# Patient Record
Sex: MALE | Race: WHITE | NOT HISPANIC OR LATINO
[De-identification: names, ages, dates, MRNs, and addresses within clinical notes are randomized per-mention and may not be internally consistent; named-entity substitution may affect disease eponyms.]

---

## 2017-01-13 ENCOUNTER — CHART COPY (OUTPATIENT)
Age: 72
End: 2017-01-13

## 2017-04-17 ENCOUNTER — APPOINTMENT (OUTPATIENT)
Dept: NEUROLOGY | Facility: CLINIC | Age: 72
End: 2017-04-17

## 2017-05-22 ENCOUNTER — APPOINTMENT (OUTPATIENT)
Dept: CT IMAGING | Facility: CLINIC | Age: 72
End: 2017-05-22

## 2017-05-24 ENCOUNTER — APPOINTMENT (OUTPATIENT)
Dept: RADIOLOGY | Facility: CLINIC | Age: 72
End: 2017-05-24

## 2017-05-24 ENCOUNTER — APPOINTMENT (OUTPATIENT)
Dept: CT IMAGING | Facility: CLINIC | Age: 72
End: 2017-05-24

## 2017-05-24 ENCOUNTER — OUTPATIENT (OUTPATIENT)
Dept: OUTPATIENT SERVICES | Facility: HOSPITAL | Age: 72
LOS: 1 days | End: 2017-05-24
Payer: MEDICARE

## 2017-05-24 DIAGNOSIS — Z00.8 ENCOUNTER FOR OTHER GENERAL EXAMINATION: ICD-10-CM

## 2017-05-24 PROCEDURE — 77002 NEEDLE LOCALIZATION BY XRAY: CPT

## 2017-05-24 PROCEDURE — 73201 CT UPPER EXTREMITY W/DYE: CPT

## 2017-05-24 PROCEDURE — 23350 INJECTION FOR SHOULDER X-RAY: CPT

## 2017-06-13 ENCOUNTER — OUTPATIENT (OUTPATIENT)
Dept: OUTPATIENT SERVICES | Facility: HOSPITAL | Age: 72
LOS: 1 days | End: 2017-06-13
Payer: MEDICARE

## 2017-06-13 VITALS
OXYGEN SATURATION: 100 % | TEMPERATURE: 98 F | WEIGHT: 184.97 LBS | SYSTOLIC BLOOD PRESSURE: 120 MMHG | DIASTOLIC BLOOD PRESSURE: 64 MMHG | HEART RATE: 70 BPM | RESPIRATION RATE: 16 BRPM | HEIGHT: 73 IN

## 2017-06-13 DIAGNOSIS — Z98.1 ARTHRODESIS STATUS: Chronic | ICD-10-CM

## 2017-06-13 DIAGNOSIS — Z95.1 PRESENCE OF AORTOCORONARY BYPASS GRAFT: Chronic | ICD-10-CM

## 2017-06-13 DIAGNOSIS — Z98.890 OTHER SPECIFIED POSTPROCEDURAL STATES: Chronic | ICD-10-CM

## 2017-06-13 DIAGNOSIS — Z01.818 ENCOUNTER FOR OTHER PREPROCEDURAL EXAMINATION: ICD-10-CM

## 2017-06-13 DIAGNOSIS — M75.121 COMPLETE ROTATOR CUFF TEAR OR RUPTURE OF RIGHT SHOULDER, NOT SPECIFIED AS TRAUMATIC: ICD-10-CM

## 2017-06-13 DIAGNOSIS — Z95.0 PRESENCE OF CARDIAC PACEMAKER: Chronic | ICD-10-CM

## 2017-06-13 DIAGNOSIS — M19.011 PRIMARY OSTEOARTHRITIS, RIGHT SHOULDER: ICD-10-CM

## 2017-06-13 DIAGNOSIS — M25.511 PAIN IN RIGHT SHOULDER: ICD-10-CM

## 2017-06-13 LAB
ANION GAP SERPL CALC-SCNC: 5 MMOL/L — SIGNIFICANT CHANGE UP (ref 5–17)
BUN SERPL-MCNC: 32 MG/DL — HIGH (ref 7–23)
CALCIUM SERPL-MCNC: 9.5 MG/DL — SIGNIFICANT CHANGE UP (ref 8.4–10.5)
CHLORIDE SERPL-SCNC: 104 MMOL/L — SIGNIFICANT CHANGE UP (ref 96–108)
CO2 SERPL-SCNC: 30 MMOL/L — SIGNIFICANT CHANGE UP (ref 22–31)
CREAT SERPL-MCNC: 1.18 MG/DL — SIGNIFICANT CHANGE UP (ref 0.5–1.3)
GLUCOSE SERPL-MCNC: 111 MG/DL — HIGH (ref 70–99)
HCT VFR BLD CALC: 47 % — SIGNIFICANT CHANGE UP (ref 39–50)
HGB BLD-MCNC: 15.4 G/DL — SIGNIFICANT CHANGE UP (ref 13–17)
MCHC RBC-ENTMCNC: 32.8 GM/DL — SIGNIFICANT CHANGE UP (ref 32–36)
MCHC RBC-ENTMCNC: 33.1 PG — SIGNIFICANT CHANGE UP (ref 27–34)
MCV RBC AUTO: 100.9 FL — HIGH (ref 80–100)
PLATELET # BLD AUTO: 155 K/UL — SIGNIFICANT CHANGE UP (ref 150–400)
POTASSIUM SERPL-MCNC: 4.2 MMOL/L — SIGNIFICANT CHANGE UP (ref 3.5–5.3)
POTASSIUM SERPL-SCNC: 4.2 MMOL/L — SIGNIFICANT CHANGE UP (ref 3.5–5.3)
RBC # BLD: 4.66 M/UL — SIGNIFICANT CHANGE UP (ref 4.2–5.8)
RBC # FLD: 12.6 % — SIGNIFICANT CHANGE UP (ref 10.3–14.5)
SODIUM SERPL-SCNC: 139 MMOL/L — SIGNIFICANT CHANGE UP (ref 135–145)
WBC # BLD: 5.6 K/UL — SIGNIFICANT CHANGE UP (ref 3.8–10.5)
WBC # FLD AUTO: 5.6 K/UL — SIGNIFICANT CHANGE UP (ref 3.8–10.5)

## 2017-06-13 PROCEDURE — 85027 COMPLETE CBC AUTOMATED: CPT

## 2017-06-13 PROCEDURE — 36415 COLL VENOUS BLD VENIPUNCTURE: CPT

## 2017-06-13 PROCEDURE — G0463: CPT

## 2017-06-13 PROCEDURE — 93005 ELECTROCARDIOGRAM TRACING: CPT

## 2017-06-13 PROCEDURE — 80048 BASIC METABOLIC PNL TOTAL CA: CPT

## 2017-06-13 PROCEDURE — 93010 ELECTROCARDIOGRAM REPORT: CPT | Mod: NC

## 2017-06-13 NOTE — H&P PST ADULT - NSANTHOSAYNRD_GEN_A_CORE
No. JAGRUTI screening performed.  STOP BANG Legend: 0-2 = LOW Risk; 3-4 = INTERMEDIATE Risk; 5-8 = HIGH Risk

## 2017-06-13 NOTE — H&P PST ADULT - HISTORY OF PRESENT ILLNESS
70 yo male presents with 5 week history right shoulder pain that began after a fall in his home. Pt was seen by surgeon, Ct was done and Rotator cuff tear was diagnosed.  Pain increases with any ROM. 72 yo male presents with 5 week history right shoulder pain that began after a fall in his home. Pt was seen by surgeon, Ct was done and Rotator cuff tear was diagnosed.  Pain increases with any ROM.  Mild relief with Mobic, which pt will stop pre op.

## 2017-06-13 NOTE — H&P PST ADULT - PSH
S/P shoulder surgery Cardiac pacemaker in situ  placed 2011  S/P CABG x 3  2011  S/P cervical spinal fusion  2000  S/P lumbar laminectomy    S/P shoulder surgery  right arthroscopy

## 2017-06-13 NOTE — H&P PST ADULT - MUSCULOSKELETAL
details… detailed exam decreased ROM due to pain/diminished strength/no calf tenderness/diminished ROM. right shoulder pain

## 2017-06-13 NOTE — H&P PST ADULT - ASSESSMENT
Pt presents to PST.  Pre op instructions reviewed and understood.  Medical, cardiac and interrogation notes/clearances to be sent.

## 2017-06-13 NOTE — H&P PST ADULT - PMH
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

## 2017-07-12 ENCOUNTER — OUTPATIENT (OUTPATIENT)
Dept: OUTPATIENT SERVICES | Facility: HOSPITAL | Age: 72
LOS: 1 days | End: 2017-07-12
Payer: MEDICARE

## 2017-07-12 VITALS
HEIGHT: 72 IN | WEIGHT: 182.98 LBS | RESPIRATION RATE: 14 BRPM | TEMPERATURE: 98 F | SYSTOLIC BLOOD PRESSURE: 130 MMHG | HEART RATE: 70 BPM | DIASTOLIC BLOOD PRESSURE: 80 MMHG

## 2017-07-12 DIAGNOSIS — Z95.0 PRESENCE OF CARDIAC PACEMAKER: Chronic | ICD-10-CM

## 2017-07-12 DIAGNOSIS — Z98.41 CATARACT EXTRACTION STATUS, RIGHT EYE: Chronic | ICD-10-CM

## 2017-07-12 DIAGNOSIS — Z98.1 ARTHRODESIS STATUS: Chronic | ICD-10-CM

## 2017-07-12 DIAGNOSIS — Z01.818 ENCOUNTER FOR OTHER PREPROCEDURAL EXAMINATION: ICD-10-CM

## 2017-07-12 DIAGNOSIS — Z95.1 PRESENCE OF AORTOCORONARY BYPASS GRAFT: Chronic | ICD-10-CM

## 2017-07-12 DIAGNOSIS — M75.121 COMPLETE ROTATOR CUFF TEAR OR RUPTURE OF RIGHT SHOULDER, NOT SPECIFIED AS TRAUMATIC: ICD-10-CM

## 2017-07-12 DIAGNOSIS — Z98.890 OTHER SPECIFIED POSTPROCEDURAL STATES: Chronic | ICD-10-CM

## 2017-07-12 DIAGNOSIS — M19.011 PRIMARY OSTEOARTHRITIS, RIGHT SHOULDER: ICD-10-CM

## 2017-07-12 LAB
ANION GAP SERPL CALC-SCNC: 6 MMOL/L — SIGNIFICANT CHANGE UP (ref 5–17)
BUN SERPL-MCNC: 32 MG/DL — HIGH (ref 7–23)
CALCIUM SERPL-MCNC: 9.7 MG/DL — SIGNIFICANT CHANGE UP (ref 8.4–10.5)
CHLORIDE SERPL-SCNC: 103 MMOL/L — SIGNIFICANT CHANGE UP (ref 96–108)
CO2 SERPL-SCNC: 31 MMOL/L — SIGNIFICANT CHANGE UP (ref 22–31)
CREAT SERPL-MCNC: 1.09 MG/DL — SIGNIFICANT CHANGE UP (ref 0.5–1.3)
GLUCOSE SERPL-MCNC: 77 MG/DL — SIGNIFICANT CHANGE UP (ref 70–99)
HCT VFR BLD CALC: 45.8 % — SIGNIFICANT CHANGE UP (ref 39–50)
HGB BLD-MCNC: 15.5 G/DL — SIGNIFICANT CHANGE UP (ref 13–17)
MCHC RBC-ENTMCNC: 34 GM/DL — SIGNIFICANT CHANGE UP (ref 32–36)
MCHC RBC-ENTMCNC: 34.6 PG — HIGH (ref 27–34)
MCV RBC AUTO: 102 FL — HIGH (ref 80–100)
PLATELET # BLD AUTO: 134 K/UL — LOW (ref 150–400)
POTASSIUM SERPL-MCNC: 4.6 MMOL/L — SIGNIFICANT CHANGE UP (ref 3.5–5.3)
POTASSIUM SERPL-SCNC: 4.6 MMOL/L — SIGNIFICANT CHANGE UP (ref 3.5–5.3)
RBC # BLD: 4.49 M/UL — SIGNIFICANT CHANGE UP (ref 4.2–5.8)
RBC # FLD: 12.8 % — SIGNIFICANT CHANGE UP (ref 10.3–14.5)
SODIUM SERPL-SCNC: 140 MMOL/L — SIGNIFICANT CHANGE UP (ref 135–145)
WBC # BLD: 4.5 K/UL — SIGNIFICANT CHANGE UP (ref 3.8–10.5)
WBC # FLD AUTO: 4.5 K/UL — SIGNIFICANT CHANGE UP (ref 3.8–10.5)

## 2017-07-12 PROCEDURE — 80048 BASIC METABOLIC PNL TOTAL CA: CPT

## 2017-07-12 PROCEDURE — 85027 COMPLETE CBC AUTOMATED: CPT

## 2017-07-12 NOTE — H&P PST ADULT - PROBLEM SELECTOR PLAN 1
EUA, ARTHROSCOPIC RIGHT SHOULDER POSSIBLE RCR , ARTHROSCOPIC RIGHT SHOULDER POSSIBLE RCR  Medical and cardiac clearance   PPM interrogation Report   Follow up with cardiologist on Aspirin medication instruction for upcoming surgery.

## 2017-07-12 NOTE — H&P PST ADULT - ASSESSMENT
Pt presents to PST.  Pre op instructions reviewed and understood.  Medical, cardiac and interrogation notes/clearances to be sent. 72 y/o male with right shoulder rotator cuff tear , scheduled for right shoulder arthroscopy

## 2017-07-12 NOTE — H&P PST ADULT - FAMILY HISTORY
Mother  Still living? No  Family history of diabetes mellitus, Age at diagnosis: Age Unknown  Family history of liver cancer, Age at diagnosis: Age Unknown

## 2017-07-12 NOTE — H&P PST ADULT - MUSCULOSKELETAL COMMENTS
right sided paresis due to cervical myelopathy right sided paresis due to cervical myelopathy, right shoulder tender on palpation

## 2017-07-12 NOTE — H&P PST ADULT - PMH
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

## 2017-07-12 NOTE — H&P PST ADULT - RS GEN PE MLT RESP DETAILS PC
good air movement/breath sounds equal/airway patent airway patent/good air movement/breath sounds equal/no rhonchi/no rales

## 2017-07-12 NOTE — H&P PST ADULT - PAIN LOCATION, PROFILE
foot/shoulder/upper/lower/leg/ankle/arm/knee leg/upper/back pain, neck/arm/foot/knee/ankle/lower/shoulder

## 2017-07-12 NOTE — H&P PST ADULT - MUSCULOSKELETAL
details… detailed exam diminished strength/diminished ROM. right shoulder pain/decreased ROM due to pain/no calf tenderness

## 2017-07-12 NOTE — H&P PST ADULT - HISTORY OF PRESENT ILLNESS
72 yo male presents with 5 week history right shoulder pain that began after a fall in his home. Pt was seen by surgeon, Ct was done and Rotator cuff tear was diagnosed.  Pain increases with any ROM.  Mild relief with Mobic, which pt will stop pre op. This is a 72 y/o male who presents with complaint of  right shoulder pain and limited ROM s/p fall at home 2 months ago. Ct was done and Rotator cuff tear was diagnosed.  Pain increases with any ROM and  activities   Mild relief with Mobic. surgery was cancelled before due to pending cardiac cath . Now rescheduled for right shoulder  arthroscopy This is a 72 y/o male pmhx of Afib, ASHD,CAD, CABGx3, HTN, HLD, PPM,  who presents with complaint of  right shoulder pain and limited ROM s/p fall at home 2 months ago. Ct was done and Rotator cuff tear was diagnosed.  Pain increases with any ROM and  activities   Mild relief with Mobic. surgery was cancelled before due to pending cardiac cath . Now rescheduled for right shoulder  arthroscopy

## 2017-07-12 NOTE — H&P PST ADULT - PSH
Cardiac pacemaker in situ  placed 2011  S/P CABG x 3  2011  S/P cataract surgery, right  2015  S/P cervical spinal fusion  2000  S/P lumbar laminectomy    S/P shoulder surgery  right arthroscopy

## 2017-07-12 NOTE — H&P PST ADULT - CARDIOVASCULAR COMMENTS
paced CAD, s/p CABGx3 pacemaker in situ CAD, s/p CABGx3,  H/o AFIB ; not on AC sec to  fall risk ; scheduled for Watchman procedure CAD, s/p CABGx3,  H/o AFIB ; not on AC sec to  fall risk (Cervical myelopathy); scheduled for Watchman procedure

## 2017-07-17 NOTE — ASU DISCHARGE PLAN (ADULT/PEDIATRIC). - DRESSING FT
clean with peroxide, shower and apply bandaids clean with peroxide, shower and apply band-aids over sutures

## 2017-07-17 NOTE — ASU DISCHARGE PLAN (ADULT/PEDIATRIC). - MEDICATION SUMMARY - MEDICATIONS TO STOP TAKING
I will STOP taking the medications listed below when I get home from the hospital:    Percocet 5/325 oral tablet  -- 1 tab(s) by mouth prn

## 2017-07-17 NOTE — ASU DISCHARGE PLAN (ADULT/PEDIATRIC). - NOTIFY
Persistent Nausea and Vomiting/Pain not relieved by Medications/Bleeding that does not stop/Excessive Diarrhea/Inability to Tolerate Liquids or Foods/Increased Irritability or Sluggishness/Swelling that continues/Numbness, color, or temperature change to extremity/Fever greater than 101

## 2017-07-17 NOTE — ASU DISCHARGE PLAN (ADULT/PEDIATRIC). - SPECIAL INSTRUCTIONS
apply ice to operative site 20 minutes on and 20 minutes off for next 48 hours  Pain meds as per MD  Take an over the counter stool softener like Colace to avoid constipation while taking a narcotic for pain Apply ice to operative site 20 minutes on and 20 minutes off for next 48 hours. Sling at all times, except for hygiene and to extend and bend your elbow.  Pain meds as per MD  Take an over the counter stool softener like Colace to avoid constipation while taking a narcotic for pain.    FOLLOW enclosed shoulder arthroscopy brochure.

## 2017-07-17 NOTE — ASU DISCHARGE PLAN (ADULT/PEDIATRIC). - FOLLOWUP APPOINTMENT CLINIC/PHYSICIAN
Call Dr. Rowell' office [phone] to make an appointment to be seen within 7-10 days Call Dr. Gibbs'd office [phone] to make an appointment to be seen within 7-10 days Call Dr. Gibbs's office [phone] to make an appointment to be seen within 7-10 days

## 2017-07-17 NOTE — ASU DISCHARGE PLAN (ADULT/PEDIATRIC). - ACTIVITY LEVEL
no heavy lifting/elevate extremity/no tub baths/no sports/gym/sling no heavy lifting/sling for support and comfort/elevate extremity/no tub baths/no sports/gym

## 2017-07-17 NOTE — ASU DISCHARGE PLAN (ADULT/PEDIATRIC). - BATHING
cover dresssing with waterproof sleeve/shower only shower only/cover dressing with waterproof sleeve

## 2017-07-17 NOTE — ASU DISCHARGE PLAN (ADULT/PEDIATRIC). - MEDICATION SUMMARY - MEDICATIONS TO TAKE
I will START or STAY ON the medications listed below when I get home from the hospital:    meloxicam 15 mg oral tablet  -- 1 tab(s) by mouth prn  -- will stop 1 week pre op  -- Indication: For mild pain    aspirin 81 mg oral tablet  -- 1 tab(s) by mouth once a day  -- Indication: For heart health    Percocet 5/325 oral tablet  -- 1 tab(s) by mouth every 4 hours, As Needed -for moderate pain MDD:6  -- Indication: For moderate to severe pain    Rapaflo 8 mg oral capsule  -- 1 cap(s) by mouth once a day  -- Indication: For enlarged prostate    gabapentin 300 mg oral capsule  -- 1 cap(s) by mouth 3 times a day  -- Indication: For nerve pain    Lipitor 10 mg oral tablet  -- 1 tab(s) by mouth once a day  -- Indication: For high cholesterol    Xanax 0.5 mg oral tablet  -- 1 tab(s) by mouth prn  -- Indication: For anxiety    metoprolol tartrate 25 mg oral tablet  -- 1 tab(s) by mouth once a day  -- Indication: For high blood pressure    hyoscyamine 0.15 mg oral tablet  --  by mouth , As Needed  -- Indication: For bowels    Pepcid 20 mg oral tablet  -- 1 tab(s) by mouth 2 times a day  -- Indication: For reflux    Fish Oil oral capsule  --  by mouth   -- will stop 1 week pre op  -- Indication: For supplement    Dexilant 60 mg oral delayed release capsule  -- 1 cap(s) by mouth prn  -- Indication: For reflux

## 2017-07-31 NOTE — ASU PATIENT PROFILE, ADULT - PMH
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

## 2017-08-01 ENCOUNTER — TRANSCRIPTION ENCOUNTER (OUTPATIENT)
Age: 72
End: 2017-08-01

## 2017-08-01 ENCOUNTER — RESULT REVIEW (OUTPATIENT)
Age: 72
End: 2017-08-01

## 2017-08-01 ENCOUNTER — OUTPATIENT (OUTPATIENT)
Dept: OUTPATIENT SERVICES | Facility: HOSPITAL | Age: 72
LOS: 1 days | End: 2017-08-01
Payer: MEDICARE

## 2017-08-01 VITALS
RESPIRATION RATE: 18 BRPM | OXYGEN SATURATION: 99 % | HEART RATE: 70 BPM | DIASTOLIC BLOOD PRESSURE: 72 MMHG | SYSTOLIC BLOOD PRESSURE: 130 MMHG

## 2017-08-01 VITALS
DIASTOLIC BLOOD PRESSURE: 74 MMHG | WEIGHT: 178.13 LBS | SYSTOLIC BLOOD PRESSURE: 139 MMHG | RESPIRATION RATE: 10 BRPM | TEMPERATURE: 98 F | HEIGHT: 72 IN | HEART RATE: 70 BPM | OXYGEN SATURATION: 98 %

## 2017-08-01 DIAGNOSIS — Z95.0 PRESENCE OF CARDIAC PACEMAKER: Chronic | ICD-10-CM

## 2017-08-01 DIAGNOSIS — Z98.890 OTHER SPECIFIED POSTPROCEDURAL STATES: Chronic | ICD-10-CM

## 2017-08-01 DIAGNOSIS — Z98.41 CATARACT EXTRACTION STATUS, RIGHT EYE: Chronic | ICD-10-CM

## 2017-08-01 DIAGNOSIS — M75.121 COMPLETE ROTATOR CUFF TEAR OR RUPTURE OF RIGHT SHOULDER, NOT SPECIFIED AS TRAUMATIC: ICD-10-CM

## 2017-08-01 DIAGNOSIS — M19.011 PRIMARY OSTEOARTHRITIS, RIGHT SHOULDER: ICD-10-CM

## 2017-08-01 DIAGNOSIS — Z98.1 ARTHRODESIS STATUS: Chronic | ICD-10-CM

## 2017-08-01 DIAGNOSIS — Z95.1 PRESENCE OF AORTOCORONARY BYPASS GRAFT: Chronic | ICD-10-CM

## 2017-08-01 PROCEDURE — 29827 SHO ARTHRS SRG RT8TR CUF RPR: CPT | Mod: RT

## 2017-08-01 PROCEDURE — 88304 TISSUE EXAM BY PATHOLOGIST: CPT | Mod: 26

## 2017-08-01 PROCEDURE — 29826 SHO ARTHRS SRG DECOMPRESSION: CPT | Mod: RT

## 2017-08-01 PROCEDURE — C1713: CPT

## 2017-08-01 PROCEDURE — 88304 TISSUE EXAM BY PATHOLOGIST: CPT

## 2017-08-01 RX ORDER — SODIUM CHLORIDE 9 MG/ML
1000 INJECTION, SOLUTION INTRAVENOUS
Qty: 0 | Refills: 0 | Status: DISCONTINUED | OUTPATIENT
Start: 2017-08-01 | End: 2017-08-01

## 2017-08-01 RX ORDER — OXYCODONE AND ACETAMINOPHEN 5; 325 MG/1; MG/1
1 TABLET ORAL EVERY 4 HOURS
Qty: 0 | Refills: 0 | Status: DISCONTINUED | OUTPATIENT
Start: 2017-08-01 | End: 2017-08-01

## 2017-08-01 RX ORDER — HYDROMORPHONE HYDROCHLORIDE 2 MG/ML
0.5 INJECTION INTRAMUSCULAR; INTRAVENOUS; SUBCUTANEOUS
Qty: 0 | Refills: 0 | Status: DISCONTINUED | OUTPATIENT
Start: 2017-08-01 | End: 2017-08-01

## 2017-08-01 RX ORDER — ONDANSETRON 8 MG/1
4 TABLET, FILM COATED ORAL ONCE
Qty: 0 | Refills: 0 | Status: DISCONTINUED | OUTPATIENT
Start: 2017-08-01 | End: 2017-08-01

## 2017-08-01 RX ORDER — CEFAZOLIN SODIUM 1 G
2000 VIAL (EA) INJECTION ONCE
Qty: 0 | Refills: 0 | Status: COMPLETED | OUTPATIENT
Start: 2017-08-01 | End: 2017-08-01

## 2017-08-01 RX ADMIN — SODIUM CHLORIDE 75 MILLILITER(S): 9 INJECTION, SOLUTION INTRAVENOUS at 12:20

## 2017-08-01 NOTE — BRIEF OPERATIVE NOTE - PROCEDURE
Repair of rotator cuff  08/01/2017  with debridement gleno-humeral joint and decompression  Active  Spring Morales  Arthroscopy of shoulder  08/01/2017  RIGHT  Active  Spring Morales

## 2017-10-02 ENCOUNTER — RX RENEWAL (OUTPATIENT)
Age: 72
End: 2017-10-02

## 2017-12-04 ENCOUNTER — APPOINTMENT (OUTPATIENT)
Dept: ORTHOPEDIC SURGERY | Facility: CLINIC | Age: 72
End: 2017-12-04
Payer: MEDICARE

## 2017-12-04 VITALS
HEIGHT: 73 IN | BODY MASS INDEX: 25.31 KG/M2 | HEART RATE: 89 BPM | SYSTOLIC BLOOD PRESSURE: 131 MMHG | WEIGHT: 191 LBS | DIASTOLIC BLOOD PRESSURE: 80 MMHG

## 2017-12-04 PROCEDURE — 99214 OFFICE O/P EST MOD 30 MIN: CPT

## 2018-01-03 ENCOUNTER — CHART COPY (OUTPATIENT)
Age: 73
End: 2018-01-03

## 2018-04-16 ENCOUNTER — FORM ENCOUNTER (OUTPATIENT)
Age: 73
End: 2018-04-16

## 2018-04-17 ENCOUNTER — APPOINTMENT (OUTPATIENT)
Dept: CT IMAGING | Facility: CLINIC | Age: 73
End: 2018-04-17
Payer: MEDICARE

## 2018-04-17 ENCOUNTER — OUTPATIENT (OUTPATIENT)
Dept: OUTPATIENT SERVICES | Facility: HOSPITAL | Age: 73
LOS: 1 days | End: 2018-04-17
Payer: MEDICARE

## 2018-04-17 ENCOUNTER — APPOINTMENT (OUTPATIENT)
Dept: NEUROLOGY | Facility: CLINIC | Age: 73
End: 2018-04-17
Payer: MEDICARE

## 2018-04-17 VITALS
BODY MASS INDEX: 24.52 KG/M2 | WEIGHT: 185 LBS | HEART RATE: 70 BPM | SYSTOLIC BLOOD PRESSURE: 116 MMHG | HEIGHT: 73 IN | DIASTOLIC BLOOD PRESSURE: 63 MMHG

## 2018-04-17 DIAGNOSIS — Z98.1 ARTHRODESIS STATUS: Chronic | ICD-10-CM

## 2018-04-17 DIAGNOSIS — Z00.8 ENCOUNTER FOR OTHER GENERAL EXAMINATION: ICD-10-CM

## 2018-04-17 DIAGNOSIS — Z98.890 OTHER SPECIFIED POSTPROCEDURAL STATES: Chronic | ICD-10-CM

## 2018-04-17 DIAGNOSIS — Z95.1 PRESENCE OF AORTOCORONARY BYPASS GRAFT: Chronic | ICD-10-CM

## 2018-04-17 DIAGNOSIS — Z95.0 PRESENCE OF CARDIAC PACEMAKER: Chronic | ICD-10-CM

## 2018-04-17 DIAGNOSIS — Z98.41 CATARACT EXTRACTION STATUS, RIGHT EYE: Chronic | ICD-10-CM

## 2018-04-17 PROCEDURE — 99214 OFFICE O/P EST MOD 30 MIN: CPT

## 2018-04-17 PROCEDURE — 76376 3D RENDER W/INTRP POSTPROCES: CPT

## 2018-04-17 PROCEDURE — 76376 3D RENDER W/INTRP POSTPROCES: CPT | Mod: 26

## 2018-04-17 PROCEDURE — 72131 CT LUMBAR SPINE W/O DYE: CPT

## 2018-04-17 PROCEDURE — 72131 CT LUMBAR SPINE W/O DYE: CPT | Mod: 26

## 2018-05-14 ENCOUNTER — APPOINTMENT (OUTPATIENT)
Dept: ORTHOPEDIC SURGERY | Facility: CLINIC | Age: 73
End: 2018-05-14
Payer: MEDICARE

## 2018-05-14 VITALS
DIASTOLIC BLOOD PRESSURE: 62 MMHG | WEIGHT: 185 LBS | BODY MASS INDEX: 24.52 KG/M2 | HEIGHT: 73 IN | HEART RATE: 70 BPM | SYSTOLIC BLOOD PRESSURE: 99 MMHG

## 2018-05-14 VITALS — WEIGHT: 185 LBS | HEIGHT: 73 IN | BODY MASS INDEX: 24.52 KG/M2

## 2018-05-14 PROCEDURE — 99214 OFFICE O/P EST MOD 30 MIN: CPT

## 2018-06-01 ENCOUNTER — RECORD ABSTRACTING (OUTPATIENT)
Age: 73
End: 2018-06-01

## 2018-06-01 DIAGNOSIS — S80.00XA CONTUSION OF UNSPECIFIED KNEE, INITIAL ENCOUNTER: ICD-10-CM

## 2018-06-01 DIAGNOSIS — M75.42 IMPINGEMENT SYNDROME OF LEFT SHOULDER: ICD-10-CM

## 2018-06-01 DIAGNOSIS — Z09 ENCOUNTER FOR FOLLOW-UP EXAMINATION AFTER COMPLETED TREATMENT FOR CONDITIONS OTHER THAN MALIGNANT NEOPLASM: ICD-10-CM

## 2018-06-01 DIAGNOSIS — E78.00 PURE HYPERCHOLESTEROLEMIA, UNSPECIFIED: ICD-10-CM

## 2018-06-01 DIAGNOSIS — M70.20 OLECRANON BURSITIS, UNSPECIFIED ELBOW: ICD-10-CM

## 2018-06-01 DIAGNOSIS — K21.9 GASTRO-ESOPHAGEAL REFLUX DISEASE W/OUT ESOPHAGITIS: ICD-10-CM

## 2018-06-01 DIAGNOSIS — M70.40 PREPATELLAR BURSITIS, UNSPECIFIED KNEE: ICD-10-CM

## 2018-06-01 DIAGNOSIS — Z82.61 FAMILY HISTORY OF ARTHRITIS: ICD-10-CM

## 2018-06-06 ENCOUNTER — APPOINTMENT (OUTPATIENT)
Dept: SPINE | Facility: CLINIC | Age: 73
End: 2018-06-06
Payer: MEDICARE

## 2018-06-06 VITALS
WEIGHT: 185 LBS | HEIGHT: 72 IN | DIASTOLIC BLOOD PRESSURE: 63 MMHG | BODY MASS INDEX: 25.06 KG/M2 | HEART RATE: 73 BPM | SYSTOLIC BLOOD PRESSURE: 122 MMHG

## 2018-06-06 PROCEDURE — 99204 OFFICE O/P NEW MOD 45 MIN: CPT

## 2018-06-06 RX ORDER — GABAPENTIN 300 MG/1
300 CAPSULE ORAL
Refills: 0 | Status: COMPLETED | COMMUNITY
End: 2018-06-06

## 2018-06-06 RX ORDER — METHYLPREDNISOLONE 4 MG/1
4 TABLET ORAL
Qty: 1 | Refills: 0 | Status: COMPLETED | COMMUNITY
Start: 2017-01-13 | End: 2018-06-06

## 2018-06-06 RX ORDER — METHYLPREDNISOLONE 4 MG/1
4 TABLET ORAL
Qty: 1 | Refills: 0 | Status: COMPLETED | COMMUNITY
Start: 2018-01-03 | End: 2018-06-06

## 2018-06-14 ENCOUNTER — APPOINTMENT (OUTPATIENT)
Dept: NEUROLOGY | Facility: CLINIC | Age: 73
End: 2018-06-14
Payer: MEDICARE

## 2018-06-14 PROCEDURE — 95886 MUSC TEST DONE W/N TEST COMP: CPT

## 2018-06-14 PROCEDURE — 95909 NRV CNDJ TST 5-6 STUDIES: CPT

## 2018-06-20 ENCOUNTER — APPOINTMENT (OUTPATIENT)
Dept: RADIOLOGY | Facility: HOSPITAL | Age: 73
End: 2018-06-20
Payer: MEDICARE

## 2018-06-20 ENCOUNTER — OUTPATIENT (OUTPATIENT)
Dept: OUTPATIENT SERVICES | Facility: HOSPITAL | Age: 73
LOS: 1 days | End: 2018-06-20
Payer: MEDICARE

## 2018-06-20 DIAGNOSIS — Z98.41 CATARACT EXTRACTION STATUS, RIGHT EYE: Chronic | ICD-10-CM

## 2018-06-20 DIAGNOSIS — M51.36 OTHER INTERVERTEBRAL DISC DEGENERATION, LUMBAR REGION: ICD-10-CM

## 2018-06-20 DIAGNOSIS — Z98.1 ARTHRODESIS STATUS: Chronic | ICD-10-CM

## 2018-06-20 DIAGNOSIS — Z98.890 OTHER SPECIFIED POSTPROCEDURAL STATES: Chronic | ICD-10-CM

## 2018-06-20 DIAGNOSIS — M43.17 SPONDYLOLISTHESIS, LUMBOSACRAL REGION: ICD-10-CM

## 2018-06-20 DIAGNOSIS — Z00.00 ENCOUNTER FOR GENERAL ADULT MEDICAL EXAMINATION WITHOUT ABNORMAL FINDINGS: ICD-10-CM

## 2018-06-20 DIAGNOSIS — G95.9 DISEASE OF SPINAL CORD, UNSPECIFIED: ICD-10-CM

## 2018-06-20 DIAGNOSIS — Z95.0 PRESENCE OF CARDIAC PACEMAKER: Chronic | ICD-10-CM

## 2018-06-20 DIAGNOSIS — Z95.1 PRESENCE OF AORTOCORONARY BYPASS GRAFT: Chronic | ICD-10-CM

## 2018-06-20 PROCEDURE — 62305 MYELOGRAPHY LUMBAR INJECTION: CPT

## 2018-06-20 PROCEDURE — 72132 CT LUMBAR SPINE W/DYE: CPT | Mod: 26

## 2018-06-20 PROCEDURE — 72126 CT NECK SPINE W/DYE: CPT | Mod: 26

## 2018-06-20 PROCEDURE — 72126 CT NECK SPINE W/DYE: CPT

## 2018-06-20 PROCEDURE — 72129 CT CHEST SPINE W/DYE: CPT

## 2018-06-20 PROCEDURE — 72132 CT LUMBAR SPINE W/DYE: CPT

## 2018-06-20 PROCEDURE — 72129 CT CHEST SPINE W/DYE: CPT | Mod: 26

## 2018-07-02 ENCOUNTER — APPOINTMENT (OUTPATIENT)
Dept: ORTHOPEDIC SURGERY | Facility: CLINIC | Age: 73
End: 2018-07-02
Payer: MEDICARE

## 2018-07-02 VITALS
HEART RATE: 60 BPM | SYSTOLIC BLOOD PRESSURE: 134 MMHG | HEIGHT: 72 IN | DIASTOLIC BLOOD PRESSURE: 71 MMHG | BODY MASS INDEX: 25.06 KG/M2 | WEIGHT: 185 LBS

## 2018-07-02 PROCEDURE — 99214 OFFICE O/P EST MOD 30 MIN: CPT

## 2018-07-02 RX ORDER — AMOXICILLIN AND CLAVULANATE POTASSIUM 875; 125 MG/1; MG/1
875-125 TABLET, COATED ORAL
Qty: 20 | Refills: 0 | Status: DISCONTINUED | COMMUNITY
Start: 2018-06-04

## 2018-07-02 RX ORDER — ALPRAZOLAM 0.5 MG/1
0.5 TABLET ORAL
Qty: 90 | Refills: 0 | Status: DISCONTINUED | COMMUNITY
Start: 2018-04-16

## 2018-07-11 ENCOUNTER — APPOINTMENT (OUTPATIENT)
Dept: SPINE | Facility: CLINIC | Age: 73
End: 2018-07-11
Payer: MEDICARE

## 2018-07-11 VITALS
HEART RATE: 65 BPM | BODY MASS INDEX: 25.06 KG/M2 | SYSTOLIC BLOOD PRESSURE: 124 MMHG | DIASTOLIC BLOOD PRESSURE: 64 MMHG | HEIGHT: 72 IN | WEIGHT: 185 LBS

## 2018-07-11 PROCEDURE — 99214 OFFICE O/P EST MOD 30 MIN: CPT

## 2018-07-16 PROBLEM — G83.9 PARALYTIC SYNDROME, UNSPECIFIED: Chronic | Status: INACTIVE | Noted: 2017-06-13 | Resolved: 2017-07-12

## 2018-07-16 PROBLEM — R00.0 TACHYCARDIA, UNSPECIFIED: Chronic | Status: INACTIVE | Noted: 2017-06-13 | Resolved: 2017-07-12

## 2018-07-16 PROBLEM — G83.9 PARALYTIC SYNDROME, UNSPECIFIED: Chronic | Status: ACTIVE | Noted: 2017-07-12

## 2018-07-19 ENCOUNTER — APPOINTMENT (OUTPATIENT)
Dept: ORTHOPEDIC SURGERY | Facility: CLINIC | Age: 73
End: 2018-07-19
Payer: MEDICARE

## 2018-07-19 VITALS — HEIGHT: 72 IN | WEIGHT: 185 LBS | BODY MASS INDEX: 25.06 KG/M2

## 2018-07-19 DIAGNOSIS — M19.019 PRIMARY OSTEOARTHRITIS, UNSPECIFIED SHOULDER: ICD-10-CM

## 2018-07-19 DIAGNOSIS — M75.101 UNSPECIFIED ROTATOR CUFF TEAR OR RUPTURE OF RIGHT SHOULDER, NOT SPECIFIED AS TRAUMATIC: ICD-10-CM

## 2018-07-19 DIAGNOSIS — M19.029 PRIMARY OSTEOARTHRITIS, UNSPECIFIED ELBOW: ICD-10-CM

## 2018-07-19 PROCEDURE — 73080 X-RAY EXAM OF ELBOW: CPT | Mod: LT

## 2018-07-19 PROCEDURE — 73030 X-RAY EXAM OF SHOULDER: CPT | Mod: RT

## 2018-07-19 PROCEDURE — 99214 OFFICE O/P EST MOD 30 MIN: CPT

## 2018-07-26 PROBLEM — M19.90 UNSPECIFIED OSTEOARTHRITIS, UNSPECIFIED SITE: Chronic | Status: ACTIVE | Noted: 2017-06-13

## 2018-07-26 PROBLEM — M25.511 PAIN IN RIGHT SHOULDER: Chronic | Status: ACTIVE | Noted: 2017-06-13

## 2018-07-26 PROBLEM — N40.0 BENIGN PROSTATIC HYPERPLASIA WITHOUT LOWER URINARY TRACT SYMPTOMS: Chronic | Status: ACTIVE | Noted: 2017-07-12

## 2018-07-26 PROBLEM — I25.10 ATHEROSCLEROTIC HEART DISEASE OF NATIVE CORONARY ARTERY WITHOUT ANGINA PECTORIS: Chronic | Status: ACTIVE | Noted: 2017-06-13

## 2018-07-26 PROBLEM — M47.12 OTHER SPONDYLOSIS WITH MYELOPATHY, CERVICAL REGION: Chronic | Status: ACTIVE | Noted: 2017-06-13

## 2018-07-26 PROBLEM — K21.9 GASTRO-ESOPHAGEAL REFLUX DISEASE WITHOUT ESOPHAGITIS: Chronic | Status: ACTIVE | Noted: 2017-06-13

## 2018-07-26 PROBLEM — M48.06 SPINAL STENOSIS, LUMBAR REGION: Chronic | Status: ACTIVE | Noted: 2017-06-13

## 2018-07-26 PROBLEM — Z95.1 PRESENCE OF AORTOCORONARY BYPASS GRAFT: Chronic | Status: ACTIVE | Noted: 2017-06-13

## 2018-07-26 PROBLEM — I47.1 SUPRAVENTRICULAR TACHYCARDIA: Chronic | Status: ACTIVE | Noted: 2017-07-12

## 2018-07-26 PROBLEM — R26.89 OTHER ABNORMALITIES OF GAIT AND MOBILITY: Chronic | Status: ACTIVE | Noted: 2017-06-13

## 2018-07-26 PROBLEM — I48.91 UNSPECIFIED ATRIAL FIBRILLATION: Chronic | Status: ACTIVE | Noted: 2017-07-12

## 2018-07-26 PROBLEM — M41.9 SCOLIOSIS, UNSPECIFIED: Chronic | Status: ACTIVE | Noted: 2017-06-13

## 2018-07-26 PROBLEM — D36.9 BENIGN NEOPLASM, UNSPECIFIED SITE: Chronic | Status: ACTIVE | Noted: 2017-06-13

## 2018-08-07 ENCOUNTER — INPATIENT (INPATIENT)
Facility: HOSPITAL | Age: 73
LOS: 1 days | Discharge: HOME | DRG: 62 | End: 2018-08-09
Attending: PSYCHIATRY & NEUROLOGY | Admitting: PSYCHIATRY & NEUROLOGY
Payer: MEDICARE

## 2018-08-07 VITALS
HEIGHT: 72 IN | DIASTOLIC BLOOD PRESSURE: 62 MMHG | RESPIRATION RATE: 19 BRPM | OXYGEN SATURATION: 97 % | SYSTOLIC BLOOD PRESSURE: 131 MMHG | WEIGHT: 184.97 LBS | HEART RATE: 62 BPM

## 2018-08-07 DIAGNOSIS — Z98.41 CATARACT EXTRACTION STATUS, RIGHT EYE: Chronic | ICD-10-CM

## 2018-08-07 DIAGNOSIS — Z95.1 PRESENCE OF AORTOCORONARY BYPASS GRAFT: Chronic | ICD-10-CM

## 2018-08-07 DIAGNOSIS — Z95.0 PRESENCE OF CARDIAC PACEMAKER: Chronic | ICD-10-CM

## 2018-08-07 DIAGNOSIS — I63.9 CEREBRAL INFARCTION, UNSPECIFIED: ICD-10-CM

## 2018-08-07 DIAGNOSIS — Z98.890 OTHER SPECIFIED POSTPROCEDURAL STATES: Chronic | ICD-10-CM

## 2018-08-07 DIAGNOSIS — Z98.1 ARTHRODESIS STATUS: Chronic | ICD-10-CM

## 2018-08-07 LAB
ALBUMIN SERPL ELPH-MCNC: 4.7 G/DL — SIGNIFICANT CHANGE UP (ref 3.3–5)
ALP SERPL-CCNC: 106 U/L — SIGNIFICANT CHANGE UP (ref 40–120)
ALT FLD-CCNC: 29 U/L — SIGNIFICANT CHANGE UP (ref 10–45)
ANION GAP SERPL CALC-SCNC: 11 MMOL/L — SIGNIFICANT CHANGE UP (ref 5–17)
APTT BLD: 30.3 SEC — SIGNIFICANT CHANGE UP (ref 27.5–37.4)
AST SERPL-CCNC: 30 U/L — SIGNIFICANT CHANGE UP (ref 10–40)
BASOPHILS # BLD AUTO: 0 K/UL — SIGNIFICANT CHANGE UP (ref 0–0.2)
BASOPHILS NFR BLD AUTO: 0.5 % — SIGNIFICANT CHANGE UP (ref 0–2)
BILIRUB SERPL-MCNC: 1.1 MG/DL — SIGNIFICANT CHANGE UP (ref 0.2–1.2)
BUN SERPL-MCNC: 34 MG/DL — HIGH (ref 7–23)
CALCIUM SERPL-MCNC: 9.7 MG/DL — SIGNIFICANT CHANGE UP (ref 8.4–10.5)
CHLORIDE SERPL-SCNC: 97 MMOL/L — SIGNIFICANT CHANGE UP (ref 96–108)
CHOLEST SERPL-MCNC: 134 MG/DL — SIGNIFICANT CHANGE UP (ref 10–199)
CO2 SERPL-SCNC: 28 MMOL/L — SIGNIFICANT CHANGE UP (ref 22–31)
CREAT SERPL-MCNC: 1.43 MG/DL — HIGH (ref 0.5–1.3)
EOSINOPHIL # BLD AUTO: 0.1 K/UL — SIGNIFICANT CHANGE UP (ref 0–0.5)
EOSINOPHIL NFR BLD AUTO: 2.6 % — SIGNIFICANT CHANGE UP (ref 0–6)
GLUCOSE SERPL-MCNC: 112 MG/DL — HIGH (ref 70–99)
HCT VFR BLD CALC: 46.4 % — SIGNIFICANT CHANGE UP (ref 39–50)
HDLC SERPL-MCNC: 49 MG/DL — SIGNIFICANT CHANGE UP (ref 40–125)
HGB BLD-MCNC: 15.7 G/DL — SIGNIFICANT CHANGE UP (ref 13–17)
INR BLD: 1.08 RATIO — SIGNIFICANT CHANGE UP (ref 0.88–1.16)
LIPID PNL WITH DIRECT LDL SERPL: 58 MG/DL — SIGNIFICANT CHANGE UP
LYMPHOCYTES # BLD AUTO: 0.9 K/UL — LOW (ref 1–3.3)
LYMPHOCYTES # BLD AUTO: 18.5 % — SIGNIFICANT CHANGE UP (ref 13–44)
MCHC RBC-ENTMCNC: 33.9 GM/DL — SIGNIFICANT CHANGE UP (ref 32–36)
MCHC RBC-ENTMCNC: 34.5 PG — HIGH (ref 27–34)
MCV RBC AUTO: 102 FL — HIGH (ref 80–100)
MONOCYTES # BLD AUTO: 0.4 K/UL — SIGNIFICANT CHANGE UP (ref 0–0.9)
MONOCYTES NFR BLD AUTO: 8.6 % — SIGNIFICANT CHANGE UP (ref 2–14)
NEUTROPHILS # BLD AUTO: 3.5 K/UL — SIGNIFICANT CHANGE UP (ref 1.8–7.4)
NEUTROPHILS NFR BLD AUTO: 69.8 % — SIGNIFICANT CHANGE UP (ref 43–77)
PLATELET # BLD AUTO: 146 K/UL — LOW (ref 150–400)
POTASSIUM SERPL-MCNC: 3.9 MMOL/L — SIGNIFICANT CHANGE UP (ref 3.5–5.3)
POTASSIUM SERPL-SCNC: 3.9 MMOL/L — SIGNIFICANT CHANGE UP (ref 3.5–5.3)
PROT SERPL-MCNC: 7.6 G/DL — SIGNIFICANT CHANGE UP (ref 6–8.3)
PROTHROM AB SERPL-ACNC: 11.7 SEC — SIGNIFICANT CHANGE UP (ref 9.8–12.7)
RBC # BLD: 4.56 M/UL — SIGNIFICANT CHANGE UP (ref 4.2–5.8)
RBC # FLD: 11.9 % — SIGNIFICANT CHANGE UP (ref 10.3–14.5)
SODIUM SERPL-SCNC: 136 MMOL/L — SIGNIFICANT CHANGE UP (ref 135–145)
TOTAL CHOLESTEROL/HDL RATIO MEASUREMENT: 2.7 RATIO — LOW (ref 3.4–9.6)
TRIGL SERPL-MCNC: 133 MG/DL — SIGNIFICANT CHANGE UP (ref 10–149)
TROPONIN T, HIGH SENSITIVITY RESULT: 30 NG/L — SIGNIFICANT CHANGE UP (ref 0–51)
WBC # BLD: 5 K/UL — SIGNIFICANT CHANGE UP (ref 3.8–10.5)
WBC # FLD AUTO: 5 K/UL — SIGNIFICANT CHANGE UP (ref 3.8–10.5)

## 2018-08-07 PROCEDURE — 93010 ELECTROCARDIOGRAM REPORT: CPT

## 2018-08-07 PROCEDURE — 70498 CT ANGIOGRAPHY NECK: CPT | Mod: 26

## 2018-08-07 PROCEDURE — 99291 CRITICAL CARE FIRST HOUR: CPT | Mod: GC

## 2018-08-07 PROCEDURE — 99291 CRITICAL CARE FIRST HOUR: CPT

## 2018-08-07 PROCEDURE — 70496 CT ANGIOGRAPHY HEAD: CPT | Mod: 26

## 2018-08-07 RX ORDER — ALTEPLASE 100 MG
65.5 KIT INTRAVENOUS ONCE
Qty: 0 | Refills: 0 | Status: COMPLETED | OUTPATIENT
Start: 2018-08-07 | End: 2018-08-07

## 2018-08-07 RX ORDER — DEXLANSOPRAZOLE 30 MG/1
1 CAPSULE, DELAYED RELEASE ORAL
Qty: 0 | Refills: 0 | COMMUNITY

## 2018-08-07 RX ORDER — ALTEPLASE 100 MG
7.3 KIT INTRAVENOUS ONCE
Qty: 0 | Refills: 0 | Status: COMPLETED | OUTPATIENT
Start: 2018-08-07 | End: 2018-08-07

## 2018-08-07 RX ORDER — MELOXICAM 15 MG/1
1 TABLET ORAL
Qty: 0 | Refills: 0 | COMMUNITY

## 2018-08-07 RX ORDER — METOPROLOL TARTRATE 50 MG
1 TABLET ORAL
Qty: 0 | Refills: 0 | COMMUNITY

## 2018-08-07 RX ORDER — GABAPENTIN 400 MG/1
300 CAPSULE ORAL THREE TIMES A DAY
Qty: 0 | Refills: 0 | Status: DISCONTINUED | OUTPATIENT
Start: 2018-08-07 | End: 2018-08-09

## 2018-08-07 RX ORDER — HYOSCYAMINE SULFATE 0.13 MG
0 TABLET ORAL
Qty: 0 | Refills: 0 | COMMUNITY

## 2018-08-07 RX ADMIN — ALTEPLASE 438 MILLIGRAM(S): KIT at 12:09

## 2018-08-07 RX ADMIN — ALTEPLASE 65.5 MILLIGRAM(S): KIT at 12:09

## 2018-08-07 NOTE — H&P ADULT - FAMILY HISTORY
Family history of liver cancer, colon cancer     Mother  Still living? Unknown  Family history of diabetes mellitus, Age at diagnosis: Age Unknown

## 2018-08-07 NOTE — ED PROVIDER NOTE - OBJECTIVE STATEMENT
71y/o M with h /o HTN, HLD, Afib on Plavix, presenting with weakness of the left hand, which began approximately 45 minutes ago, feels like there is weakness with .  No pain, but reports "unusual" sensation in left hand/arm.  No headache.  No fall/trauma reported.  No fever/chills. 73y/o M with h /o HTN, HLD, Afib on Plavix, presenting with weakness of the left hand, which began approximately 45 minutes ago, feels like there is weakness with .  No pain, but reports "unusual" sensation in left hand/arm.  No headache.  No fall/trauma reported.  No fever/chills.  71yo pmh cad s/p cabg, afib on asa & plavix (not on coumadin due to fall risk), htn, hld

## 2018-08-07 NOTE — H&P ADULT - NSHPPHYSICALEXAM_GEN_ALL_CORE
Vital Signs Last 24 Hrs  T(C): --  T(F): --  HR: 62 (07 Aug 2018 11:24) (62 - 62)  BP: 131/62 (07 Aug 2018 11:24) (131/62 - 131/62)  BP(mean): --  RR: 19 (07 Aug 2018 11:24) (19 - 19)  SpO2: 97% (07 Aug 2018 11:24) (97% - 97%)    PHYSICAL EXAM:   General appearance: No acute distress                 Mental Status: AAOx3, fluent speech, follows simple commands, able to name  Cranial Nerves: EOMI, PERRL, VFF, V1-V3 intact, face symmetric,  tongue midline  Motor: L hand  strength 3/5. strength 5/5 throughout otherwise in B/L UE. LE B/L 4-/5  Sensation: grossly intact to LT throughout  Coordination: FTN intact b/l, no dysmetria  Gait: chronic gait instability      NIHSS 0   MRS   1

## 2018-08-07 NOTE — H&P ADULT - HISTORY OF PRESENT ILLNESS
NEUROLOGY CONSULT     73 YO RH White M with cervical myelopathy (sees Dr Beckford), Afib on ASA/Plavix (due to fall risk, due for Watchman in Sept), presents with sudden onset L hand weakness. Pt was with his wife at [address] for her appointment, while in bathroom, he noted sudden onset weakness of L hand to point that he could barely move it. He had difficulty using his hand to perform tasks such as zipping his pants and and opening the door. Last known normal is 1045am, his symptoms are currently improving but it is not to baseline. He continues to endorse weakness. He has had similar weakness in his extremities and has chronic LE weakness/ gait instability from chronic cervical myelopathy however this was different because it was sudden in onset.   Denies HA, visual complaints, diplopia, dysarthria, dysphagia. No facial weakness or sensory changes. Denies other focal weakness that is new for him.     No contraindications to tPA, within window. Risks/benefits discussed with pt and wife at bedside in detail who express understanding. Agree with tPA administration.     NIHSS 0  MRS 1

## 2018-08-07 NOTE — ED ADULT NURSE NOTE - OBJECTIVE STATEMENT
73 y/o male a+ox3, pmhx chronic cervical neuropathy, Afib, paresis to loegs bilaterally, basal cell adenoma, CAD, balance disorder, coming from PMD office for left hand weakness x 45minutes. Pt states he was at the PMD for his wife and he went to the bathroom and noticed weakness to left hand; pt came directly to [facility] ED for eval. Code Stroke called 1124. Pt alert and cooperative; able to follow commands. CVA assessment found weakness to left hand; pt able to lift left arm and hold without drift. Pt confirms fall risk history due to cervical neuropathy; pt able to lift legs bilaterally but with marked difficulty, pt states this is normal due to known condition. IV established, labs obtained. Pt brought directly to CT for imaging. Weight obtained via standing scale 80.0kg. Neuro at bedside confirms TPA order; medications given by Neuro Melissa WINSTON as per order. Pt in CM with spo2, guard rails up, bed low and locked. Family at bedside, will reassess.

## 2018-08-07 NOTE — H&P ADULT - NSHPLABSRESULTS_GEN_ALL_CORE
LABS:                          15.7   5.0   )-----------( 146      ( 07 Aug 2018 11:41 )             46.4     08-07    136  |  97  |  34<H>  ----------------------------<  112<H>  3.9   |  28  |  1.43<H>    Ca    9.7      07 Aug 2018 11:41    TPro  7.6  /  Alb  4.7  /  TBili  1.1  /  DBili  x   /  AST  30  /  ALT  29  /  AlkPhos  106  08-07      IMAGING:

## 2018-08-07 NOTE — ED PROVIDER NOTE - MEDICAL DECISION MAKING DETAILS
acute onset LUE weakness / left hand weakness; slightly decreased  strength in left hand vs right; no pain to suggest peripheral neuropathy, no other symptoms; concern for acute stroke given onset 45 min PTA and medical history of Afib. Stroke code called and neuro at bedside.

## 2018-08-07 NOTE — ED PROVIDER NOTE - CARE PLAN
Principal Discharge DX:	CVA (cerebral vascular accident)  Secondary Diagnosis:	Weakness of extremity

## 2018-08-07 NOTE — ED ADULT NURSE NOTE - NSIMPLEMENTINTERV_GEN_ALL_ED
Implemented All Fall with Harm Risk Interventions:  [place] to call system. Call bell, personal items and telephone within reach. Instruct patient to call for assistance. Room bathroom lighting operational. Non-slip footwear when patient is off stretcher. Physically safe environment: no spills, clutter or unnecessary equipment. Stretcher in lowest position, wheels locked, appropriate side rails in place. Provide visual cue, wrist band, yellow gown, etc. Monitor gait and stability. Monitor for mental status changes and reorient to person, place, and time. Review medications for side effects contributing to fall risk. Reinforce activity limits and safety measures with patient and family. Provide visual clues: red socks.

## 2018-08-07 NOTE — ED PROVIDER NOTE - ATTENDING CONTRIBUTION TO CARE
73y/o M with h/o HTN HLD Afib on ASA81/plavix, presenting with acute onset weakness of left hand, 45 minutes PTA.  Weakness on  strength by my exam, code stroke called; neuro evaluated and CT performed; plan now, after consultation with neuro service, to administer TPA for suspected ischemic stroke and to admit to neuro service for further evaluation / management.

## 2018-08-07 NOTE — H&P ADULT - PMH
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

## 2018-08-07 NOTE — H&P ADULT - ATTENDING COMMENTS
I have seen and examined this patient with the stroke neurology team.     History was reviewed with the patient and/or available family members.   ROS: All negative except documented in the HPI.   Neurological exam was performed and agree with exam as documented above.   Laboratory results and imaging studies were reviewed by me.   I agree with the neurology resident note as documented above.    72 years old man with multiple vascular risk factors including atrial fibrillation-on therapeutic anticoagulation at the time of stroke due to "fall risk". He reports to have noticed acute onset of left hand weakness with last known well time being 10:00 AM. He presented to [facility] for evaluation. Neurological exam shows left nasolabial flattening, subtle LUE pronator drift and left hand weakness (involving median, ulnar and radial nerves distribution). CT brain on admission did not show any evidence of acute infarct or hemorrhage. CTA head and neck did not show any evidence of significant intracranial or extracranial cerebral large vessel severe stenosis or occlusion.    Impression:  Cerebral embolism with cerebral infarction. I have seen and examined this patient with the stroke neurology team.     History was reviewed with the patient and/or available family members.   ROS: All negative except documented in the HPI.   Neurological exam was performed and agree with exam as documented above.   Laboratory results and imaging studies were reviewed by me.   I agree with the neurology resident note as documented above.    72 years old man with multiple vascular risk factors including atrial fibrillation-on therapeutic anticoagulation at the time of stroke due to "fall risk". He reports to have noticed acute onset of left hand weakness with last known well time being 10:00 AM. He presented to [facility] for evaluation. Neurological exam shows left nasolabial flattening, subtle LUE pronator drift and left hand weakness (involving median, ulnar and radial nerves distribution). CT brain on admission did not show any evidence of acute infarct or hemorrhage. CTA head and neck did not show any evidence of significant intracranial or extracranial cerebral large vessel severe stenosis or occlusion.    Impression:  Cerebral embolism with cerebral infarction. Right MCA distribution stroke probably involving hand knob area presenting as "cortical hand" - likely etiology being cardioembolism in the setting of atrial fibrillation and not being on therapeutic anticoagulation    Plan:   - Risks, benefits and adverse reactions associated with IV tPA including hemorrhagic stroke were discussed with patient and available family members in detail. After ruling out all the contraindications and obtaining verbal consent, patient would be treated with IV tPA  - Continue with  24 hours post IV tPA precautions including BP goal<185/110 mmHg before the tPA bolus administration and <180/105 mmHg after tPA bolus for first 24 hours followed by gradual normotension as per protocol  - Not a candidate for neurosurgical intervention due to low NIHSS and CTA head and neck results   - Repeat CT brain at 24 hours to eval for acute infarct  - Aspirin and pharmacological DVT prophylaxis to be considered at 24 hours after the IV tPA based on repeating brain imaging, SCDs for DVT prophylaxis in the interim  - Would optimally benefit from starting therapeutic anticoagulation ([id number] score>1) preferably with DOACs like apixaban for secondary stroke prevention in the setting of atrial fibrillation despite fall risk. Could consider left atrial appendage occluder device like watchman device placement for secondary stroke prevention to avoid lifelong therapeutic anticoagulation in the setting of fall risk  - Atorvastatin 80 mg at bedtime after establishing enteral access or passing swallow evaluation; further dose titration as per LDL results  - HbA1C and LDL, continue with aggressive vascular risk factors modifications   - TTE with bubble study to evaluate for any valvular heart disease or obvious structural cardiac source of embolism  - PT/OT/Speech and swallow evaluation     Above mentioned plan was discussed with patient and available family member in detail. All the questions were answered and concerns were addressed. More than 84 minutes were spent in evaluation and management of this critically ill and unstable patient with an acute stroke.

## 2018-08-07 NOTE — H&P ADULT - NSHPREVIEWOFSYSTEMS_GEN_ALL_CORE
REVIEW OF SYSTEMS:  CONSTITUTIONAL:  No weight loss, fever, chills, weakness or fatigue.  HEENT:  Eyes:  No visual loss, blurred vision, double vision or yellow sclerae. Ears, Nose, Throat:  No hearing loss, sneezing, congestion, runny nose or sore throat.  SKIN:  No rash or itching.  CARDIOVASCULAR:  No chest pain, chest pressure or chest discomfort. No palpitations or edema.  RESPIRATORY:  No shortness of breath, cough or sputum.  GASTROINTESTINAL:  No anorexia, nausea, vomiting or diarrhea. No abdominal pain or blood.  GENITOURINARY:  denies incontinence/retention   NEUROLOGICAL:  see hpi  MUSCULOSKELETAL:  No muscle, back pain, joint pain or stiffness.  HEMATOLOGIC:  No anemia, bleeding or bruising.  LYMPHATICS:  No enlarged nodes. No history of splenectomy.  PSYCHIATRIC:  No history of depression or anxiety.  ENDOCRINOLOGIC:  No reports of sweating, cold or heat intolerance. No polyuria or polydipsia.

## 2018-08-08 ENCOUNTER — TRANSCRIPTION ENCOUNTER (OUTPATIENT)
Age: 73
End: 2018-08-08

## 2018-08-08 LAB
ANION GAP SERPL CALC-SCNC: 11 MMOL/L — SIGNIFICANT CHANGE UP (ref 5–17)
BUN SERPL-MCNC: 28 MG/DL — HIGH (ref 7–23)
CALCIUM SERPL-MCNC: 9.6 MG/DL — SIGNIFICANT CHANGE UP (ref 8.4–10.5)
CHLORIDE SERPL-SCNC: 98 MMOL/L — SIGNIFICANT CHANGE UP (ref 96–108)
CO2 SERPL-SCNC: 31 MMOL/L — SIGNIFICANT CHANGE UP (ref 22–31)
CREAT SERPL-MCNC: 1.34 MG/DL — HIGH (ref 0.5–1.3)
GLUCOSE SERPL-MCNC: 87 MG/DL — SIGNIFICANT CHANGE UP (ref 70–99)
HBA1C BLD-MCNC: 5.8 % — HIGH (ref 4–5.6)
HCT VFR BLD CALC: 45 % — SIGNIFICANT CHANGE UP (ref 39–50)
HGB BLD-MCNC: 15.3 G/DL — SIGNIFICANT CHANGE UP (ref 13–17)
MCHC RBC-ENTMCNC: 33.9 GM/DL — SIGNIFICANT CHANGE UP (ref 32–36)
MCHC RBC-ENTMCNC: 34.4 PG — HIGH (ref 27–34)
MCV RBC AUTO: 102 FL — HIGH (ref 80–100)
PLATELET # BLD AUTO: 134 K/UL — LOW (ref 150–400)
POTASSIUM SERPL-MCNC: 3.6 MMOL/L — SIGNIFICANT CHANGE UP (ref 3.5–5.3)
POTASSIUM SERPL-SCNC: 3.6 MMOL/L — SIGNIFICANT CHANGE UP (ref 3.5–5.3)
RBC # BLD: 4.44 M/UL — SIGNIFICANT CHANGE UP (ref 4.2–5.8)
RBC # FLD: 12.5 % — SIGNIFICANT CHANGE UP (ref 10.3–14.5)
SODIUM SERPL-SCNC: 140 MMOL/L — SIGNIFICANT CHANGE UP (ref 135–145)
WBC # BLD: 4.6 K/UL — SIGNIFICANT CHANGE UP (ref 3.8–10.5)
WBC # FLD AUTO: 4.6 K/UL — SIGNIFICANT CHANGE UP (ref 3.8–10.5)

## 2018-08-08 PROCEDURE — 70450 CT HEAD/BRAIN W/O DYE: CPT | Mod: 26

## 2018-08-08 PROCEDURE — 99233 SBSQ HOSP IP/OBS HIGH 50: CPT

## 2018-08-08 RX ORDER — ASPIRIN/CALCIUM CARB/MAGNESIUM 324 MG
81 TABLET ORAL DAILY
Qty: 0 | Refills: 0 | Status: DISCONTINUED | OUTPATIENT
Start: 2018-08-08 | End: 2018-08-09

## 2018-08-08 RX ORDER — ENOXAPARIN SODIUM 100 MG/ML
40 INJECTION SUBCUTANEOUS DAILY
Qty: 0 | Refills: 0 | Status: DISCONTINUED | OUTPATIENT
Start: 2018-08-08 | End: 2018-08-09

## 2018-08-08 RX ADMIN — GABAPENTIN 300 MILLIGRAM(S): 400 CAPSULE ORAL at 23:34

## 2018-08-08 RX ADMIN — Medication 81 MILLIGRAM(S): at 18:27

## 2018-08-08 RX ADMIN — GABAPENTIN 300 MILLIGRAM(S): 400 CAPSULE ORAL at 14:23

## 2018-08-08 RX ADMIN — ENOXAPARIN SODIUM 40 MILLIGRAM(S): 100 INJECTION SUBCUTANEOUS at 18:28

## 2018-08-08 RX ADMIN — GABAPENTIN 300 MILLIGRAM(S): 400 CAPSULE ORAL at 00:46

## 2018-08-08 NOTE — PROGRESS NOTE ADULT - SUBJECTIVE AND OBJECTIVE BOX
THE PATIENT WAS SEEN AND EXAMINED BY ME WITH THE HOUSESTAFF AND STROKE TEAM DURING MORNING ROUNDS.   HPI:  72 years old man with multiple vascular risk factors including atrial fibrillation-on therapeutic anticoagulation at the time of stroke due to "fall risk". He reported to have noticed acute onset of left hand weakness with last known well time being 10:00 AM on date of admission. He presented to [facility] for evaluation. CT brain on admission did not show any evidence of acute infarct or hemorrhage. CTA head and neck did not show any evidence of significant intracranial or extracranial cerebral large vessel severe stenosis or occlusion. On admission NIHSS 0  MRS 1. He received IV tPA on 8/7/18 at 12:09pm.     SUBJECTIVE: No events overnight.  No new neurologic complaints.     ROS: All negative except what is documented above.     gabapentin 300 milliGRAM(s) Oral three times a day    PHYSICAL EXAM:   Vital Signs Last 24 Hrs  T(C): 36.5 (08 Aug 2018 04:00), Max: 36.6 (08 Aug 2018 00:00)  T(F): 97.7 (08 Aug 2018 04:00), Max: 97.9 (08 Aug 2018 00:00)  HR: 60 (08 Aug 2018 07:00) (58 - 64)  BP: 114/68 (08 Aug 2018 07:00) (108/60 - 148/74)  BP(mean): 82 (08 Aug 2018 07:00) (74 - 108)  RR: 13 (08 Aug 2018 07:00) (11 - 23)  SpO2: 96% (08 Aug 2018 07:00) (95% - 99%)    General: No acute distress  HEENT: EOM intact, visual fields full  Abdomen: Soft, nontender, nondistended   Extremities: No edema    NEUROLOGICAL EXAM:  Mental status: Awake, alert, oriented x3, no aphasia, no neglect, normal memory   Cranial Nerves: No facial asymmetry, no nystagmus, no dysarthria,  tongue midline  Motor exam: Normal tone, LUE pronator drift, 5/5 RUE, 5/5 RLE, left hand weakness (involving median, ulnar and radial nerves distribution). 5/5 LLE, normal fine finger movements.  Sensation: Intact to light touch   Coordination/ Gait: No dysmetria, JUAN MANUEL intact and symmetric bilaterally    LABS:                        15.3   4.6   )-----------( 134      ( 08 Aug 2018 05:58 )             45.0    08-08    140  |  98  |  28<H>  ----------------------------<  87  3.6   |  31  |  1.34<H>    Ca    9.6      08 Aug 2018 05:52    TPro  7.6  /  Alb  4.7  /  TBili  1.1  /  DBili  x   /  AST  30  /  ALT  29  /  AlkPhos  106  08-07  PT/INR - ( 07 Aug 2018 11:41 )   PT: 11.7 sec;   INR: 1.08 ratio      PTT - ( 07 Aug 2018 11:41 )  PTT:30.3 sec    IMAGING: Reviewed by me.     Brain CT (08.07.18): No acute hemorrhage or major distribution infarct.    Neck CTA (08.07.18): No evidence of hemodynamically significant stenosis.    Brain CTA (08.07.18): No large vessel occlusion or high-grade stenosis. THE PATIENT WAS SEEN AND EXAMINED BY ME WITH THE HOUSESTAFF AND STROKE TEAM DURING MORNING ROUNDS.     HPI:  72 years old man with multiple vascular risk factors including atrial fibrillation-on therapeutic anticoagulation at the time of stroke due to "fall risk". He reported to have noticed acute onset of left hand weakness with last known well time being 10:00 AM on date of admission. He presented to [facility] for evaluation. CT brain on admission did not show any evidence of acute infarct or hemorrhage. CTA head and neck did not show any evidence of significant intracranial or extracranial cerebral large vessel severe stenosis or occlusion. On admission NIHSS 0  MRS 1. He received IV tPA on 8/7/18 at 12:09pm.     SUBJECTIVE: No events overnight.  No new neurologic complaints.     ROS: All negative except what is documented above.     gabapentin 300 milliGRAM(s) Oral three times a day    PHYSICAL EXAM:   Vital Signs Last 24 Hrs  T(C): 36.5 (08 Aug 2018 04:00), Max: 36.6 (08 Aug 2018 00:00)  T(F): 97.7 (08 Aug 2018 04:00), Max: 97.9 (08 Aug 2018 00:00)  HR: 60 (08 Aug 2018 07:00) (58 - 64)  BP: 114/68 (08 Aug 2018 07:00) (108/60 - 148/74)  BP(mean): 82 (08 Aug 2018 07:00) (74 - 108)  RR: 13 (08 Aug 2018 07:00) (11 - 23)  SpO2: 96% (08 Aug 2018 07:00) (95% - 99%)    General: No acute distress  HEENT: EOM intact, visual fields full  Abdomen: Soft, nontender, nondistended   Extremities: No edema    NEUROLOGICAL EXAM:  Mental status: Awake, alert, oriented x3, no aphasia, no neglect, normal memory   Cranial Nerves: No facial asymmetry, no nystagmus, no dysarthria,  tongue midline  Motor exam: Normal tone, LUE pronator drift, 5/5 RUE, 5/5 RLE, left hand weakness (involving median, ulnar and radial nerves distribution). 5/5 LLE, normal fine finger movements.  Sensation: Intact to light touch   Coordination/ Gait: No dysmetria, JUAN MANUEL intact and symmetric bilaterally    LABS:                        15.3   4.6   )-----------( 134      ( 08 Aug 2018 05:58 )             45.0    08-08    140  |  98  |  28<H>  ----------------------------<  87  3.6   |  31  |  1.34<H>    Ca    9.6      08 Aug 2018 05:52    TPro  7.6  /  Alb  4.7  /  TBili  1.1  /  DBili  x   /  AST  30  /  ALT  29  /  AlkPhos  106  08-07  PT/INR - ( 07 Aug 2018 11:41 )   PT: 11.7 sec;   INR: 1.08 ratio      PTT - ( 07 Aug 2018 11:41 )  PTT:30.3 sec    IMAGING: Reviewed by me.     Brain CT (08.07.18): No acute hemorrhage or major distribution infarct.    Neck CTA (08.07.18): No evidence of hemodynamically significant stenosis.    Brain CTA (08.07.18): No large vessel occlusion or high-grade stenosis.

## 2018-08-08 NOTE — OCCUPATIONAL THERAPY INITIAL EVALUATION ADULT - LIVES WITH, PROFILE
Pt lives in a pvt home with 4 steps to enter with a handrail. 5 steps to bed and bath. +Stall shower with suction GBs. Owns shower stool, rolling walker, and straight cane/spouse

## 2018-08-08 NOTE — OCCUPATIONAL THERAPY INITIAL EVALUATION ADULT - DIAGNOSIS, OT EVAL
Patient with decreased ADL status and impairments with functional mobility due to Patient with decreased ADL status and impairments with functional mobility due to decreased balance, ROM, strength, coordination

## 2018-08-08 NOTE — DISCHARGE NOTE ADULT - CARE PROVIDER_API CALL
Hever Warren), Neurology; Vascular Neurology  [address] [place] [address]  Phone: [phone]  Fax: [phone]

## 2018-08-08 NOTE — DISCHARGE NOTE ADULT - PLAN OF CARE
Stroke management Continue taking medications as reconciled  Follow up with neurology outpatient for continuation of workup  Follow up with your outpatient cardiologist for possible watchman, management of A.fib

## 2018-08-08 NOTE — PHYSICAL THERAPY INITIAL EVALUATION ADULT - ADDITIONAL COMMENTS
pt lives in private home with spouse, 4 steps to enter +HR. Spit level home, additional 5 steps to upstairs/downstairs. Pt amb with straight cane in the home, and rollator/ rolling walker outside the home. Pt also has a scooter for longer distances. Pt was I with all functional mobility prior to admission.

## 2018-08-08 NOTE — PHYSICAL THERAPY INITIAL EVALUATION ADULT - PLANNED THERAPY INTERVENTIONS, PT EVAL
gait training/GOAL: Pt will negotiate 5 steps with 1 HR and step to pattern independently in 4 weeks./transfer training

## 2018-08-08 NOTE — SPEECH LANGUAGE PATHOLOGY EVALUATION - SLP DIAGNOSIS
Order for speech and language evaluation received and appreciated. Case d/w neuro service. Pt with no deficits and for d/c home with outpatient OT only. Defer evaluation at this time. Please reconsult as needed.

## 2018-08-08 NOTE — SWALLOW BEDSIDE ASSESSMENT ADULT - SLP PERTINENT HISTORY OF CURRENT PROBLEM
72 years old man with multiple vascular risk factors including atrial fibrillation-on therapeutic anticoagulation at the time of stroke due to "fall risk". He reports to have noticed acute onset of left hand weakness with last known well time being 10:00 AM. He presented to [facility] for evaluation. Neurological exam shows left nasolabial flattening, subtle LUE pronator drift and left hand weakness (involving median, ulnar and radial nerves distribution). CT brain on admission did not show any evidence of acute infarct or hemorrhage. CTA head and neck did not show any evidence of significant intracranial or extracranial cerebral large vessel severe stenosis or occlusion. Impression: Cerebral embolism with cerebral infarction. Right MCA distribution stroke probably involving hand knob area presenting as "cortical hand" - likely etiology being cardioembolism in the setting of atrial fibrillation and not being on therapeutic anticoagulation NIHSS: 0

## 2018-08-08 NOTE — PROGRESS NOTE ADULT - ASSESSMENT
72 years old man with multiple vascular risk factors including atrial fibrillation-on therapeutic anticoagulation at the time of stroke due to "fall risk". He reported to have noticed acute onset of left hand weakness with last known well time being 10:00 AM on date of admission. He presented to [facility] for evaluation. CT brain on admission did not show any evidence of acute infarct or hemorrhage. CTA head and neck did not show any evidence of significant intracranial or extracranial cerebral large vessel severe stenosis or occlusion. On admission NIHSS 0  MRS 1. He received IV tPA on 8/7/18 at 12:09pm.     Impression:  Cerebral embolism with cerebral infarction. Right MCA distribution stroke probably involving hand knob area presenting as "cortical hand" - likely etiology being cardioembolism in the setting of atrial fibrillation and not being on therapeutic anticoagulation    NEURO: Continue close monitoring for neurologic deterioration, Continue with  24 hours post IV tPA precautions including BP goal<185/110 mmHg before the tPA bolus administration and <180/105 mmHg after tPA bolus for first 24 hours followed by gradual normotension as per protocol, Admission LDL 58- continue on home statin if applicable, MRI Brain w/o, MRA Head w/o and Neck w/contrast??. Physical therapy/OT/Speech eval pending.     ANTITHROMBOTIC THERAPY: Aspirin to be considered at 24 hours after the IV tPA based on repeating brain imaging.    PULMONARY: protecting airway, saturating well     CARDIOVASCULAR: Admission EKG showed Afib, check TTE, cardiac monitoring shows afib- currently rate controlled                              SBP goal: <185/110 mmHg    GASTROINTESTINAL:  dysphagia screen       Diet:     RENAL: BUN/Cr stable, good urine output      Na Goal: Greater than 135     [place]:    HEMATOLOGY: H/H stable, Platelets normal      DVT ppx: Heparin s.c [] LMWH []     ID: afebrile, no leukocytosis     OTHER:     DISPOSITION: Rehab or home depending on PT eval once stable and workup is complete    CORE MEASURES:        Admission NIHSS:      TPA: [] YES [] NO      LDL/HDL:     Depression Screen:      Statin Therapy:     Dysphagia Screen: [] PASS [] FAIL     Smoking [] YES [] NO      Afib [] YES [] NO     Stroke Education [] YES [] NO 72 years old man with multiple vascular risk factors including atrial fibrillation-on therapeutic anticoagulation at the time of stroke due to "fall risk". He reported to have noticed acute onset of left hand weakness with last known well time being 10:00 AM on date of admission. He presented to [facility] for evaluation. CT brain on admission did not show any evidence of acute infarct or hemorrhage. CTA head and neck did not show any evidence of significant intracranial or extracranial cerebral large vessel severe stenosis or occlusion. On admission NIHSS 0  MRS 1. He received IV tPA on 8/7/18 at 12:09pm.     Impression:  Cerebral embolism with cerebral infarction. Right MCA distribution stroke probably involving hand knob area presenting as "cortical hand" - likely etiology being cardioembolism in the setting of atrial fibrillation and not being on therapeutic anticoagulation    NEURO: Continue close monitoring for neurologic deterioration, Continue with  24 hours post IV tPA precautions including BP goal<185/110 mmHg before the tPA bolus administration and <180/105 mmHg after tPA bolus for first 24 hours followed by gradual normotension as per protocol, Repeat CTH post tPA stable, Admission LDL 58- continue on home statin if applicable, MRI Brain w/o, MRA Head w/o and Neck w/contrast??. Physical therapy/OT/Speech eval pending.     ANTITHROMBOTIC THERAPY: Aspirin to be considered at 24 hours after the IV tPA based on repeating brain imaging.    PULMONARY: protecting airway, saturating well     CARDIOVASCULAR: Admission EKG showed Afib, check TTE, cardiac monitoring shows afib- currently rate controlled                              SBP goal: <185/110 mmHg    GASTROINTESTINAL:  dysphagia screen passed tolerating diet.     Diet: Pureed    RENAL: BUN/Cr ARAECLI continue to monitor, good urine output      Na Goal: Greater than 135     [place]: No    HEMATOLOGY: H/H without acute change, Platelets 134- monitor mild thrombocytopenia      DVT ppx: post tpa    ID: afebrile, no leukocytosis     OTHER:     DISPOSITION: Rehab or home depending on PT eval once stable and workup is complete    CORE MEASURES:        Admission NIHSS:      TPA: [X] YES [] NO      LDL/HDL: 58/49     Depression Screen: P     Statin Therapy: yes     Dysphagia Screen: [x] PASS [] FAIL     Smoking [] YES [x] NO      Afib [x] YES [] NO     Stroke Education [x] YES [] NO 72 years old man with multiple vascular risk factors including atrial fibrillation-on therapeutic anticoagulation at the time of stroke due to "fall risk". He reported to have noticed acute onset of left hand weakness with last known well time being 10:00 AM on date of admission. He presented to [facility] for evaluation. CT brain on admission did not show any evidence of acute infarct or hemorrhage. CTA head and neck did not show any evidence of significant intracranial or extracranial cerebral large vessel severe stenosis or occlusion. On admission NIHSS 0  MRS 1. He received IV tPA on 8/7/18 at 12:09pm.     Impression: Cerebral embolism with cerebral infarction. Right MCA distribution stroke probably involving hand knob area presenting as "cortical hand" - likely etiology being cardioembolism in the setting of atrial fibrillation and not being on therapeutic anticoagulation    NEURO: Neurologically without acute change, continue close monitoring for neurologic deterioration, Continue with  24 hours post IV tPA precautions including BP goal<185/110 mmHg before the tPA bolus administration and <180/105 mmHg after tPA bolus for first 24 hours followed by gradual normotension as per protocol, Repeat CTH post tPA without interval change, Admission LDL 58- continue on home statin if applicable, MRI Brain deferred as wound not chnage management. Physical therapy/OT/Speech eval pending.     ANTITHROMBOTIC THERAPY: Aspirin to be considered at 24 hours after the IV tPA based on repeating brain imaging.    PULMONARY: protecting airway, saturating well     CARDIOVASCULAR: Admission EKG showed Afib, check TTE, cardiac monitoring shows afib- currently rate controlled. Will call patients private cardiologist Dr. Tse                               SBP goal: <185/110 mmHg    GASTROINTESTINAL:  dysphagia screen passed tolerating diet.     Diet: Regular    RENAL: BUN/Cr ARACELI continue to monitor, good urine output      Na Goal: Greater than 135     [place]: No    HEMATOLOGY: H/H without acute change, Platelets 134- monitor mild thrombocytopenia      DVT ppx: post tpa    ID: afebrile, no leukocytosis     DISPOSITION: Rehab or home depending on PT eval once stable and workup is complete    CORE MEASURES:        Admission NIHSS: 0     TPA: [X] YES [] NO      LDL/HDL: 58/49     Depression Screen: 0     Statin Therapy: yes     Dysphagia Screen: [x] PASS [] FAIL     Smoking [] YES [x] NO      Afib [x] YES [] NO     Stroke Education [x] YES [] NO 72 years old man with multiple vascular risk factors including atrial fibrillation-on therapeutic anticoagulation at the time of stroke due to "fall risk". He reported to have noticed acute onset of left hand weakness with last known well time being 10:00 AM on date of admission. He presented to [facility] for evaluation. CT brain on admission did not show any evidence of acute infarct or hemorrhage. CTA head and neck did not show any evidence of significant intracranial or extracranial cerebral large vessel severe stenosis or occlusion. On admission NIHSS 0  MRS 1. He received IV tPA on 8/7/18 at 12:09pm.     Impression: Cerebral embolism with cerebral infarction. Right MCA distribution stroke probably involving hand knob area presenting as "cortical hand" - likely etiology being cardioembolism in the setting of atrial fibrillation and not being on therapeutic anticoagulation    NEURO: Neurologically without acute change, continue close monitoring for neurologic deterioration, Continue with  24 hours post IV tPA precautions including BP goal<185/110 mmHg before the tPA bolus administration and <180/105 mmHg after tPA bolus for first 24 hours followed by gradual normotension as per protocol, Repeat CTH post tPA without interval change, Admission LDL 58- continue on home statin if applicable, MRI Brain deferred as wound not chnage management. Physical therapy/OT/Speech eval pending.     ANTITHROMBOTIC THERAPY: Aspirin for secondary stroke prevention    PULMONARY: protecting airway, saturating well     CARDIOVASCULAR: Admission EKG showed Afib, check TTE, cardiac monitoring shows afib- currently rate controlled. Will call patients private cardiologist Dr. Tse                               SBP goal: <185/110 mmHg    GASTROINTESTINAL:  dysphagia screen passed tolerating diet.     Diet: Regular    RENAL: BUN/Cr ARACELI continue to monitor, good urine output      Na Goal: Greater than 135     [place]: No    HEMATOLOGY: H/H without acute change, Platelets 134- monitor mild thrombocytopenia      DVT ppx: Lovenox subq    ID: afebrile, no leukocytosis     DISPOSITION: Rehab or home depending on PT eval once stable and workup is complete    CORE MEASURES:        Admission NIHSS: 0     TPA: [X] YES [] NO      LDL/HDL: 58/49     Depression Screen: 0     Statin Therapy: yes     Dysphagia Screen: [x] PASS [] FAIL     Smoking [] YES [x] NO      Afib [x] YES [] NO     Stroke Education [x] YES [] NO 72 years old man with multiple vascular risk factors including atrial fibrillation-on therapeutic anticoagulation at the time of stroke due to "fall risk". He reported to have noticed acute onset of left hand weakness with last known well time being 10:00 AM on date of admission. He presented to [facility] for evaluation. CT brain on admission did not show any evidence of acute infarct or hemorrhage. CTA head and neck did not show any evidence of significant intracranial or extracranial cerebral large vessel severe stenosis or occlusion. On admission NIHSS 0  MRS 1. He received IV tPA on 8/7/18 at 12:09pm.     Impression: Cerebral embolism with cerebral infarction. Right MCA distribution stroke probably involving hand knob area presenting as "cortical hand" - likely etiology being cardioembolism in the setting of atrial fibrillation and not being on therapeutic anticoagulation    NEURO: Neurologically without acute change, continue close monitoring for neurologic deterioration, Continue with  24 hours post IV tPA precautions including BP goal<185/110 mmHg before the tPA bolus administration and <180/105 mmHg after tPA bolus for first 24 hours followed by gradual normotension as per protocol, Repeat CTH post tPA without interval change, Admission LDL 58- continue on home statin if applicable, MRI Brain deferred as wound not chnage management. Physical therapy/OT recommended home PT/OT.    ANTITHROMBOTIC THERAPY: Aspirin for secondary stroke prevention    PULMONARY: protecting airway, saturating well     CARDIOVASCULAR: Admission EKG showed Afib, check TTE, cardiac monitoring shows afib- currently rate controlled. Will call patients private cardiologist Dr. Tse                               SBP goal: <185/110 mmHg    GASTROINTESTINAL:  dysphagia screen passed tolerating diet.     Diet: Regular    RENAL: BUN/Cr ARACELI continue to monitor, good urine output      Na Goal: Greater than 135     [place]: No    HEMATOLOGY: H/H without acute change, Platelets 134- monitor mild thrombocytopenia      DVT ppx: Lovenox subq    ID: afebrile, no leukocytosis     DISPOSITION: Home with home PT/OT    CORE MEASURES:        Admission NIHSS: 0     TPA: [X] YES [] NO      LDL/HDL: 58/49     Depression Screen: 0     Statin Therapy: yes     Dysphagia Screen: [x] PASS [] FAIL     Smoking [] YES [x] NO      Afib [x] YES [] NO     Stroke Education [x] YES [] NO Assessment:  72 years old man with multiple vascular risk factors including atrial fibrillation-on therapeutic anticoagulation at the time of stroke due to "fall risk". He reported to have noticed acute onset of left hand weakness with last known well time being 10:00 AM on date of admission. He presented to [facility] for evaluation. CT brain on admission did not show any evidence of acute infarct or hemorrhage. CTA head and neck did not show any evidence of significant intracranial or extracranial cerebral large vessel severe stenosis or occlusion. Repeat CT brain did not show any evidence of acute infarct or hemorrhage.   On admission NIHSS 0  mRS 1. He received IV tPA on 8/7/18 at 12:09pm.     Impression:   Cerebral embolism with cerebral infarction. Non-CT imaged right MCA distribution stroke probably involving hand knob area presenting as "cortical hand" - likely etiology being cardioembolism in the setting of atrial fibrillation and not being on therapeutic anticoagulation     NEURO: Neurologically without acute change, continue close monitoring for neurologic deterioration, Continue with  24 hours post IV tPA precautions including BP goal<185/110 mmHg before the tPA bolus administration and <180/105 mmHg after tPA bolus for first 24 hours followed by gradual normotension as per protocol, Repeat CTH post tPA without interval change, Admission LDL 58- continue on home statin if applicable, MRI Brain deferred as wound not chnage management. Physical therapy/OT recommended home PT/OT.    ANTITHROMBOTIC THERAPY: Aspirin for secondary stroke prevention for now. Would optimally benefit from starting therapeutic anticoagulation ([id number] score>1) preferably with DOACs like apixaban for secondary stroke prevention in the setting of atrial fibrillation despite fall risk. Could consider left atrial appendage occluder device like watchman device placement for secondary stroke prevention to avoid lifelong therapeutic anticoagulation in the setting of fall risk      PULMONARY: protecting airway, saturating well     CARDIOVASCULAR: Admission EKG showed Afib, check TTE, cardiac monitoring shows afib- currently rate controlled. Will call patients private cardiologist Dr. Tse                               SBP goal: <185/110 mmHg    GASTROINTESTINAL:  dysphagia screen passed tolerating diet.     Diet: Regular    RENAL: BUN/Cr ARACELI continue to monitor, good urine output      Na Goal: Greater than 135     [place]: No    HEMATOLOGY: H/H without acute change, Platelets 134- monitor mild thrombocytopenia      DVT ppx: Lovenox subq    ID: afebrile, no leukocytosis     DISPOSITION: Home with home PT/OT    CORE MEASURES:        Admission NIHSS: 0     TPA: [X] YES [] NO      LDL/HDL: 58/49     Depression Screen: 0     Statin Therapy: yes     Dysphagia Screen: [x] PASS [] FAIL     Smoking [] YES [x] NO      Afib [x] YES [] NO     Stroke Education [x] YES [] NO Assessment:  72 years old man with multiple vascular risk factors including atrial fibrillation-on therapeutic anticoagulation at the time of stroke due to "fall risk". He reported to have noticed acute onset of left hand weakness with last known well time being 10:00 AM on date of admission. He presented to [facility] for evaluation. CT brain on admission did not show any evidence of acute infarct or hemorrhage. CTA head and neck did not show any evidence of significant intracranial or extracranial cerebral large vessel severe stenosis or occlusion. Repeat CT brain did not show any evidence of acute infarct or hemorrhage.   On admission NIHSS 0  mRS 1. He received IV tPA on 8/7/18 at 12:09pm.     Impression:   Cerebral embolism with cerebral infarction. Non-CT imaged right MCA distribution stroke probably involving hand knob area presenting as "cortical hand" - likely etiology being cardioembolism in the setting of atrial fibrillation and not being on therapeutic anticoagulation     NEURO: Neurologically without acute change, continue close monitoring for neurologic deterioration, Continue with  24 hours post IV tPA precautions including BP goal<185/110 mmHg before the tPA bolus administration and <180/105 mmHg after tPA bolus for first 24 hours followed by gradual normotension as per protocol, Repeat CTH post tPA without interval change, Admission LDL 58- continue on home statin if applicable, MRI Brain deferred as wound not chnage management. Physical therapy/OT recommended home PT/OT.    ANTITHROMBOTIC THERAPY: Aspirin for secondary stroke prevention for now. Would optimally benefit from starting therapeutic anticoagulation ([id number] score>1) preferably with DOACs like apixaban - if deemed to have non-valvular A Fib - for secondary stroke prevention in the setting of atrial fibrillation despite fall risk. Could consider left atrial appendage occluder device like watchman device placement for secondary stroke prevention to avoid lifelong therapeutic anticoagulation in the setting of fall risk. He wanted to confirm with his cardiologist before proceeding with apixaban; tried to contact his cardiologist but without any success.     PULMONARY: protecting airway, saturating well     CARDIOVASCULAR: Admission EKG showed Afib, check TTE, cardiac monitoring shows afib - currently rate controlled. Will call patients private cardiologist Dr. Tse                               SBP goal: <185/110 mmHg    GASTROINTESTINAL:  dysphagia screen passed tolerating diet.     Diet: Regular    RENAL: BUN/Cr ARACELI continue to monitor, good urine output      Na Goal: Greater than 135     [place]: No    HEMATOLOGY: H/H without acute change, Platelets 134 - monitor mild thrombocytopenia      DVT ppx: Lovenox subq    ID: afebrile, no leukocytosis     DISPOSITION: Home with home PT/OT    CORE MEASURES:        Admission NIHSS: 0     TPA: [X] YES [] NO      LDL/HDL: 58/49     Depression Screen: 0     Statin Therapy: yes     Dysphagia Screen: [x] PASS [] FAIL     Smoking [] YES [x] NO      Afib [x] YES [] NO     Stroke Education [x] YES [] NO

## 2018-08-08 NOTE — SWALLOW BEDSIDE ASSESSMENT ADULT - SWALLOW EVAL: DIAGNOSIS
Order for speech and language evaluation received and appreciated. Order d/c as pt upgraded to Regular texture diet. Please reconsult as needed.

## 2018-08-08 NOTE — DISCHARGE NOTE ADULT - HOSPITAL COURSE
72 years old man with multiple vascular risk factors including atrial fibrillation-on therapeutic anticoagulation at the time of stroke due to "fall risk". He reported to have noticed acute onset of left hand weakness with last known well time being 10:00 AM on date of admission. He presented to [facility] for evaluation. CT brain on admission did not show any evidence of acute infarct or hemorrhage. CTA head and neck did not show any evidence of significant intracranial or extracranial cerebral large vessel severe stenosis or occlusion. On admission NIHSS 0  MRS 1. He received IV tPA on 8/7/18 at 12:09pm.     Patient admitted to stroke service. 72 years old man with multiple vascular risk factors including atrial fibrillation-on therapeutic anticoagulation at the time of stroke due to "fall risk". He reported to have noticed acute onset of left hand weakness with last known well time being 10:00 AM on date of admission. He presented to [facility] for evaluation. CT brain on admission did not show any evidence of acute infarct or hemorrhage. CTA head and neck did not show any evidence of significant intracranial or extracranial cerebral large vessel severe stenosis or occlusion. On admission NIHSS 0  MRS 1. He received IV tPA on 8/7/18 at 12:09pm.     Patient admitted to stroke service. Repeat CT head done post TPA window, without interval change. LDL 58, patient continued on home statin. Occupational therapy recommending home with OT. Patient stable for discharge and follow up with outpatient cardiologist for management of Atrial fibrillation and neurologist for stroke management. 72 years old man with multiple vascular risk factors including atrial fibrillation-on therapeutic anticoagulation at the time of stroke due to "fall risk". He reported to have noticed acute onset of left hand weakness with last known well time being 10:00 AM on date of admission. He presented to [facility] for evaluation. CT brain on admission did not show any evidence of acute infarct or hemorrhage. CTA head and neck did not show any evidence of significant intracranial or extracranial cerebral large vessel severe stenosis or occlusion. On admission NIHSS 0  MRS 1. He received IV tPA on 8/7/18 at 12:09pm.     Patient admitted to stroke service. Repeat CT head done post TPA window, without interval change. LDL 58, patient continued on home statin. Occupational therapy recommending home with OT. As per outpatient cardiologist, patient okay to start Eliquis, will follow up outpatient for possible watchman procedure, A. fib management. Patient stable for discharge and follow up with outpatient cardiologist for management of Atrial fibrillation and neurologist for stroke management.

## 2018-08-08 NOTE — PHYSICAL THERAPY INITIAL EVALUATION ADULT - PERTINENT HX OF CURRENT PROBLEM, REHAB EVAL
71 yo M p/w L hand weakness, started 45mins ago, +weak . No pain, but "unusual" sensation in left hand/arm. He has had similar weakness in his extremities and has chronic LE weakness/ gait instability from chronic cervical myelopathy however this was different because it was sudden in onset. CT Brain/neck 8/7; Brain- No acute hemorrhage or major distribution infarct. Neck- No evidence of hemodynamically significant stenosis.  Brain CTA: No large vessel occlusion or high-grade stenosis.

## 2018-08-08 NOTE — DISCHARGE NOTE ADULT - MEDICATION SUMMARY - MEDICATIONS TO TAKE
I will START or STAY ON the medications listed below when I get home from the hospital:    Rapaflo 8 mg oral capsule  -- 1 cap(s) by mouth once a day  -- Indication: For BPH    apixaban 5 mg oral tablet  -- 1 tab(s) by mouth every 12 hours  -- Indication: For Eliquis    gabapentin 300 mg oral capsule  -- 1 cap(s) by mouth 3 times a day  -- Indication: For Neuropathic pain    atorvastatin 10 mg oral tablet  -- 1 tab(s) by mouth once a day (at bedtime)  -- Indication: For Stroke    ALPRAZolam 0.5 mg oral tablet  -- 1 tab(s) by mouth once a day, As needed, Anxiety  -- Indication: For Anxiety    Cortizone-10 topical cream  -- Apply on skin to affected area , As Needed  -- Indication: For Dry skin    furosemide 40 mg oral tablet  -- 1 tab(s) by mouth once a day  -- Indication: For CHF    famotidine 20 mg oral tablet  -- 1 tab(s) by mouth 2 times a day  -- Indication: For GERD    potassium chloride 10 mEq oral tablet, extended release  -- 1 tab(s) by mouth once a day  -- Indication: For Hypokalemia    Fish Oil oral capsule  --  by mouth   -- will stop 1 week pre op  -- Indication: For Nutritional supplement    Artificial Tears ophthalmic solution  -- 1 drop(s) in each affected eye 2 times a day  -- Indication: For Dry eyes    Centrum Silver oral tablet  -- 1 tab(s) by mouth once a day  -- Indication: For Nutritional supplement

## 2018-08-08 NOTE — OCCUPATIONAL THERAPY INITIAL EVALUATION ADULT - ADDITIONAL COMMENTS
CT Brain- Brain CT: No acute hemorrhage or major distribution infarct. Neck CTA: No evidence of hemodynamically significant stenosis. Brain CTA: No large vessel occlusion or high-grade stenosis.

## 2018-08-08 NOTE — OCCUPATIONAL THERAPY INITIAL EVALUATION ADULT - PERTINENT HX OF CURRENT PROBLEM, REHAB EVAL
73 YO RH White M with cervical myelopathy presents with sudden onset L hand weakness. He had difficulty using his hand to perform tasks such as zipping his pants and and opening the door. Symptoms improving but not baseline. He continues to endorse weakness. He has had similar weakness in his extremities and has chronic LE weakness/ gait instability from chronic cervical myelopathy however this was different because it was sudden in onset.

## 2018-08-08 NOTE — OCCUPATIONAL THERAPY INITIAL EVALUATION ADULT - PRECAUTIONS/LIMITATIONS, REHAB EVAL
Cerebral embolism with cerebral infarction. Right MCA distribution stroke probably involving hand knob area presenting as "cortical hand" - likely etiology being cardioembolism in the setting of atrial fibrillation and not being on therapeutic anticoagulation/fall precautions

## 2018-08-08 NOTE — DISCHARGE NOTE ADULT - MEDICATION SUMMARY - MEDICATIONS TO STOP TAKING
I will STOP taking the medications listed below when I get home from the hospital:    aspirin 81 mg oral tablet  -- 1 tab(s) by mouth once a day    metoprolol succinate 25 mg oral tablet, extended release  -- 1 tab(s) by mouth once a day    clopidogrel 75 mg oral tablet  -- 1 tab(s) by mouth once a day

## 2018-08-08 NOTE — DISCHARGE NOTE ADULT - PATIENT PORTAL LINK FT
You can access the [facility] Patient Portal, offered by [facility], by registering with the following website: [email or web address]

## 2018-08-08 NOTE — DISCHARGE NOTE ADULT - CARE PLAN
Principal Discharge DX:	Cerebrovascular accident (CVA), unspecified mechanism  Goal:	Stroke management  Assessment and plan of treatment:	Continue taking medications as reconciled  Follow up with neurology outpatient for continuation of workup  Follow up with your outpatient cardiologist for possible watchman, management of A.fib

## 2018-08-09 VITALS — TEMPERATURE: 98 F

## 2018-08-09 LAB
ANION GAP SERPL CALC-SCNC: 12 MMOL/L — SIGNIFICANT CHANGE UP (ref 5–17)
BUN SERPL-MCNC: 27 MG/DL — HIGH (ref 7–23)
CALCIUM SERPL-MCNC: 9.3 MG/DL — SIGNIFICANT CHANGE UP (ref 8.4–10.5)
CHLORIDE SERPL-SCNC: 97 MMOL/L — SIGNIFICANT CHANGE UP (ref 96–108)
CO2 SERPL-SCNC: 28 MMOL/L — SIGNIFICANT CHANGE UP (ref 22–31)
CREAT SERPL-MCNC: 1.12 MG/DL — SIGNIFICANT CHANGE UP (ref 0.5–1.3)
GLUCOSE SERPL-MCNC: 99 MG/DL — SIGNIFICANT CHANGE UP (ref 70–99)
HCT VFR BLD CALC: 46.2 % — SIGNIFICANT CHANGE UP (ref 39–50)
HGB BLD-MCNC: 15.8 G/DL — SIGNIFICANT CHANGE UP (ref 13–17)
MCHC RBC-ENTMCNC: 34.3 GM/DL — SIGNIFICANT CHANGE UP (ref 32–36)
MCHC RBC-ENTMCNC: 34.5 PG — HIGH (ref 27–34)
MCV RBC AUTO: 101 FL — HIGH (ref 80–100)
PLATELET # BLD AUTO: 136 K/UL — LOW (ref 150–400)
POTASSIUM SERPL-MCNC: 3.5 MMOL/L — SIGNIFICANT CHANGE UP (ref 3.5–5.3)
POTASSIUM SERPL-SCNC: 3.5 MMOL/L — SIGNIFICANT CHANGE UP (ref 3.5–5.3)
RBC # BLD: 4.59 M/UL — SIGNIFICANT CHANGE UP (ref 4.2–5.8)
RBC # FLD: 12.2 % — SIGNIFICANT CHANGE UP (ref 10.3–14.5)
SODIUM SERPL-SCNC: 137 MMOL/L — SIGNIFICANT CHANGE UP (ref 135–145)
WBC # BLD: 5 K/UL — SIGNIFICANT CHANGE UP (ref 3.8–10.5)
WBC # FLD AUTO: 5 K/UL — SIGNIFICANT CHANGE UP (ref 3.8–10.5)

## 2018-08-09 PROCEDURE — 96374 THER/PROPH/DIAG INJ IV PUSH: CPT | Mod: XU

## 2018-08-09 PROCEDURE — 99291 CRITICAL CARE FIRST HOUR: CPT | Mod: 25

## 2018-08-09 PROCEDURE — 82962 GLUCOSE BLOOD TEST: CPT

## 2018-08-09 PROCEDURE — 93306 TTE W/DOPPLER COMPLETE: CPT

## 2018-08-09 PROCEDURE — 93005 ELECTROCARDIOGRAM TRACING: CPT

## 2018-08-09 PROCEDURE — 80053 COMPREHEN METABOLIC PANEL: CPT

## 2018-08-09 PROCEDURE — 70498 CT ANGIOGRAPHY NECK: CPT

## 2018-08-09 PROCEDURE — 80048 BASIC METABOLIC PNL TOTAL CA: CPT

## 2018-08-09 PROCEDURE — 97166 OT EVAL MOD COMPLEX 45 MIN: CPT

## 2018-08-09 PROCEDURE — 93306 TTE W/DOPPLER COMPLETE: CPT | Mod: 26

## 2018-08-09 PROCEDURE — 80061 LIPID PANEL: CPT

## 2018-08-09 PROCEDURE — 85610 PROTHROMBIN TIME: CPT

## 2018-08-09 PROCEDURE — 83036 HEMOGLOBIN GLYCOSYLATED A1C: CPT

## 2018-08-09 PROCEDURE — 70450 CT HEAD/BRAIN W/O DYE: CPT

## 2018-08-09 PROCEDURE — 85027 COMPLETE CBC AUTOMATED: CPT

## 2018-08-09 PROCEDURE — 70496 CT ANGIOGRAPHY HEAD: CPT

## 2018-08-09 PROCEDURE — 85730 THROMBOPLASTIN TIME PARTIAL: CPT

## 2018-08-09 PROCEDURE — 99233 SBSQ HOSP IP/OBS HIGH 50: CPT

## 2018-08-09 PROCEDURE — 84484 ASSAY OF TROPONIN QUANT: CPT

## 2018-08-09 PROCEDURE — 97161 PT EVAL LOW COMPLEX 20 MIN: CPT

## 2018-08-09 RX ORDER — FAMOTIDINE 10 MG/ML
1 INJECTION INTRAVENOUS
Qty: 0 | Refills: 0 | DISCHARGE
Start: 2018-08-09

## 2018-08-09 RX ORDER — METOPROLOL TARTRATE 50 MG
1 TABLET ORAL
Qty: 0 | Refills: 0 | COMMUNITY

## 2018-08-09 RX ORDER — ALPRAZOLAM 0.25 MG
1 TABLET ORAL
Qty: 0 | Refills: 0 | DISCHARGE
Start: 2018-08-09

## 2018-08-09 RX ORDER — FUROSEMIDE 40 MG
1 TABLET ORAL
Qty: 0 | Refills: 0 | DISCHARGE
Start: 2018-08-09

## 2018-08-09 RX ORDER — CLOPIDOGREL BISULFATE 75 MG/1
75 TABLET, FILM COATED ORAL DAILY
Qty: 0 | Refills: 0 | Status: DISCONTINUED | OUTPATIENT
Start: 2018-08-09 | End: 2018-08-09

## 2018-08-09 RX ORDER — FAMOTIDINE 10 MG/ML
1 INJECTION INTRAVENOUS
Qty: 0 | Refills: 0 | COMMUNITY

## 2018-08-09 RX ORDER — APIXABAN 2.5 MG/1
5 TABLET, FILM COATED ORAL EVERY 12 HOURS
Qty: 0 | Refills: 0 | Status: DISCONTINUED | OUTPATIENT
Start: 2018-08-09 | End: 2018-08-09

## 2018-08-09 RX ORDER — FAMOTIDINE 10 MG/ML
20 INJECTION INTRAVENOUS
Qty: 0 | Refills: 0 | Status: DISCONTINUED | OUTPATIENT
Start: 2018-08-09 | End: 2018-08-09

## 2018-08-09 RX ORDER — CLOPIDOGREL BISULFATE 75 MG/1
1 TABLET, FILM COATED ORAL
Qty: 0 | Refills: 0 | COMMUNITY
Start: 2018-08-09

## 2018-08-09 RX ORDER — POTASSIUM CHLORIDE 20 MEQ
1 PACKET (EA) ORAL
Qty: 0 | Refills: 0 | DISCHARGE
Start: 2018-08-09

## 2018-08-09 RX ORDER — POTASSIUM CHLORIDE 20 MEQ
1 PACKET (EA) ORAL
Qty: 0 | Refills: 0 | COMMUNITY

## 2018-08-09 RX ORDER — ATORVASTATIN CALCIUM 80 MG/1
1 TABLET, FILM COATED ORAL
Qty: 0 | Refills: 0 | DISCHARGE
Start: 2018-08-09

## 2018-08-09 RX ORDER — ALPRAZOLAM 0.25 MG
0.5 TABLET ORAL DAILY
Qty: 0 | Refills: 0 | Status: DISCONTINUED | OUTPATIENT
Start: 2018-08-09 | End: 2018-08-09

## 2018-08-09 RX ORDER — ENOXAPARIN SODIUM 100 MG/ML
100 INJECTION SUBCUTANEOUS
Qty: 0 | Refills: 0 | COMMUNITY
Start: 2018-08-09

## 2018-08-09 RX ORDER — CLOPIDOGREL BISULFATE 75 MG/1
1 TABLET, FILM COATED ORAL
Qty: 0 | Refills: 0 | COMMUNITY

## 2018-08-09 RX ORDER — METOPROLOL TARTRATE 50 MG
25 TABLET ORAL DAILY
Qty: 0 | Refills: 0 | Status: DISCONTINUED | OUTPATIENT
Start: 2018-08-09 | End: 2018-08-09

## 2018-08-09 RX ORDER — GABAPENTIN 400 MG/1
1 CAPSULE ORAL
Qty: 0 | Refills: 0 | COMMUNITY

## 2018-08-09 RX ORDER — ATORVASTATIN CALCIUM 80 MG/1
10 TABLET, FILM COATED ORAL AT BEDTIME
Qty: 0 | Refills: 0 | Status: DISCONTINUED | OUTPATIENT
Start: 2018-08-09 | End: 2018-08-09

## 2018-08-09 RX ORDER — GABAPENTIN 400 MG/1
1 CAPSULE ORAL
Qty: 0 | Refills: 0 | DISCHARGE
Start: 2018-08-09

## 2018-08-09 RX ORDER — APIXABAN 2.5 MG/1
1 TABLET, FILM COATED ORAL
Qty: 60 | Refills: 0
Start: 2018-08-09

## 2018-08-09 RX ORDER — METOPROLOL TARTRATE 50 MG
1 TABLET ORAL
Qty: 0 | Refills: 0 | COMMUNITY
Start: 2018-08-09

## 2018-08-09 RX ORDER — ASPIRIN/CALCIUM CARB/MAGNESIUM 324 MG
1 TABLET ORAL
Qty: 0 | Refills: 0 | COMMUNITY
Start: 2018-08-09

## 2018-08-09 RX ORDER — ASPIRIN/CALCIUM CARB/MAGNESIUM 324 MG
1 TABLET ORAL
Qty: 0 | Refills: 0 | COMMUNITY

## 2018-08-09 RX ORDER — FUROSEMIDE 40 MG
2 TABLET ORAL
Qty: 0 | Refills: 0 | COMMUNITY

## 2018-08-09 RX ORDER — FUROSEMIDE 40 MG
40 TABLET ORAL DAILY
Qty: 0 | Refills: 0 | Status: DISCONTINUED | OUTPATIENT
Start: 2018-08-09 | End: 2018-08-09

## 2018-08-09 RX ORDER — ATORVASTATIN CALCIUM 80 MG/1
1 TABLET, FILM COATED ORAL
Qty: 0 | Refills: 0 | COMMUNITY

## 2018-08-09 RX ORDER — ALPRAZOLAM 0.25 MG
1 TABLET ORAL
Qty: 0 | Refills: 0 | COMMUNITY

## 2018-08-09 RX ORDER — POTASSIUM CHLORIDE 20 MEQ
10 PACKET (EA) ORAL DAILY
Qty: 0 | Refills: 0 | Status: DISCONTINUED | OUTPATIENT
Start: 2018-08-09 | End: 2018-08-09

## 2018-08-09 RX ADMIN — GABAPENTIN 300 MILLIGRAM(S): 400 CAPSULE ORAL at 08:09

## 2018-08-09 RX ADMIN — Medication 81 MILLIGRAM(S): at 12:04

## 2018-08-09 RX ADMIN — ENOXAPARIN SODIUM 40 MILLIGRAM(S): 100 INJECTION SUBCUTANEOUS at 12:04

## 2018-08-09 RX ADMIN — APIXABAN 5 MILLIGRAM(S): 2.5 TABLET, FILM COATED ORAL at 15:04

## 2018-08-09 NOTE — PROGRESS NOTE ADULT - NSHPATTENDINGPLANDISCUSS_GEN_ALL_CORE
Neurology resident, medical students and PA on stroke service
Neurology residents, PA, medical students and vascular neurology fellow on stroke service

## 2018-08-09 NOTE — PROGRESS NOTE ADULT - ASSESSMENT
Assessment:  72 years old man with multiple vascular risk factors including atrial fibrillation-on therapeutic anticoagulation at the time of stroke due to "fall risk". He reported to have noticed acute onset of left hand weakness with last known well time being 10:00 AM on date of admission. He presented to [facility] for evaluation. CT brain on admission did not show any evidence of acute infarct or hemorrhage. CTA head and neck did not show any evidence of significant intracranial or extracranial cerebral large vessel severe stenosis or occlusion. Repeat CT brain did not show any evidence of acute infarct or hemorrhage.   On admission NIHSS 0  mRS 1. He received IV tPA on 8/7/18 at 12:09pm.     Impression:   Cerebral embolism with cerebral infarction. Non-CT imaged right MCA distribution stroke probably involving hand knob area presenting as "cortical hand" - likely etiology being cardioembolism in the setting of atrial fibrillation and not being on therapeutic anticoagulation     NEURO: Neurologically without acute change, continue close monitoring for neurologic deterioration, Continue with  24 hours post IV tPA precautions including BP goal<185/110 mmHg before the tPA bolus administration and <180/105 mmHg after tPA bolus for first 24 hours followed by gradual normotension as per protocol, Repeat CTH post tPA without interval change, Admission LDL 58- continue on home statin if applicable, MRI Brain deferred as wound not chnage management. Physical therapy/OT recommended home PT/OT.    ANTITHROMBOTIC THERAPY: Aspirin for secondary stroke prevention for now. Would optimally benefit from starting therapeutic anticoagulation ([id number] score>1) preferably with DOACs like apixaban - if deemed to have non-valvular A Fib - for secondary stroke prevention in the setting of atrial fibrillation despite fall risk. Could consider left atrial appendage occluder device like watchman device placement for secondary stroke prevention to avoid lifelong therapeutic anticoagulation in the setting of fall risk. He wanted to confirm with his cardiologist before proceeding with apixaban; tried to contact his cardiologist but without any success.     PULMONARY: protecting airway, saturating well     CARDIOVASCULAR: Admission EKG showed Afib, check TTE, cardiac monitoring shows afib - currently rate controlled. Will call patients private cardiologist Dr. Tse                               SBP goal: <185/110 mmHg    GASTROINTESTINAL:  dysphagia screen passed tolerating diet.     Diet: Regular    RENAL: BUN/Cr ARACELI continue to monitor, good urine output      Na Goal: Greater than 135     Lawrence: No    HEMATOLOGY: H/H without acute change, Platelets 134 - monitor mild thrombocytopenia      DVT ppx: Lovenox subq    ID: afebrile, no leukocytosis     DISPOSITION: Home with home PT/OT    CORE MEASURES:        Admission NIHSS: 0     TPA: [X] YES [] NO      LDL/HDL: 58/49     Depression Screen: 0     Statin Therapy: yes     Dysphagia Screen: [x] PASS [] FAIL     Smoking [] YES [x] NO      Afib [x] YES [] NO     Stroke Education [x] YES [] NO Assessment:  72 years old man with multiple vascular risk factors including atrial fibrillation-on therapeutic anticoagulation at the time of stroke due to "fall risk". He reported to have noticed acute onset of left hand weakness with last known well time being 10:00 AM on date of admission. He presented to [facility] for evaluation. CT brain on admission did not show any evidence of acute infarct or hemorrhage. CTA head and neck did not show any evidence of significant intracranial or extracranial cerebral large vessel severe stenosis or occlusion. Repeat CT brain did not show any evidence of acute infarct or hemorrhage.   On admission NIHSS 0  mRS 1. He received IV tPA on 8/7/18 at 12:09pm.     Impression:   Cerebral embolism with cerebral infarction. Non-CT imaged right MCA distribution stroke probably involving hand knob area presenting as "cortical hand" - likely etiology being cardioembolism in the setting of atrial fibrillation and not being on therapeutic anticoagulation     NEURO: Neurologically without acute change, gradual normotension, Repeat CTH post tPA without interval change, Admission LDL 58- continue on home statin if applicable, MRI Brain deferred as wound not . Physical therapy/OT recommended home PT/OT.    ANTITHROMBOTIC THERAPY: Started on Apixaban ([id number] score>1) - for secondary stroke prevention in the setting of non-valvular atrial fibrillation despite fall risk. Could consider left atrial appendage occluder device like watchman device placement for secondary stroke prevention to avoid lifelong therapeutic anticoagulation in the setting of fall risk. Risks versus benefits and adverse reactions associated with therapeutic anticoagulation with apixaban including paradoxically increased risk of stroke or MI upon abrupt discontinuation of the medication were discussed with patient and wife at bedside in detail.     PULMONARY: protecting airway, saturating well     CARDIOVASCULAR: Admission EKG showed Afib, TTE EF 55% with moderate AR, no evidence of PFO or thrombus, cardiac monitoring shows afib - currently rate controlled. Will start patient on Apixiban for AC. Plan endorsed over the phone to patients private cardiologist Dr. Tse who is in agreement with plan. Patient has scheduled appointment in his office on 8/10.                                SBP goal: <185/110 mmHg    GASTROINTESTINAL:  dysphagia screen passed tolerating diet.     Diet: Regular    RENAL: BUN/Cr within normal limits, good urine output      Na Goal: Greater than 135     Lawrence: No    HEMATOLOGY: H/H without acute change, Platelets 136 - monitor mild thrombocytopenia ; no signs or symptoms of bleeding noted     DVT ppx: Lovenox subq    ID: afebrile, no leukocytosis     OTHER: Plan endorsed to patient and wife at bedside, all questions and concerns addressed .    DISPOSITION: Home with home PT/OT    CORE MEASURES:        Admission NIHSS: 0     TPA: [X] YES [] NO      LDL/HDL: 58/49     Depression Screen: 0     Statin Therapy: yes     Dysphagia Screen: [x] PASS [] FAIL     Smoking [] YES [x] NO      Afib [x] YES [] NO     Stroke Education [x] YES [] NO Assessment:  72 years old man with multiple vascular risk factors including atrial fibrillation-on therapeutic anticoagulation at the time of stroke due to "fall risk". He reported to have noticed acute onset of left hand weakness with last known well time being 10:00 AM on date of admission. He presented to [facility] for evaluation. CT brain on admission did not show any evidence of acute infarct or hemorrhage. CTA head and neck did not show any evidence of significant intracranial or extracranial cerebral large vessel severe stenosis or occlusion. Repeat CT brain did not show any evidence of acute infarct or hemorrhage. TTE did not show any obvious structural cardiac source of embolism nor show any evidence of valvular heart disease or a PFO.   On admission NIHSS 0  mRS 1. He received IV tPA on 8/7/18 at 12:09pm.     Impression:   Cerebral embolism with cerebral infarction. Non-CT imaged right MCA distribution stroke probably involving hand knob area presenting as "cortical hand" - likely etiology being cardioembolism in the setting of atrial fibrillation and not being on therapeutic anticoagulation     NEURO: Neurologically without acute change, gradual normotension, Repeat CTH post tPA without interval change, Admission LDL 58- continue on home statin if applicable, MRI Brain deferred as wound not . Physical therapy/OT recommended home PT/OT.    ANTITHROMBOTIC THERAPY: Started on therapeutic anticoagulation with apixaban ([id number] score>1) - for secondary stroke prevention in the setting of non-valvular atrial fibrillation. Could consider left atrial appendage occluder device like watchman device placement for secondary stroke prevention to avoid lifelong therapeutic anticoagulation in the setting of "fall risk". Risks versus benefits and adverse reactions associated with therapeutic anticoagulation with apixaban including paradoxically increased risk of stroke or MI upon abrupt discontinuation of the medication were discussed with patient and wife at bedside in detail.     PULMONARY: protecting airway, saturating well     CARDIOVASCULAR: Admission EKG showed Afib, TTE EF 55% with moderate AR, no evidence of PFO or thrombus, cardiac monitoring shows afib - currently rate controlled. Will start patient on Apixiban for AC. Plan endorsed over the phone to patients private cardiologist Dr. Tse who is in agreement with plan. Patient has scheduled appointment in his office on 8/10.                                SBP goal: <185/110 mmHg    GASTROINTESTINAL:  dysphagia screen passed tolerating diet.     Diet: Regular    RENAL: BUN/Cr within normal limits, good urine output      Na Goal: Greater than 135     Lawrence: No    HEMATOLOGY: H/H without acute change, Platelets 136 - monitor mild thrombocytopenia ; no signs or symptoms of bleeding noted     DVT ppx: Lovenox subq    ID: afebrile, no leukocytosis     OTHER: Plan endorsed to patient and wife at bedside, all questions and concerns addressed .    DISPOSITION: Home with home PT/OT    CORE MEASURES:        Admission NIHSS: 0     TPA: [X] YES [] NO      LDL/HDL: 58/49     Depression Screen: 0     Statin Therapy: yes     Dysphagia Screen: [x] PASS [] FAIL     Smoking [] YES [x] NO      Afib [x] YES [] NO     Stroke Education [x] YES [] NO

## 2018-08-09 NOTE — PROGRESS NOTE ADULT - SUBJECTIVE AND OBJECTIVE BOX
THE PATIENT WAS SEEN AND EXAMINED BY ME WITH THE HOUSESTAFF AND STROKE TEAM DURING MORNING ROUNDS.     HPI:  72 years old man with multiple vascular risk factors including atrial fibrillation-on therapeutic anticoagulation at the time of stroke due to "fall risk". He reported to have noticed acute onset of left hand weakness with last known well time being 10:00 AM on date of admission. He presented to [facility] for evaluation. CT brain on admission did not show any evidence of acute infarct or hemorrhage. CTA head and neck did not show any evidence of significant intracranial or extracranial cerebral large vessel severe stenosis or occlusion. On admission NIHSS 0  MRS 1. He received IV tPA on 8/7/18 at 12:09pm.     SUBJECTIVE: No events overnight.  No new neurologic complaints.     ROS: All negative except what is documented above.     aspirin enteric coated 81 milliGRAM(s) Oral daily  atorvastatin 10 milliGRAM(s) Oral at bedtime  enoxaparin Injectable 40 milliGRAM(s) SubCutaneous daily  gabapentin 300 milliGRAM(s) Oral three times a day    PHYSICAL EXAM:   Vital Signs Last 24 Hrs  T(C): 36.3 (09 Aug 2018 08:00), Max: 36.7 (08 Aug 2018 22:09)  T(F): 97.4 (09 Aug 2018 08:00), Max: 98.1 (09 Aug 2018 04:00)  HR: 60 (09 Aug 2018 08:00) (60 - 79)  BP: 129/64 (09 Aug 2018 08:00) (106/68 - 136/75)  BP(mean): 82 (09 Aug 2018 08:00) (79 - 93)  RR: 14 (09 Aug 2018 08:00) (8 - 23)  SpO2: 98% (09 Aug 2018 08:00) (80% - 100%)    General: No acute distress  HEENT: EOM intact, visual fields full  Abdomen: Soft, nontender, nondistended   Extremities: No edema    NEUROLOGICAL EXAM:  Mental status: Awake, alert, oriented x3, no aphasia, no neglect, normal memory   Cranial Nerves: No facial asymmetry, no nystagmus, no dysarthria,  tongue midline  Motor exam: Normal tone, LUE pronator drift, 5/5 RUE, 5/5 RLE, left hand weakness (involving median, ulnar and radial nerves distribution). 5/5 LLE, normal fine finger movements.  Sensation: Intact to light touch   Coordination/ Gait: No dysmetria, JUAN MANUEL intact and symmetric bilaterally    LABS:                        15.8   5.0   )-----------( 136      ( 09 Aug 2018 05:37 )             46.2    08-09    137  |  97  |  27<H>  ----------------------------<  99  3.5   |  28  |  1.12    Ca    9.3      09 Aug 2018 05:37    TPro  7.6  /  Alb  4.7  /  TBili  1.1  /  DBili  x   /  AST  30  /  ALT  29  /  AlkPhos  106  08-07  PT/INR - ( 07 Aug 2018 11:41 )   PT: 11.7 sec;   INR: 1.08 ratio       PTT - ( 07 Aug 2018 11:41 )  PTT:30.3 sec  Hemoglobin A1C, Whole Blood: 5.8 % (08-08 @ 08:12)    IMAGING: Reviewed by me.     CT Head No Cont (08.08.18)  No significant change when allowing for differences in   technique.    Brain CT (08.07.18): No acute hemorrhage or major distribution infarct.    Neck CTA (08.07.18): No evidence of hemodynamically significant stenosis.    Brain CTA (08.07.18): No large vessel occlusion or high-grade stenosis. THE PATIENT WAS SEEN AND EXAMINED BY ME WITH THE HOUSESTAFF AND STROKE TEAM DURING MORNING ROUNDS.     HPI:  72 years old man with multiple vascular risk factors including atrial fibrillation-on therapeutic anticoagulation at the time of stroke due to "fall risk". He reported to have noticed acute onset of left hand weakness with last known well time being 10:00 AM on date of admission. He presented to [facility] for evaluation. CT brain on admission did not show any evidence of acute infarct or hemorrhage. CTA head and neck did not show any evidence of significant intracranial or extracranial cerebral large vessel severe stenosis or occlusion. On admission NIHSS 0  MRS 1. He received IV tPA on 8/7/18 at 12:09pm.     SUBJECTIVE: No events overnight.  No new neurologic complaints.     ROS: All negative except what is documented above.     aspirin enteric coated 81 milliGRAM(s) Oral daily  atorvastatin 10 milliGRAM(s) Oral at bedtime  enoxaparin Injectable 40 milliGRAM(s) SubCutaneous daily  gabapentin 300 milliGRAM(s) Oral three times a day    PHYSICAL EXAM:   Vital Signs Last 24 Hrs  T(C): 36.3 (09 Aug 2018 08:00), Max: 36.7 (08 Aug 2018 22:09)  T(F): 97.4 (09 Aug 2018 08:00), Max: 98.1 (09 Aug 2018 04:00)  HR: 60 (09 Aug 2018 08:00) (60 - 79)  BP: 129/64 (09 Aug 2018 08:00) (106/68 - 136/75)  BP(mean): 82 (09 Aug 2018 08:00) (79 - 93)  RR: 14 (09 Aug 2018 08:00) (8 - 23)  SpO2: 98% (09 Aug 2018 08:00) (80% - 100%)    General: No acute distress  HEENT: EOM intact, visual fields full  Abdomen: Soft, nontender, nondistended   Extremities: No edema    NEUROLOGICAL EXAM:  Mental status: Awake, alert, oriented x3, no neglect, normal memory, follows simple and crossed commands  Cranial Nerves: No facial asymmetry, no nystagmus, no dysarthria,  tongue midline  Motor exam: Normal tone, LUE pronator drift, 5/5 RUE, 5/5 RLE, left hand weakness (involving median, ulnar and radial nerves distribution). 5/5 LLE, normal fine finger movements.  Sensation: Intact to light touch   Coordination/ Gait: No dysmetria, JUAN MANUEL intact and symmetric bilaterally    LABS:                        15.8   5.0   )-----------( 136      ( 09 Aug 2018 05:37 )             46.2    08-09    137  |  97  |  27<H>  ----------------------------<  99  3.5   |  28  |  1.12    Ca    9.3      09 Aug 2018 05:37    TPro  7.6  /  Alb  4.7  /  TBili  1.1  /  DBili  x   /  AST  30  /  ALT  29  /  AlkPhos  106  08-07  PT/INR - ( 07 Aug 2018 11:41 )   PT: 11.7 sec;   INR: 1.08 ratio       PTT - ( 07 Aug 2018 11:41 )  PTT:30.3 sec  Hemoglobin A1C, Whole Blood: 5.8 % (08-08 @ 08:12)    IMAGING: Reviewed by me.     CT Head No Cont (08.08.18)  No significant change when allowing for differences in   technique.    Brain CT (08.07.18): No acute hemorrhage or major distribution infarct.    Neck CTA (08.07.18): No evidence of hemodynamically significant stenosis.    Brain CTA (08.07.18): No large vessel occlusion or high-grade stenosis.

## 2018-08-13 RX ORDER — CLOPIDOGREL BISULFATE 75 MG/1
75 TABLET, FILM COATED ORAL
Qty: 30 | Refills: 0 | Status: DISCONTINUED | COMMUNITY
Start: 2018-01-22 | End: 2018-08-13

## 2018-08-13 RX ORDER — FAMOTIDINE 20 MG/1
20 TABLET, FILM COATED ORAL TWICE DAILY
Refills: 0 | Status: ACTIVE | COMMUNITY

## 2018-08-13 RX ORDER — DEXLANSOPRAZOLE 30 MG/1
30 CAPSULE, DELAYED RELEASE ORAL DAILY
Refills: 0 | Status: DISCONTINUED | COMMUNITY
End: 2018-08-13

## 2018-08-13 RX ORDER — MELOXICAM 15 MG/1
15 TABLET ORAL
Refills: 0 | Status: DISCONTINUED | COMMUNITY
End: 2018-08-13

## 2018-08-13 RX ORDER — METOPROLOL SUCCINATE 25 MG/1
25 TABLET, EXTENDED RELEASE ORAL
Refills: 0 | Status: DISCONTINUED | COMMUNITY
End: 2018-08-13

## 2018-08-13 RX ORDER — YOHIMBE BARK 500 MG
100 CAPSULE ORAL
Refills: 0 | Status: DISCONTINUED | COMMUNITY
End: 2018-08-13

## 2018-08-15 ENCOUNTER — APPOINTMENT (OUTPATIENT)
Dept: NEUROLOGY | Facility: CLINIC | Age: 73
End: 2018-08-15
Payer: MEDICARE

## 2018-08-15 VITALS
WEIGHT: 180 LBS | DIASTOLIC BLOOD PRESSURE: 68 MMHG | SYSTOLIC BLOOD PRESSURE: 120 MMHG | HEART RATE: 68 BPM | BODY MASS INDEX: 24.38 KG/M2 | HEIGHT: 72 IN

## 2018-08-15 DIAGNOSIS — Z86.79 PERSONAL HISTORY OF OTHER DISEASES OF THE CIRCULATORY SYSTEM: ICD-10-CM

## 2018-08-15 DIAGNOSIS — Z78.9 OTHER SPECIFIED HEALTH STATUS: ICD-10-CM

## 2018-08-15 PROCEDURE — 99215 OFFICE O/P EST HI 40 MIN: CPT

## 2018-08-15 RX ORDER — OXYCODONE HYDROCHLORIDE AND ACETAMINOPHEN 5; 325 MG/1; MG/1
5-325 TABLET ORAL
Refills: 0 | Status: ACTIVE | COMMUNITY

## 2018-08-22 ENCOUNTER — APPOINTMENT (OUTPATIENT)
Dept: NEUROLOGY | Facility: CLINIC | Age: 73
End: 2018-08-22
Payer: MEDICARE

## 2018-08-22 PROCEDURE — 93892 TCD EMBOLI DETECT W/O INJ: CPT

## 2018-08-22 PROCEDURE — 93880 EXTRACRANIAL BILAT STUDY: CPT

## 2018-08-22 PROCEDURE — 93886 INTRACRANIAL COMPLETE STUDY: CPT

## 2018-08-29 ENCOUNTER — APPOINTMENT (OUTPATIENT)
Dept: ORTHOPEDIC SURGERY | Facility: CLINIC | Age: 73
End: 2018-08-29
Payer: MEDICARE

## 2018-08-29 VITALS — WEIGHT: 180 LBS | HEIGHT: 72 IN | BODY MASS INDEX: 24.38 KG/M2

## 2018-08-29 PROCEDURE — 99214 OFFICE O/P EST MOD 30 MIN: CPT

## 2018-09-11 ENCOUNTER — APPOINTMENT (OUTPATIENT)
Dept: NEUROLOGY | Facility: CLINIC | Age: 73
End: 2018-09-11

## 2018-09-11 ENCOUNTER — RX RENEWAL (OUTPATIENT)
Age: 73
End: 2018-09-11

## 2018-11-12 ENCOUNTER — APPOINTMENT (OUTPATIENT)
Dept: NEUROLOGY | Facility: CLINIC | Age: 73
End: 2018-11-12
Payer: MEDICARE

## 2018-11-12 VITALS
HEIGHT: 72 IN | SYSTOLIC BLOOD PRESSURE: 137 MMHG | BODY MASS INDEX: 24.79 KG/M2 | HEART RATE: 61 BPM | WEIGHT: 183 LBS | DIASTOLIC BLOOD PRESSURE: 69 MMHG

## 2018-11-12 PROCEDURE — 99215 OFFICE O/P EST HI 40 MIN: CPT

## 2018-11-15 ENCOUNTER — APPOINTMENT (OUTPATIENT)
Dept: NEUROLOGY | Facility: CLINIC | Age: 73
End: 2018-11-15
Payer: MEDICARE

## 2018-11-15 VITALS
SYSTOLIC BLOOD PRESSURE: 112 MMHG | WEIGHT: 183 LBS | HEIGHT: 72 IN | HEART RATE: 58 BPM | BODY MASS INDEX: 24.79 KG/M2 | DIASTOLIC BLOOD PRESSURE: 59 MMHG

## 2018-11-15 PROCEDURE — 99214 OFFICE O/P EST MOD 30 MIN: CPT

## 2018-12-19 ENCOUNTER — CHART COPY (OUTPATIENT)
Age: 73
End: 2018-12-19

## 2019-01-20 NOTE — OCCUPATIONAL THERAPY INITIAL EVALUATION ADULT - FINE MOTOR COORDINATION, RIGHT HAND THUMB/FINGER OPPOSITION SKILLS, OT EVAL
[FreeTextEntry3] : I reviewed the note and edited it in the key areas.  I was present for key portions of this interaction with the NP and discussed the case with them.\par  normal performance

## 2019-06-14 ENCOUNTER — APPOINTMENT (OUTPATIENT)
Dept: NEUROLOGY | Facility: CLINIC | Age: 74
End: 2019-06-14

## 2019-06-21 ENCOUNTER — APPOINTMENT (OUTPATIENT)
Dept: NEUROLOGY | Facility: CLINIC | Age: 74
End: 2019-06-21
Payer: MEDICARE

## 2019-06-21 VITALS
WEIGHT: 185 LBS | SYSTOLIC BLOOD PRESSURE: 132 MMHG | DIASTOLIC BLOOD PRESSURE: 61 MMHG | HEIGHT: 72 IN | BODY MASS INDEX: 25.06 KG/M2 | HEART RATE: 62 BPM

## 2019-06-21 PROCEDURE — 99214 OFFICE O/P EST MOD 30 MIN: CPT

## 2019-06-21 PROCEDURE — 93888 INTRACRANIAL LIMITED STUDY: CPT

## 2019-06-21 PROCEDURE — 93880 EXTRACRANIAL BILAT STUDY: CPT

## 2019-08-26 ENCOUNTER — APPOINTMENT (OUTPATIENT)
Dept: ORTHOPEDIC SURGERY | Facility: CLINIC | Age: 74
End: 2019-08-26
Payer: MEDICARE

## 2019-08-26 VITALS
HEART RATE: 69 BPM | HEIGHT: 72 IN | SYSTOLIC BLOOD PRESSURE: 114 MMHG | DIASTOLIC BLOOD PRESSURE: 64 MMHG | WEIGHT: 182 LBS | BODY MASS INDEX: 24.65 KG/M2

## 2019-08-26 PROCEDURE — 72040 X-RAY EXAM NECK SPINE 2-3 VW: CPT

## 2019-08-26 PROCEDURE — 72100 X-RAY EXAM L-S SPINE 2/3 VWS: CPT

## 2019-08-26 PROCEDURE — 99214 OFFICE O/P EST MOD 30 MIN: CPT

## 2019-08-29 ENCOUNTER — APPOINTMENT (OUTPATIENT)
Dept: NEUROLOGY | Facility: CLINIC | Age: 74
End: 2019-08-29
Payer: MEDICARE

## 2019-08-29 VITALS
HEART RATE: 89 BPM | SYSTOLIC BLOOD PRESSURE: 116 MMHG | BODY MASS INDEX: 24.65 KG/M2 | WEIGHT: 182 LBS | HEIGHT: 72 IN | DIASTOLIC BLOOD PRESSURE: 62 MMHG

## 2019-08-29 PROCEDURE — 99215 OFFICE O/P EST HI 40 MIN: CPT

## 2019-08-30 NOTE — HISTORY OF PRESENT ILLNESS
[FreeTextEntry1] : Mr. Clemens is a 73-year-old  male last seen approximately one year ago returns for followup evaluation and believes that his gait has been slightly worse and experiences additional mild weakness in the right proximal lower extremity muscle groups the difficulty in climbing stairs and his balance has been slightly worse without any symptoms in the upper extremities and denied any bowel or bladder dysfunction or worsening of the symptoms elsewhere. He was recently evaluated by Dr. Hunt who advised physical therapy and followup evaluation in this office.\par \par He denied any tingling numbness bowel or bladder dysfunction abdominal or chest pain there is no myoclonic jerks or muscle twitching and denied any significant neck or back pain and in fact there are no radicular symptoms but there is discomfort in the right gluteal paralumbar area without evidence to the dermatomes of the right proximal leg.\par \par The patient has multiple comorbidities including history of pacemaker, status post CABG, aortic valve incompetence, atrial fibrillation and is currently on anticoagulants and cannot get MRI of the lumbar spine. I reviewed his medications and allergies are reviewed Dr. Hunt records.

## 2019-08-30 NOTE — REVIEW OF SYSTEMS
[Leg Weakness] : leg weakness [Poor Coordination] : poor coordination [Difficulty Walking] : difficulty walking [Ataxia] : ataxia [As noted in HPI] : as noted in HPI [As Noted in HPI] : as noted in HPI [Negative] : Heme/Lymph [Memory Lapses or Loss] : no memory loss [Decr. Concentrating Ability] : no decrease in concentrating ability [Difficulty with Language] : no ~M difficulty with language [Changed Thought Patterns] : no change in thought patterns [Facial Weakness] : no facial weakness [Hand Weakness] : no hand weakness [Arm Weakness] : no arm weakness [Numbness] : no numbness [Tingling] : no tingling [Abnormal Sensation] : no abnormal sensation [Dizziness] : no dizziness [Fainting] : no fainting [Vertigo] : no vertigo [Cluster Headache] : no cluster headache [Migraine Headache] : no migraine headache [Inability to Walk] : able to walk [Frequent Falls] : not falling

## 2019-08-30 NOTE — DATA REVIEWED
[de-identified] : I reviewed the consultation report by Dr. Wang Parker and noted his evaluation including the formulation and a prescription for physical therapy.\par \par My last evaluation approximately 10 months ago revealed that he had 3/5 strength in the proximal right lower extremity muscles and mild weakness on the left.

## 2019-08-30 NOTE — DISCUSSION/SUMMARY
[FreeTextEntry1] : Opinion\par \par The patient has history of myelopathy, lumbar stenosis and had surgical procedures and currently I do not find any worsening of his neurological evaluation as compared to November 2018 and in fact his current strength is much better than the previous evaluation. It is conceivable that he has proximal L2 radiculopathy on the right side or this is simply deconditioning and I think physical therapy is a faint good alternative to strengthen his muscles. Even if EMG is performed he is not a surgical candidate and in the past and he says he has been ruled out because of significant comorbidities. I had a long conversation with the patient and advised continued physical therapy and to return back in approximately 2 months following the completion of therapy and if he continues to experience worsening of his symptoms I may undertake electrophysiologic testing and CT scan of the lumbar spine and he understands his options. Education was provided and an encouraged to strengthen the muscles to physical therapy and continue with his physicians for multiple medical issues. Fall precautions were discussed.

## 2019-08-30 NOTE — PHYSICAL EXAM
[FreeTextEntry1] : Gen. examination is unremarkable. There is no carotid bruit, thyromegaly or lymphadenopathy. Chest is clear and heart sounds are normal. Abdomen is soft and there is no tenderness. Pedal pulsations are normal. Straight leg raising test is in fact negative but has tightness in the hamstring muscles on the right. There is no leg edema.\par \par Neurologically memory language and cognitive abilities are normal. Optic discs are normal visual fields are full eye movements are normal and there is no facial weakness. Bulbar functions are intact. Upper extremity motor strength is symmetric and normal with symmetric reflexes and there is no incoordination. Sensations are preserved.\par \par Extremity evaluation revealed 3/5 strength of the right iliopsoas and -5/5 strength of the right quadriceps as all of her muscles are 5/5 which is significant better strength than his last evaluation of November 2018. He has 2+ knee reflexes bilaterally and ankles are absent and there is bilateral Babinski sign from previous myelopathy. Tone is increased in the legs and coordination is slow but normal and his sensations are preserved to position and pin perception but vibration is diminished in the feet and there is no sensory level. He walks with a walker and a spastic ataxic gait.

## 2019-10-14 ENCOUNTER — APPOINTMENT (OUTPATIENT)
Dept: NEUROLOGY | Facility: CLINIC | Age: 74
End: 2019-10-14

## 2019-10-31 ENCOUNTER — APPOINTMENT (OUTPATIENT)
Dept: NEUROLOGY | Facility: CLINIC | Age: 74
End: 2019-10-31
Payer: MEDICARE

## 2019-10-31 VITALS
HEART RATE: 69 BPM | BODY MASS INDEX: 24.38 KG/M2 | SYSTOLIC BLOOD PRESSURE: 140 MMHG | DIASTOLIC BLOOD PRESSURE: 66 MMHG | WEIGHT: 180 LBS | HEIGHT: 72 IN

## 2019-10-31 PROCEDURE — 99215 OFFICE O/P EST HI 40 MIN: CPT

## 2019-10-31 NOTE — HISTORY OF PRESENT ILLNESS
[FreeTextEntry1] : Mr. Clemens is a 73 years old R handed man with vascular risk factors of age, ? HPLD, CAD s/p CABG and atrial fibrillation -not on therapeutic anticoagulation at the time of stroke due to "fall risk" is seen in the vascular neurology office for the evaluation and management of stroke, leading to hospital admission in 8/18.\par \par He has no new neurological symptoms, went into the clinical studies, chosen to be on the control arm couple years back, now qualifies for the watchman procedure with  [facility]. \par \par On 8/7/18, he noticed acute onset of left hand weakness as she was not able to zip the fly on his pants. He started noticing some improvement in the left hand weakness but continued to have loss of dexterity. He presented to [facility] for further evaluation and was treated with IV tPA with significant improvement in the neurological symptoms/deficits within 24 hours. He reports to be taking aspirin before his stroke. He denies any family history of stroke at young age. He denies any personal or family history of DVT/PEs. He reports to be diagnosed to have A Fib after CABG surgery and he was not therapeutic anticoagulation due to significant fall risks due to cervical myelopathy. He was in the process of Watchman device placement through clinical trial but was randomized to control group. \par \par ROS: All negative except documented in the history of present illness.\par \par Workup:\par CT brain (8/7/18): No evidence of acute infarct or hemorrhage\par CTA head and neck (8/7/18): No evidence of symptomatic intracranial or extracranial cerebral large vessel severe stenosis or occlusion\par CT brain (8/8/18): No evidence of acute infarct or hemorrhage\par LDL: 58 (8/18)\par HbA1c: 5.8 (8/18)\par Transverse echocardiogram: Normal mitral valve, mild mitral regurgitation, moderate aortic regurgitation, normal left atrium, mild segment the left ventricular systolic dysfunction, agitated saline injection and color flow Doppler demonstrates no evidence of a PFO\par \par Home medications:\par Reviewed with the patient and updated as appropriate. \par \par He reports almost complete resolution of left hand weakness. His current post-stroke mRS is reported to be 1.

## 2019-10-31 NOTE — PHYSICAL EXAM
[FreeTextEntry1] : General exam: Sitting in the chair and does not appear to be in distress\par Carotid bruits: None\par CVS: S1-S2 present, irregularly irregular\par RS: CTA B\par Skin: No lesion noted on visible skin, except a small bruise over the left lateral chest \par \par Neuro exam: \par MS: Alert, awake and is oriented to time, place and person with normal attention span, normal recent and remote memory\par Language: Fluent speech with intact comprehension, with intact naming and repetition, no right-left confusion, no finger agnosia and no apraxia. Normal fund of knowledge. No dysarthria. \par Cr.N.: Pupils bilaterally 3-4 mm in size, equal, round and reactive to light, fundus examination was performed but I was unable to locate the discs due to technical difficulties including poor fixation, intact visual fields to confrontation, extraocular movements intact (right eye exophoria) without any diplopia, no ptosis, no nystagmus, face appears to be symmetric with intact facial sensations, no hearing loss to rubbing fingers, tongue is in the midline, uvula elevates in the midline and without any drooping of the soft palate, normal shrugging of the shoulders bilaterally.\par \par Motor: \par Tone - Normal\par Bulk - Atrophy bilateral hip flexors and bilateral hand muscles\par Power - No pronator drift\par RUE - Proximally 5/5, distally 5-/5\par RLE - Proximally 5/5, distally 5-/5, slightly reduced fine finger movements\par LUE - Proximally 4-/5, distally 5/5\par LLE - Proximally 4-/5, distally 5-/5\par DTRs - +1 in UEs and absent in LEs and bilaterally symmetric\par Plantars: Bilaterally extensors, bilateral Loren present\par Sensory: Intact to light touch and pinprick, no sensory extinction. \par Cerebellar: No ataxia on finger-nose-finger and limited testing of knee-heel-shin due to significant weakness, as well as without any dysdiadochokinesia\par Gait: Was able to stand up without support and was able to walk for a few steps without his walker but with scissoring gait \par Romberg's sign - Absent.

## 2019-10-31 NOTE — DISCUSSION/SUMMARY
[Antithrombotic therapy with ___] : antithrombotic therapy with  [unfilled] [Intensive Blood Pressure Control] : intensive blood pressure control [Lipid Lowering Therapy] : lipid lowering therapy [Patient encouraged to discuss with Primary MD] : I encouraged the patient to discuss these important issues with ~his/her~ primary care doctor [Goals and Counseling] : I have reviewed the goals of stroke risk factor modification. I counseled the patient on measures to reduce stroke risk, including the importance of medication compliance, risk factor control, exercise, healthy diet and avoidance of smoking. I reviewed stroke warning signs and symptoms and appropriate actions to take if such occur. [FreeTextEntry1] : Assessment:\par 73 years R handed old man with vascular risk factors of age, HPLD, CAD s/p CABG and atrial fibrillation-not on therapeutic anticoagulation at the time of stroke due to "fall risk" is seen in the vascular neurology office for the evaluation and management of stroke, leading to hospital admission in 8/18. On 8/7/18, he had an acute onset of left hand weakness. He presented to [facility] and was treated with IV tPA. He was not therapeutic anticoagulation at the time of stroke due to significant fall risks due to cervical myelopathy. CT brain (8/7/18) and (8/8/18) did not show any evidence of acute infarct or hemorrhage. CTA head and neck (8/7/18) did not show any evidence of symptomatic intracranial or extracranial cerebral large vessel severe stenosis or occlusion. TTE did not show any obvious structural cardiac source of embolism nor showed any significant valvular heart disease or a PFO. \par \par Impression: \par Cerebral embolism with cerebral infarction. Non-CT imaged right MCA distribution stroke probably involving hand knob area presenting as "cortical hand" - likely etiology being cardio embolism in the setting of atrial fibrillation and not being on therapeutic anticoagulation at the time of stroke\par \par Plan:\par - Therapeutic anticoagulation ([id number] score>1) for secondary stroke prevention, indefinitely if not contraindicated due to any specific reasons in the future. Agree to continue with apixaban for therapeutic anticoagulation due to non-valvular heart disease. \par - Considering his significant fall risk secondary to cervical myelopathy and results of PROTECT-AF clinical trial, left atrial appendage occlude device like Watchman device could be considered for secondary stroke prevention to avoid lifelong anticoagulation it is significant bleeding risk associated with recurrent falls. Advised to consult with his cardiologist to discuss risks vs benefits of Watchman device placement procedure. Advantages/disadvantages of watchman device placement for secondary stroke prevention were discussed with him in detail - has appointment at [facility] for consult - likely to get it this summer.\par \par - Agree to continue with aspirin in addition to therapeutic anticoagulation due to his history of CAD/CABG\par - Atorvastatin 10 mg at bedtime as per home medication; considering likely non atheroembolic/cardioembolic etiology of the stroke further dose titration is deferred to PCP/cardiologist\par - He should follow up closely with his primary care physician for optimal vascular risk factors modifications including blood pressure goal less than 130/80 mmHg, HbA1c goal <7 and preferably 6-6.5 and optimal cholesterol management \par - He is advised to check blood pressure regularly at home, preferably at different times during the day and multiple days a week and was advised to keep a log of BPs to bring to primary care physician visit for further instructions regarding optimal BP management. Also advised to follow up closely with PCP regarding regular blood work including monitoring of HbA1c and cholesterol profile\par - Follow up with cardiologist for further management of A Fib and evaluation of Watchman device placement\par - Advised to follow up with his PCP regarding optimal management of thyroid nodule incidentally noted on CUS - strongly enforced\par \par

## 2019-10-31 NOTE — REVIEW OF SYSTEMS
[Anxiety] : anxiety [As Noted in HPI] : as noted in HPI [Negative] : Heme/Lymph [Suicidal] : not suicidal [Depression] : no depression

## 2019-11-18 ENCOUNTER — APPOINTMENT (OUTPATIENT)
Dept: NEUROLOGY | Facility: CLINIC | Age: 74
End: 2019-11-18
Payer: MEDICARE

## 2019-11-18 VITALS
DIASTOLIC BLOOD PRESSURE: 75 MMHG | HEIGHT: 72 IN | WEIGHT: 180 LBS | SYSTOLIC BLOOD PRESSURE: 141 MMHG | BODY MASS INDEX: 24.38 KG/M2 | HEART RATE: 80 BPM

## 2019-11-18 DIAGNOSIS — I63.411 CEREBRAL INFARCTION DUE TO EMBOLISM OF RIGHT MIDDLE CEREBRAL ARTERY: ICD-10-CM

## 2019-11-18 DIAGNOSIS — G62.9 POLYNEUROPATHY, UNSPECIFIED: ICD-10-CM

## 2019-11-18 PROCEDURE — 99214 OFFICE O/P EST MOD 30 MIN: CPT

## 2019-11-19 PROBLEM — I63.411 CEREBROVASCULAR ACCIDENT (CVA) DUE TO EMBOLISM OF RIGHT MIDDLE CEREBRAL ARTERY: Status: ACTIVE | Noted: 2018-08-15

## 2019-11-19 NOTE — DISCUSSION/SUMMARY
[FreeTextEntry1] : Opinion\par \par The patient's current symptoms indicate lumbar osteoarthropathy perhaps due to secondary degenerative process in the joints of the lumbar spine but there is no evidence of radiculopathy and was advised gentle stretching exercises and alternatives were discussed including epidural injections which have failed in the past and there is no surgical indication at this time.\par \par The patient has multiple medical and other prior neurologically she was and they are all stable and was advised to continue with his cardiologist, stroke specialist and good general health maintenance and to call me if there is any change in his symptoms. He will get him back for followup in approximately 6 months. Fall precautions were discussed.

## 2019-11-19 NOTE — PHYSICAL EXAM
[FreeTextEntry1] : General examination-vital signs are recorded and unremarkable. There is no carotid bruit, thyromegaly or lymphadenopathy. Chest is clear heart sounds are normal. Pedal pulsations are present and there is no leg edema. Straight leg raising test is negative with minimal discomfort in the thigh\par \par Neurological evaluation reveals normal mental functions other than minimal underlying anxiety. Cranial nerve examination is unremarkable. The only positive findings is minimal right leg proximal weakness of +4 to -5/5 and his ankle reflexes are absent and there is minimal spasticity in the legs as a result of prior myelopathy. Rest of his examination is completely normal with normal strength coordination sensations and reflexes. There is decreased vibration in the feet because of prior mild sensory neuropathy. Spine is without tenderness and there is no muscle spasm. He walks with a walker.

## 2019-11-19 NOTE — DATA REVIEWED
[de-identified] : I reviewed the consultation report by Dr. Hunt and there is no neurological pertinence.

## 2019-11-19 NOTE — HISTORY OF PRESENT ILLNESS
[FreeTextEntry1] : Mr. Clemens is a 74-year-old  male seeking neurological followup for chronic spinal issues. He was recently evaluated by  for prior history of TIA/stroke and has been on a course of anticoagulants and being monitored by his physician and takes a elaquis 5 mg daily twice a day that is available of bleeding complications.\par \par The patient's current complaints are that of right lumbar pain occurring at least once a day lasting for a few minutes and occurs mostly with change of position and predominantly in the right paralumbar area with reference to right proximal thigh without any radicular symptoms of new tingling numbness or weakness and with correction of his position or posture it subsides but can accentuate sometimes from 5-9/10. There are no other new neurological symptoms.\par \par His current medications include Lipitor but denies and cardiac medications including antianxiety drugs and is currently on gabapentin 300 milligram t.i.d. and I renewed the prescription. He knows the side effects but had none so followup.\par \par Review of systems is unremarkable and I reviewed all his medications and allergies. He was evaluated by Dr. Hunt 2 months ago and was advised to continue conservative approach and was also seen by Dr. Paulson given her continued medications for nontraumatic arthritis.

## 2019-11-19 NOTE — REVIEW OF SYSTEMS
[Anxiety] : anxiety [Leg Weakness] : leg weakness [Numbness] : numbness [Difficulty Walking] : difficulty walking [Tingling] : tingling [As Noted in HPI] : as noted in HPI [As noted in HPI] : as noted in HPI [Negative] : Endocrine

## 2019-11-20 ENCOUNTER — APPOINTMENT (OUTPATIENT)
Dept: ORTHOPEDIC SURGERY | Facility: CLINIC | Age: 74
End: 2019-11-20

## 2019-12-02 ENCOUNTER — APPOINTMENT (OUTPATIENT)
Dept: ORTHOPEDIC SURGERY | Facility: CLINIC | Age: 74
End: 2019-12-02

## 2019-12-23 ENCOUNTER — CHART COPY (OUTPATIENT)
Age: 74
End: 2019-12-23

## 2020-02-07 ENCOUNTER — APPOINTMENT (OUTPATIENT)
Dept: NEUROLOGY | Facility: CLINIC | Age: 75
End: 2020-02-07

## 2020-05-01 ENCOUNTER — APPOINTMENT (OUTPATIENT)
Dept: NEUROLOGY | Facility: CLINIC | Age: 75
End: 2020-05-01

## 2020-06-10 ENCOUNTER — APPOINTMENT (OUTPATIENT)
Dept: NEUROLOGY | Facility: CLINIC | Age: 75
End: 2020-06-10

## 2020-06-22 ENCOUNTER — APPOINTMENT (OUTPATIENT)
Dept: NEUROLOGY | Facility: CLINIC | Age: 75
End: 2020-06-22
Payer: MEDICARE

## 2020-06-22 VITALS
BODY MASS INDEX: 24.38 KG/M2 | DIASTOLIC BLOOD PRESSURE: 55 MMHG | WEIGHT: 180 LBS | SYSTOLIC BLOOD PRESSURE: 153 MMHG | HEART RATE: 72 BPM | HEIGHT: 72 IN

## 2020-06-22 VITALS — TEMPERATURE: 97 F

## 2020-06-22 DIAGNOSIS — M41.9 SCOLIOSIS, UNSPECIFIED: ICD-10-CM

## 2020-06-22 DIAGNOSIS — R26.9 UNSPECIFIED ABNORMALITIES OF GAIT AND MOBILITY: ICD-10-CM

## 2020-06-22 PROCEDURE — 99214 OFFICE O/P EST MOD 30 MIN: CPT

## 2020-06-23 NOTE — PHYSICAL EXAM
[FreeTextEntry1] : General examination is unremarkable.  He walks with a walker.  There is no carotid bruit, thyromegaly or lymphadenopathy.  Chest is clear and heart sounds are normal.  Pedal pulsations are normal and there is no leg edema.  He has significant scoliosis.\par \par Neurologically there is mild anxiety but no evidence of depression or cognitive loss and has excellent memory and language capacity.  Optic disks are normal and visual fields are full with full extraocular movements and there is no nystagmus.  There is no facial weakness and bulbar functions are intact.  Upper extremity motor strength is symmetric and normal with symmetric reflexes and normal sensation whereas in the lower extremity he has significant weakness of the iliopsoas muscle 3-4/5 and hip girdle muscles are -5/5 whereas the entire lower extremity strength is +4/5.  His knee reflexes are trace and ankles are absent.  There is no Babinski sign and plantars are mostly mute.  There is no discernible distal sensory loss and posterior column sensations are preserved.  He walks with a walker and has no spine tenderness.  Romberg sign is negative.

## 2020-06-23 NOTE — DISCUSSION/SUMMARY
[FreeTextEntry1] : Opinion–the patient has history of severe osteoarthritic disease with cervical and lumbosacral radiculopathy and in the past had surgical procedures with arthrodesis including cervical and lumbar spine surgery and has been stable in the last 6 to 8 months with significant scoliosis and there has not been any dramatic change in his neurological symptoms or signs and was advised to continue physical therapy and medications and if there is any sudden change to contact me and to follow-up with Dr. Hunt as majority of his problem is orthopedic in nature.  Education and counseling was provided and return back for follow-up in 6 months to a year or on a as needed basis if there is any sudden change.

## 2020-06-23 NOTE — HISTORY OF PRESENT ILLNESS
[FreeTextEntry1] : Mr. Clemens is a 74-year-old  male returning for follow-up evaluations since approximately 8 months after his last evaluation and has been somewhat distressed as his wife was recently diagnosed as suffering from Alzheimer's disease with psychological changes and has been under the care of Dr. Haroldo Scanlon and was advised to continue under his care as he is a very good neurologist.\par \par His present neurological status is stable and has significant scoliosis had spinal surgery of the cervical and lumbar spine with arthrodesis with history of arachnoiditis and continues to have mild to moderate pain and walks with a walker and in the last 6 to 8 months he has not changed and has been under the care of Dr. Wang Hunt.  His pain is also under reasonably good control as a combination of severe spinal disease and scoliosis but denied any sensory dysfunction and has mild dysuria attributed to prostatic hypertrophy without any urgency or incontinence and has been receiving physical therapy which stabilizes him.  He lives in a house and his current medications include Rapaflo, Lipitor, gabapentin 300 mg 3 times daily with good results and no side effects and is on Eliquis for atrial fibrillation and history of stroke and Xanax for sleep and Percocet on a as needed basis for significant pain.  There is no other interim past medical history.  I reviewed his medications and allergies.

## 2020-06-29 ENCOUNTER — APPOINTMENT (OUTPATIENT)
Dept: ORTHOPEDIC SURGERY | Facility: CLINIC | Age: 75
End: 2020-06-29
Payer: MEDICARE

## 2020-06-29 VITALS — HEIGHT: 72 IN | WEIGHT: 180 LBS | TEMPERATURE: 97.7 F | BODY MASS INDEX: 24.38 KG/M2

## 2020-06-29 PROCEDURE — 99214 OFFICE O/P EST MOD 30 MIN: CPT

## 2020-07-17 ENCOUNTER — APPOINTMENT (OUTPATIENT)
Dept: NEUROLOGY | Facility: CLINIC | Age: 75
End: 2020-07-17
Payer: MEDICARE

## 2020-07-17 ENCOUNTER — APPOINTMENT (OUTPATIENT)
Dept: NEUROLOGY | Facility: CLINIC | Age: 75
End: 2020-07-17

## 2020-07-17 VITALS
BODY MASS INDEX: 24.38 KG/M2 | HEIGHT: 72 IN | HEART RATE: 62 BPM | DIASTOLIC BLOOD PRESSURE: 61 MMHG | WEIGHT: 180 LBS | SYSTOLIC BLOOD PRESSURE: 120 MMHG

## 2020-07-17 VITALS — TEMPERATURE: 97.6 F

## 2020-07-17 PROCEDURE — 99214 OFFICE O/P EST MOD 30 MIN: CPT

## 2020-07-17 PROCEDURE — 93880 EXTRACRANIAL BILAT STUDY: CPT

## 2020-07-17 PROCEDURE — 93888 INTRACRANIAL LIMITED STUDY: CPT

## 2020-10-21 RX ORDER — GABAPENTIN 300 MG/1
300 CAPSULE ORAL 3 TIMES DAILY
Qty: 270 | Refills: 2 | Status: ACTIVE | COMMUNITY
Start: 2020-10-21 | End: 1900-01-01

## 2020-10-26 ENCOUNTER — APPOINTMENT (OUTPATIENT)
Dept: ORTHOPEDIC SURGERY | Facility: CLINIC | Age: 75
End: 2020-10-26
Payer: MEDICARE

## 2020-10-26 VITALS — DIASTOLIC BLOOD PRESSURE: 66 MMHG | SYSTOLIC BLOOD PRESSURE: 114 MMHG | HEART RATE: 88 BPM | TEMPERATURE: 97.4 F

## 2020-10-26 PROCEDURE — 99213 OFFICE O/P EST LOW 20 MIN: CPT

## 2020-10-26 RX ORDER — FUROSEMIDE 40 MG/1
40 TABLET ORAL DAILY
Refills: 0 | Status: DISCONTINUED | COMMUNITY
End: 2020-10-26

## 2020-10-26 RX ORDER — CARBOXYMETHYLCELLULOSE SODIUM 10 MG/ML
SOLUTION/ DROPS OPHTHALMIC
Refills: 0 | Status: DISCONTINUED | COMMUNITY
End: 2020-10-26

## 2020-10-26 RX ORDER — GABAPENTIN 300 MG/1
300 CAPSULE ORAL 3 TIMES DAILY
Qty: 270 | Refills: 2 | Status: DISCONTINUED | COMMUNITY
Start: 2019-11-18 | End: 2020-10-26

## 2020-10-26 RX ORDER — HYDROCORTISONE 1 G/100G
GEL TOPICAL
Refills: 0 | Status: DISCONTINUED | COMMUNITY
End: 2020-10-26

## 2020-10-26 RX ORDER — GABAPENTIN 300 MG/1
300 CAPSULE ORAL 3 TIMES DAILY
Qty: 90 | Refills: 2 | Status: DISCONTINUED | COMMUNITY
Start: 2019-11-18 | End: 2020-10-26

## 2020-10-26 RX ORDER — GABAPENTIN 300 MG/1
300 CAPSULE ORAL 3 TIMES DAILY
Qty: 90 | Refills: 3 | Status: DISCONTINUED | COMMUNITY
Start: 2020-10-19 | End: 2020-10-26

## 2020-10-26 RX ORDER — FUROSEMIDE 20 MG/1
20 TABLET ORAL
Qty: 90 | Refills: 0 | Status: DISCONTINUED | COMMUNITY
Start: 2020-05-15 | End: 2020-10-26

## 2020-10-26 RX ORDER — GABAPENTIN 300 MG/1
300 CAPSULE ORAL 3 TIMES DAILY
Qty: 30 | Refills: 0 | Status: DISCONTINUED | COMMUNITY
Start: 2019-06-20 | End: 2020-10-26

## 2020-10-26 RX ORDER — POTASSIUM CHLORIDE 750 MG/1
10 TABLET, FILM COATED, EXTENDED RELEASE ORAL DAILY
Refills: 0 | Status: DISCONTINUED | COMMUNITY
End: 2020-10-26

## 2020-10-26 RX ORDER — GABAPENTIN 300 MG/1
300 CAPSULE ORAL 3 TIMES DAILY
Qty: 270 | Refills: 1 | Status: DISCONTINUED | COMMUNITY
Start: 2019-06-20 | End: 2020-10-26

## 2020-10-26 RX ORDER — GABAPENTIN 300 MG/1
300 CAPSULE ORAL 3 TIMES DAILY
Qty: 270 | Refills: 2 | Status: DISCONTINUED | COMMUNITY
Start: 2020-01-22 | End: 2020-10-26

## 2020-10-26 RX ORDER — ATORVASTATIN CALCIUM 80 MG/1
TABLET, FILM COATED ORAL
Refills: 0 | Status: ACTIVE | COMMUNITY

## 2020-10-26 RX ORDER — APIXABAN 5 MG/1
5 TABLET, FILM COATED ORAL
Qty: 180 | Refills: 3 | Status: DISCONTINUED | COMMUNITY
End: 2020-10-26

## 2021-04-22 ENCOUNTER — NON-APPOINTMENT (OUTPATIENT)
Age: 76
End: 2021-04-22

## 2021-04-26 ENCOUNTER — NON-APPOINTMENT (OUTPATIENT)
Age: 76
End: 2021-04-26

## 2021-04-26 RX ORDER — METHYLPREDNISOLONE 4 MG/1
4 TABLET ORAL
Qty: 1 | Refills: 0 | Status: DISCONTINUED | COMMUNITY
Start: 2020-05-11 | End: 2021-04-26

## 2021-04-30 NOTE — BRIEF OPERATIVE NOTE - ASSISTANT(S)
Will start patient on famotidine at this time as part of mild COVID pathway  On omeprazole at home SANDRINE Reyes

## 2021-07-14 ENCOUNTER — APPOINTMENT (OUTPATIENT)
Dept: ORTHOPEDIC SURGERY | Facility: CLINIC | Age: 76
End: 2021-07-14
Payer: MEDICARE

## 2021-07-14 VITALS — HEART RATE: 50 BPM | SYSTOLIC BLOOD PRESSURE: 136 MMHG | DIASTOLIC BLOOD PRESSURE: 73 MMHG

## 2021-07-14 DIAGNOSIS — M51.36 OTHER INTERVERTEBRAL DISC DEGENERATION, LUMBAR REGION: ICD-10-CM

## 2021-07-14 PROCEDURE — 99213 OFFICE O/P EST LOW 20 MIN: CPT

## 2021-07-14 NOTE — REASON FOR VISIT
[Follow-Up Visit] : a follow-up visit for [Cervical Myelopathy/Myopathy] : cervical myelopathy/myopathy [Back Pain] : back pain [FreeTextEntry2] : low  back 3/10 pain

## 2021-07-14 NOTE — HISTORY OF PRESENT ILLNESS
[de-identified] : Patient returns for follow-up and states that he was physical therapy and [place] 6 months ago had an exacerbation of symptoms with right lower extremity sciatica which left the leg slightly weakened but it is gradually getting better.  The back is also doing better.  He presents for follow-up. [Improving] : improving [1] : a minimum pain level of 1/10 [3] : a maximum pain level of 3/10

## 2021-07-14 NOTE — PHYSICAL EXAM
[Ataxic] : ataxic [Walker] : ambulates with walker [] : Motor: [Motor Strength Upper Extremities] : left (weak) [NL] : normal and symmetric bilaterally [___/5] : left ([unfilled]/5) [Motor Strength Lower Extremities] : left (5/5) [Knee] : patellar 2+ and symmetric bilaterally [Ankle] : ankle 2+ and symmetric bilaterally [Normal] : Oriented to person, place, and time, insight and judgement were intact and the affect was normal [de-identified] : negative tension sign\par negative LASHONDA

## 2021-07-14 NOTE — DISCUSSION/SUMMARY
[de-identified] : The patient would like to continue physical therapy and I see no problem with this at this time.  Activity modifications were discussed with the patient.  To follow-up with us in 6 months, sooner if symptoms worsen.

## 2021-07-19 ENCOUNTER — NON-APPOINTMENT (OUTPATIENT)
Age: 76
End: 2021-07-19

## 2021-07-19 RX ORDER — DICLOFENAC SODIUM 50 MG/1
50 TABLET, DELAYED RELEASE ORAL
Qty: 40 | Refills: 0 | Status: ACTIVE | COMMUNITY
Start: 2021-07-19 | End: 1900-01-01

## 2021-07-23 ENCOUNTER — APPOINTMENT (OUTPATIENT)
Dept: NEUROLOGY | Facility: CLINIC | Age: 76
End: 2021-07-23
Payer: MEDICARE

## 2021-07-23 ENCOUNTER — APPOINTMENT (OUTPATIENT)
Dept: NEUROLOGY | Facility: CLINIC | Age: 76
End: 2021-07-23

## 2021-07-23 VITALS
HEART RATE: 83 BPM | DIASTOLIC BLOOD PRESSURE: 68 MMHG | WEIGHT: 165 LBS | HEIGHT: 72 IN | SYSTOLIC BLOOD PRESSURE: 126 MMHG | BODY MASS INDEX: 22.35 KG/M2

## 2021-07-23 DIAGNOSIS — M43.17 SPONDYLOLISTHESIS, LUMBOSACRAL REGION: ICD-10-CM

## 2021-07-23 DIAGNOSIS — G03.9 MENINGITIS, UNSPECIFIED: ICD-10-CM

## 2021-07-23 DIAGNOSIS — M54.12 RADICULOPATHY, CERVICAL REGION: ICD-10-CM

## 2021-07-23 DIAGNOSIS — I63.9 CEREBRAL INFARCTION, UNSPECIFIED: ICD-10-CM

## 2021-07-23 PROCEDURE — 99214 OFFICE O/P EST MOD 30 MIN: CPT

## 2021-07-23 NOTE — DISCUSSION/SUMMARY
[FreeTextEntry1] : Mr. Clemens has longstanding disease of cervical thoracic and lumbar spine disease with multiple surgical procedures and has residual effects of cervical radiculomyelopathy, lumbar radiculopathy and unstable gait and walks with a walker but his upper extremity strength is normal and has proximal muscle weakness of the both lower extremities which has remained essentially unchanged and no further surgical procedures have been planned as per Dr. Hunt and I agree with the decision is any further surgery will not likely to improve him.  He was advised activity modification continued home exercise therapy and fall precautions were discussed and good general health maintenance was emphasized.  He should continue following with his spine surgeon and if there is any need to return back for follow-up should there be any change in his neurological symptoms or required by Dr. Hunt I will be very happy to see him again and he understands his option.  He lives in [place] and I can provide telehealth service to him and I educated him with regards to telehealth services.  He understands and will proceed with my advice.  The patient was advised follow-up evaluation on a as needed basis

## 2021-07-23 NOTE — HISTORY OF PRESENT ILLNESS
[FreeTextEntry1] : Mr. Clemens is a 75-year-old  male who was last evaluated approximately 1 year back and has severe spinal disease including acquired spondyloarthropathy of the cervical spine, thoracolumbar spine arachnoiditis severe cervical radiculomyelopathy, lumbar radiculopathy and has been relatively stable since his last evaluation.  He continues to experience gait difficulty and has been walking with a walker with minimal symptomatic worsening over the last 1 year and is continuing physical therapy.  He takes gabapentin 100 mg 3 times daily without side effects which seems to control his pain somewhat.  Today he denied any clear radicular symptoms and there is no bowel or bladder dysfunction.\par \par Interim past medical history reveals that he was given a treatment for his atrial fibrillation by watchman procedure and he described the procedure and that he does not take Eliquis.  This was important as he is prone to fall and should not be on anticoagulants and his cardiologists undertook the procedure and discontinued Eliquis.  There has not been any episodes of atrial fibrillation.\par \par He has also been following with Dr. Hunt and had requested a second opinion whereas additional lumbar surgery was advised but Dr. Hunt was not in favor and therefore the idea was abandoned.\par \par I reviewed his medications allergies and medical records.\par \par I reviewed his past medical records with the patient

## 2021-07-23 NOTE — DATA REVIEWED
[de-identified] : I reviewed the past medical records with the patient and explained all the previous findings.  General examination is unremarkable.

## 2021-07-23 NOTE — REVIEW OF SYSTEMS
[Feeling Poorly] : feeling poorly [Leg Weakness] : leg weakness [Numbness] : numbness [Tingling] : tingling [Difficulty Walking] : difficulty walking [Ataxia] : ataxia [Limping] : limping [As Noted in HPI] : as noted in HPI [Arthralgias] : arthralgias [Joint Pain] : joint pain [Negative] : Heme/Lymph

## 2021-08-06 ENCOUNTER — APPOINTMENT (OUTPATIENT)
Dept: NEUROLOGY | Facility: CLINIC | Age: 76
End: 2021-08-06
Payer: MEDICARE

## 2021-08-06 VITALS
BODY MASS INDEX: 22.35 KG/M2 | HEIGHT: 72 IN | SYSTOLIC BLOOD PRESSURE: 113 MMHG | HEART RATE: 68 BPM | DIASTOLIC BLOOD PRESSURE: 55 MMHG | WEIGHT: 165 LBS

## 2021-08-06 PROCEDURE — 99213 OFFICE O/P EST LOW 20 MIN: CPT

## 2021-08-06 NOTE — PHYSICAL EXAM
[FreeTextEntry1] : He is alert and oriented. No cognitive or communication deficits. Cranial nerves are intact. No facial asymmetry. He has atrophy of the interossei left greater than right but no significant weakness. He has a spastic paraparetic gait and needs assistance of his walker to prevent falling. \par

## 2021-08-06 NOTE — HISTORY OF PRESENT ILLNESS
[FreeTextEntry1] :  75-year-old man last seen 1 year ago with history of a cervical myelopathy. He underwent cervical spine decompressive surgery 21 years ago with an attempt to hold progression. 11 years later underwent L4-5 laminectomy. 10 years ag ohad a permanent pacemaker implanted. 3 years ago noted  weakness of his left index finger and despite the NIHSS of zero he received t-PA. Stroke team at that time felt he had a cardioembolic event affecting left hand dexterity. A CT scan of the head at the time of the stroke and subsequent scans showed no evidence of stroke. His left index finger strength returned to full strength while on stroke unit. I did not feel he had a stroke. The finger weakness occurred after prolonged sitting in our office while waiting for his wife to be examined. \par \par Since last seen he had the watchman procedure and stopped Eliquis.  He has had no new neurologic events.  Continues to use a rolling walker with brakes.  He recently saw  Dr. Beckford whose  examination in the electronic health record reviewed.\par \par

## 2021-08-06 NOTE — HISTORY OF PRESENT ILLNESS
[FreeTextEntry1] :  74-year-old man with history of a cervical myelopathy underwent cervical spine decompressive surgery 20 years ago with an attempt to hold progression. 10 years later underwent L4-5 laminectomy. 9 years agohad a permanent pacemaker implanted. 2 years ago noted  weakness of his left index finger and despite the NIH SS of zero he received t-PA. Stroke team at that time felt he had a cardioembolic event affecting left hand dexterity. A CT scan of the head at the time of the stroke and subsequent scans showed no evidence of stroke. \par \par He is on Eliquis and walks with the aid of a rolling walker with brakes and seat.\par \par Today he had  Doppler studies of the neck which on preliminary report showed no significant abnormalities.

## 2021-08-06 NOTE — ASSESSMENT
[FreeTextEntry1] : I doubt he had a stroke. His main problem is his cervical myelopathy. I suspect his transient left hand weakness was positional entrapment that occurred while he was seated for a prolonged time (waiting for his wife who was seeing another neurologist at our office) and when he got up to go to the bathroom noted weakness of the left index finger when he tried to zip up his fly.

## 2021-08-06 NOTE — REVIEW OF SYSTEMS
[As noted in HPI] : as noted in HPI [As Noted in HPI] : as noted in HPI [Arthralgias] : arthralgias [Negative] : Heme/Lymph

## 2021-08-06 NOTE — PHYSICAL EXAM
[FreeTextEntry1] : He is alert and oriented. No cognitive or communication deficits. Cranial nerves are intact. No facial asymmetry. He has atrophy of the interossei left greater than right but no significant weakness. He has a spastic paraparetic gait and needs assistance of his walker to prevent falling.

## 2021-10-15 ENCOUNTER — APPOINTMENT (OUTPATIENT)
Dept: NEUROLOGY | Facility: CLINIC | Age: 76
End: 2021-10-15

## 2021-10-15 RX ORDER — GABAPENTIN 300 MG/1
300 CAPSULE ORAL 3 TIMES DAILY
Qty: 90 | Refills: 5 | Status: ACTIVE | COMMUNITY
Start: 2021-10-15 | End: 1900-01-01

## 2021-11-16 ENCOUNTER — RX RENEWAL (OUTPATIENT)
Age: 76
End: 2021-11-16

## 2021-11-16 RX ORDER — GABAPENTIN 300 MG/1
300 CAPSULE ORAL 3 TIMES DAILY
Qty: 270 | Refills: 1 | Status: ACTIVE | COMMUNITY
Start: 2021-10-19 | End: 1900-01-01

## 2022-10-03 ENCOUNTER — APPOINTMENT (OUTPATIENT)
Dept: ORTHOPEDIC SURGERY | Facility: CLINIC | Age: 77
End: 2022-10-03

## 2022-10-03 ENCOUNTER — NON-APPOINTMENT (OUTPATIENT)
Age: 77
End: 2022-10-03

## 2022-10-03 PROCEDURE — 99211 OFF/OP EST MAY X REQ PHY/QHP: CPT | Mod: 95

## 2023-04-14 ENCOUNTER — APPOINTMENT (RX ONLY)
Dept: URBAN - METROPOLITAN AREA CLINIC 100 | Facility: CLINIC | Age: 78
Setting detail: DERMATOLOGY
End: 2023-04-14

## 2023-04-14 DIAGNOSIS — L57.8 OTHER SKIN CHANGES DUE TO CHRONIC EXPOSURE TO NONIONIZING RADIATION: ICD-10-CM

## 2023-04-14 DIAGNOSIS — Z85.828 PERSONAL HISTORY OF OTHER MALIGNANT NEOPLASM OF SKIN: ICD-10-CM

## 2023-04-14 DIAGNOSIS — L82.1 OTHER SEBORRHEIC KERATOSIS: ICD-10-CM

## 2023-04-14 DIAGNOSIS — D18.0 HEMANGIOMA: ICD-10-CM

## 2023-04-14 DIAGNOSIS — L57.0 ACTINIC KERATOSIS: ICD-10-CM

## 2023-04-14 DIAGNOSIS — L81.4 OTHER MELANIN HYPERPIGMENTATION: ICD-10-CM

## 2023-04-14 PROBLEM — D18.01 HEMANGIOMA OF SKIN AND SUBCUTANEOUS TISSUE: Status: ACTIVE | Noted: 2023-04-14

## 2023-04-14 PROCEDURE — 17003 DESTRUCT PREMALG LES 2-14: CPT

## 2023-04-14 PROCEDURE — ? SUNSCREEN RECOMMENDATIONS

## 2023-04-14 PROCEDURE — 17000 DESTRUCT PREMALG LESION: CPT

## 2023-04-14 PROCEDURE — 99203 OFFICE O/P NEW LOW 30 MIN: CPT | Mod: 25

## 2023-04-14 PROCEDURE — ? LIQUID NITROGEN

## 2023-04-14 PROCEDURE — ? COUNSELING

## 2023-04-14 ASSESSMENT — LOCATION SIMPLE DESCRIPTION DERM
LOCATION SIMPLE: SCALP
LOCATION SIMPLE: LEFT FOREARM
LOCATION SIMPLE: RIGHT SCALP
LOCATION SIMPLE: LEFT UPPER BACK
LOCATION SIMPLE: ABDOMEN
LOCATION SIMPLE: RIGHT UPPER BACK
LOCATION SIMPLE: LEFT PRETIBIAL REGION
LOCATION SIMPLE: POSTERIOR SCALP
LOCATION SIMPLE: LEFT SCALP
LOCATION SIMPLE: LEFT ANTERIOR NECK
LOCATION SIMPLE: RIGHT FOREHEAD
LOCATION SIMPLE: RIGHT THIGH
LOCATION SIMPLE: CHEST

## 2023-04-14 ASSESSMENT — LOCATION DETAILED DESCRIPTION DERM
LOCATION DETAILED: RIGHT MID-UPPER BACK
LOCATION DETAILED: LEFT SUPERIOR UPPER BACK
LOCATION DETAILED: LEFT CLAVICULAR NECK
LOCATION DETAILED: LEFT MEDIAL FRONTAL SCALP
LOCATION DETAILED: RIGHT SUPERIOR PARIETAL SCALP
LOCATION DETAILED: RIGHT MEDIAL SUPERIOR CHEST
LOCATION DETAILED: LEFT SUPERIOR PARIETAL SCALP
LOCATION DETAILED: LEFT PROXIMAL PRETIBIAL REGION
LOCATION DETAILED: LEFT MEDIAL INFERIOR CHEST
LOCATION DETAILED: LEFT INFERIOR FRONTAL SCALP
LOCATION DETAILED: RIGHT MEDIAL FRONTAL SCALP
LOCATION DETAILED: MID-OCCIPITAL SCALP
LOCATION DETAILED: LEFT CENTRAL FRONTAL SCALP
LOCATION DETAILED: RIGHT SUPERIOR UPPER BACK
LOCATION DETAILED: LEFT PROXIMAL DORSAL FOREARM
LOCATION DETAILED: LEFT DISTAL PRETIBIAL REGION
LOCATION DETAILED: LEFT CENTRAL PARIETAL SCALP
LOCATION DETAILED: RIGHT ANTERIOR DISTAL THIGH
LOCATION DETAILED: RIGHT SUPERIOR FOREHEAD
LOCATION DETAILED: RIGHT MEDIAL INFERIOR CHEST
LOCATION DETAILED: EPIGASTRIC SKIN

## 2023-04-14 ASSESSMENT — LOCATION ZONE DERM
LOCATION ZONE: NECK
LOCATION ZONE: LEG
LOCATION ZONE: ARM
LOCATION ZONE: TRUNK
LOCATION ZONE: SCALP
LOCATION ZONE: FACE

## 2023-04-14 NOTE — PROCEDURE: SUNSCREEN RECOMMENDATIONS
Products Recommended: I also discussed isdin  Eryfotona, and its possible effects on reversing DNA damage. I also suggest a regular sunscreen with Zinc, such as, Elta MD,  for regular use while outside.
Detail Level: Detailed
General Sunscreen Counseling: I recommended a broad spectrum sunscreen with a SPF of 30 or higher. I explained that SPF 30 sunscreens block approximately 97 percent of the sun's harmful rays. Sunscreens should be applied at least 15 minutes prior to expected sun exposure and then every 2 hours after that as long as sun exposure continues. If swimming or exercising sunscreen should be reapplied every 45 minutes to an hour after getting wet or sweating. One ounce, or the equivalent of a shot glass full of sunscreen, is adequate to protect the skin not covered by a bathing suit. I also recommended a lip balm with a sunscreen as well. Sun protective clothing can be used in lieu of sunscreen but must be worn the entire time you are exposed to the sun's rays.

## 2023-04-14 NOTE — PROCEDURE: LIQUID NITROGEN
Render Note In Bullet Format When Appropriate: No
Show Applicator Variable?: Yes
Application Tool (Optional): Liquid Nitrogen Sprayer
Consent: The patient's consent was obtained including but not limited to risks of crusting, scabbing, blistering, scarring, darker or lighter pigmentary change, recurrence, incomplete removal and infection.
Number Of Freeze-Thaw Cycles: 2 freeze-thaw cycles
Detail Level: Simple
Duration Of Freeze Thaw-Cycle (Seconds): 10
Post-Care Instructions: I reviewed with the patient in detail post-care instructions. Patient is to wear sunprotection, and avoid picking at any of the treated lesions. Pt may apply Vaseline to crusted or scabbing areas.

## 2023-09-05 ENCOUNTER — EMERGENCY (EMERGENCY)
Facility: HOSPITAL | Age: 78
LOS: 1 days | Discharge: HOME | End: 2023-09-05
Attending: EMERGENCY MEDICINE | Admitting: EMERGENCY MEDICINE
Payer: MEDICARE

## 2023-09-05 VITALS
RESPIRATION RATE: 18 BRPM | DIASTOLIC BLOOD PRESSURE: 68 MMHG | OXYGEN SATURATION: 96 % | HEART RATE: 64 BPM | TEMPERATURE: 98 F | SYSTOLIC BLOOD PRESSURE: 147 MMHG

## 2023-09-05 VITALS
HEIGHT: 73 IN | TEMPERATURE: 98 F | SYSTOLIC BLOOD PRESSURE: 124 MMHG | OXYGEN SATURATION: 94 % | RESPIRATION RATE: 18 BRPM | WEIGHT: 169.98 LBS | DIASTOLIC BLOOD PRESSURE: 74 MMHG | HEART RATE: 60 BPM

## 2023-09-05 DIAGNOSIS — Z98.890 OTHER SPECIFIED POSTPROCEDURAL STATES: Chronic | ICD-10-CM

## 2023-09-05 DIAGNOSIS — Z98.41 CATARACT EXTRACTION STATUS, RIGHT EYE: Chronic | ICD-10-CM

## 2023-09-05 DIAGNOSIS — Z95.1 PRESENCE OF AORTOCORONARY BYPASS GRAFT: Chronic | ICD-10-CM

## 2023-09-05 DIAGNOSIS — Z98.1 ARTHRODESIS STATUS: Chronic | ICD-10-CM

## 2023-09-05 DIAGNOSIS — Z95.0 PRESENCE OF CARDIAC PACEMAKER: Chronic | ICD-10-CM

## 2023-09-05 LAB
ALBUMIN SERPL ELPH-MCNC: 3.7 G/DL — SIGNIFICANT CHANGE UP (ref 3.3–5)
ALP SERPL-CCNC: 88 U/L — SIGNIFICANT CHANGE UP (ref 40–120)
ALT FLD-CCNC: 18 U/L — SIGNIFICANT CHANGE UP (ref 12–78)
ANION GAP SERPL CALC-SCNC: 4 MMOL/L — LOW (ref 5–17)
APPEARANCE UR: CLEAR — SIGNIFICANT CHANGE UP
APTT BLD: 31.9 SEC — SIGNIFICANT CHANGE UP (ref 24.5–35.6)
AST SERPL-CCNC: 15 U/L — SIGNIFICANT CHANGE UP (ref 15–37)
BASOPHILS # BLD AUTO: 0.03 K/UL — SIGNIFICANT CHANGE UP (ref 0–0.2)
BASOPHILS NFR BLD AUTO: 0.4 % — SIGNIFICANT CHANGE UP (ref 0–2)
BILIRUB SERPL-MCNC: 1 MG/DL — SIGNIFICANT CHANGE UP (ref 0.2–1.2)
BILIRUB UR-MCNC: NEGATIVE — SIGNIFICANT CHANGE UP
BUN SERPL-MCNC: 25 MG/DL — HIGH (ref 7–23)
CALCIUM SERPL-MCNC: 9.2 MG/DL — SIGNIFICANT CHANGE UP (ref 8.5–10.1)
CHLORIDE SERPL-SCNC: 103 MMOL/L — SIGNIFICANT CHANGE UP (ref 96–108)
CO2 SERPL-SCNC: 30 MMOL/L — SIGNIFICANT CHANGE UP (ref 22–31)
COLOR SPEC: YELLOW — SIGNIFICANT CHANGE UP
CREAT SERPL-MCNC: 1.1 MG/DL — SIGNIFICANT CHANGE UP (ref 0.5–1.3)
DIFF PNL FLD: NEGATIVE — SIGNIFICANT CHANGE UP
EGFR: 68 ML/MIN/1.73M2 — SIGNIFICANT CHANGE UP
EOSINOPHIL # BLD AUTO: 0.09 K/UL — SIGNIFICANT CHANGE UP (ref 0–0.5)
EOSINOPHIL NFR BLD AUTO: 1.1 % — SIGNIFICANT CHANGE UP (ref 0–6)
ERYTHROCYTE [SEDIMENTATION RATE] IN BLOOD: 14 MM/HR — SIGNIFICANT CHANGE UP (ref 0–20)
GLUCOSE SERPL-MCNC: 94 MG/DL — SIGNIFICANT CHANGE UP (ref 70–99)
GLUCOSE UR QL: NEGATIVE MG/DL — SIGNIFICANT CHANGE UP
HCT VFR BLD CALC: 41.2 % — SIGNIFICANT CHANGE UP (ref 39–50)
HGB BLD-MCNC: 13.3 G/DL — SIGNIFICANT CHANGE UP (ref 13–17)
IMM GRANULOCYTES NFR BLD AUTO: 0.4 % — SIGNIFICANT CHANGE UP (ref 0–0.9)
INR BLD: 1.04 RATIO — SIGNIFICANT CHANGE UP (ref 0.85–1.18)
KETONES UR-MCNC: NEGATIVE MG/DL — SIGNIFICANT CHANGE UP
LACTATE SERPL-SCNC: 1.5 MMOL/L — SIGNIFICANT CHANGE UP (ref 0.7–2)
LEUKOCYTE ESTERASE UR-ACNC: NEGATIVE — SIGNIFICANT CHANGE UP
LYMPHOCYTES # BLD AUTO: 0.77 K/UL — LOW (ref 1–3.3)
LYMPHOCYTES # BLD AUTO: 9.3 % — LOW (ref 13–44)
MCHC RBC-ENTMCNC: 32.3 GM/DL — SIGNIFICANT CHANGE UP (ref 32–36)
MCHC RBC-ENTMCNC: 33.5 PG — SIGNIFICANT CHANGE UP (ref 27–34)
MCV RBC AUTO: 103.8 FL — HIGH (ref 80–100)
MONOCYTES # BLD AUTO: 0.63 K/UL — SIGNIFICANT CHANGE UP (ref 0–0.9)
MONOCYTES NFR BLD AUTO: 7.6 % — SIGNIFICANT CHANGE UP (ref 2–14)
NEUTROPHILS # BLD AUTO: 6.73 K/UL — SIGNIFICANT CHANGE UP (ref 1.8–7.4)
NEUTROPHILS NFR BLD AUTO: 81.2 % — HIGH (ref 43–77)
NITRITE UR-MCNC: NEGATIVE — SIGNIFICANT CHANGE UP
NRBC # BLD: 0 /100 WBCS — SIGNIFICANT CHANGE UP (ref 0–0)
PH UR: 6 — SIGNIFICANT CHANGE UP (ref 5–8)
PLATELET # BLD AUTO: 159 K/UL — SIGNIFICANT CHANGE UP (ref 150–400)
POTASSIUM SERPL-MCNC: 4.1 MMOL/L — SIGNIFICANT CHANGE UP (ref 3.5–5.3)
POTASSIUM SERPL-SCNC: 4.1 MMOL/L — SIGNIFICANT CHANGE UP (ref 3.5–5.3)
PROT SERPL-MCNC: 7.2 G/DL — SIGNIFICANT CHANGE UP (ref 6–8.3)
PROT UR-MCNC: NEGATIVE MG/DL — SIGNIFICANT CHANGE UP
PROTHROM AB SERPL-ACNC: 12.1 SEC — SIGNIFICANT CHANGE UP (ref 9.5–13)
RBC # BLD: 3.97 M/UL — LOW (ref 4.2–5.8)
RBC # FLD: 13.5 % — SIGNIFICANT CHANGE UP (ref 10.3–14.5)
SARS-COV-2 RNA SPEC QL NAA+PROBE: SIGNIFICANT CHANGE UP
SODIUM SERPL-SCNC: 137 MMOL/L — SIGNIFICANT CHANGE UP (ref 135–145)
SP GR SPEC: 1.02 — SIGNIFICANT CHANGE UP (ref 1–1.03)
UROBILINOGEN FLD QL: 1 MG/DL — SIGNIFICANT CHANGE UP (ref 0.2–1)
WBC # BLD: 8.28 K/UL — SIGNIFICANT CHANGE UP (ref 3.8–10.5)
WBC # FLD AUTO: 8.28 K/UL — SIGNIFICANT CHANGE UP (ref 3.8–10.5)

## 2023-09-05 PROCEDURE — 85610 PROTHROMBIN TIME: CPT

## 2023-09-05 PROCEDURE — 83605 ASSAY OF LACTIC ACID: CPT

## 2023-09-05 PROCEDURE — 73590 X-RAY EXAM OF LOWER LEG: CPT

## 2023-09-05 PROCEDURE — 71045 X-RAY EXAM CHEST 1 VIEW: CPT | Mod: 26

## 2023-09-05 PROCEDURE — 85730 THROMBOPLASTIN TIME PARTIAL: CPT

## 2023-09-05 PROCEDURE — 73590 X-RAY EXAM OF LOWER LEG: CPT | Mod: 26,LT

## 2023-09-05 PROCEDURE — 71045 X-RAY EXAM CHEST 1 VIEW: CPT

## 2023-09-05 PROCEDURE — 85652 RBC SED RATE AUTOMATED: CPT

## 2023-09-05 PROCEDURE — 96374 THER/PROPH/DIAG INJ IV PUSH: CPT

## 2023-09-05 PROCEDURE — 99285 EMERGENCY DEPT VISIT HI MDM: CPT | Mod: 25

## 2023-09-05 PROCEDURE — 36415 COLL VENOUS BLD VENIPUNCTURE: CPT

## 2023-09-05 PROCEDURE — 93005 ELECTROCARDIOGRAM TRACING: CPT

## 2023-09-05 PROCEDURE — 80053 COMPREHEN METABOLIC PANEL: CPT

## 2023-09-05 PROCEDURE — 96375 TX/PRO/DX INJ NEW DRUG ADDON: CPT

## 2023-09-05 PROCEDURE — 85025 COMPLETE CBC W/AUTO DIFF WBC: CPT

## 2023-09-05 PROCEDURE — 93010 ELECTROCARDIOGRAM REPORT: CPT

## 2023-09-05 PROCEDURE — 87040 BLOOD CULTURE FOR BACTERIA: CPT

## 2023-09-05 PROCEDURE — 87635 SARS-COV-2 COVID-19 AMP PRB: CPT

## 2023-09-05 PROCEDURE — 81003 URINALYSIS AUTO W/O SCOPE: CPT

## 2023-09-05 PROCEDURE — 99285 EMERGENCY DEPT VISIT HI MDM: CPT | Mod: FS

## 2023-09-05 RX ORDER — VANCOMYCIN HCL 1 G
1000 VIAL (EA) INTRAVENOUS ONCE
Refills: 0 | Status: COMPLETED | OUTPATIENT
Start: 2023-09-05 | End: 2023-09-05

## 2023-09-05 RX ORDER — PIPERACILLIN AND TAZOBACTAM 4; .5 G/20ML; G/20ML
3.38 INJECTION, POWDER, LYOPHILIZED, FOR SOLUTION INTRAVENOUS ONCE
Refills: 0 | Status: DISCONTINUED | OUTPATIENT
Start: 2023-09-05 | End: 2023-09-09

## 2023-09-05 RX ORDER — BACILLUS COAGULANS/INULIN 1B-250 MG
1 CAPSULE ORAL
Qty: 15 | Refills: 0
Start: 2023-09-05 | End: 2023-09-19

## 2023-09-05 RX ORDER — VANCOMYCIN HCL 1 G
VIAL (EA) INTRAVENOUS
Refills: 0 | Status: DISCONTINUED | OUTPATIENT
Start: 2023-09-05 | End: 2023-09-05

## 2023-09-05 RX ORDER — PIPERACILLIN AND TAZOBACTAM 4; .5 G/20ML; G/20ML
3.38 INJECTION, POWDER, LYOPHILIZED, FOR SOLUTION INTRAVENOUS ONCE
Refills: 0 | Status: COMPLETED | OUTPATIENT
Start: 2023-09-05 | End: 2023-09-05

## 2023-09-05 RX ADMIN — Medication 250 MILLIGRAM(S): at 15:43

## 2023-09-05 RX ADMIN — PIPERACILLIN AND TAZOBACTAM 200 GRAM(S): 4; .5 INJECTION, POWDER, LYOPHILIZED, FOR SOLUTION INTRAVENOUS at 15:19

## 2023-09-05 NOTE — ED PROVIDER NOTE - SKIN, MLM
Skin normal color for race, warm, dry + healing scabs left le. mild erythema anterior shin no discharge

## 2023-09-05 NOTE — ED PROVIDER NOTE - PROGRESS NOTE DETAILS
pt seen by wound care advised if labs wnl can be dc on duricef will reeval labs reviewed will rx abx. advised out pt follow up. All imaging and labs reviewed. all results reviewed with pt including abnormal results. pt given a copy of results. pt advised to follow up with pmd regarding abnormal results. All questions answered and concerns addressed. pt verbalized understanding and agreement with plan and dx. pt advised on next step and when/where to follow up. pt advised on all take home and otc medications. pt advised to follow up with PMD. pt advised to return to ed for worsenng symptoms including fever, cp, sob. will dc.

## 2023-09-05 NOTE — ED PROVIDER NOTE - CARE PROVIDER_API CALL
Jt Monroe  [facility]  [address] [place] [address]  Phone: [phone]  Fax: [phone]  Follow Up Time: 4-6 Days

## 2023-09-05 NOTE — ED ADULT NURSE NOTE - CHIEF COMPLAINT QUOTE
Patient is a 76yo male complaining of left leg swelling after getting a abrasion on the leg 1 week ago Patient was seen at urgent care and  was told to come to [facility] for acute cellulitis work up

## 2023-09-05 NOTE — ED ADULT NURSE NOTE - OBJECTIVE STATEMENT
Patient is a 78yo male complaining of left leg swelling after getting a abrasion on the leg 1 week ago Patient was seen at urgent care and  was told to come to hospital for acute cellulitis.

## 2023-09-05 NOTE — ED PROVIDER NOTE - NSICDXPASTMEDICALHX_GEN_ALL_CORE_FT
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

## 2023-09-05 NOTE — ED PROVIDER NOTE - OBJECTIVE STATEMENT
Patient is a 77-year-old male with past medical history of cervical myelopathy, wheelchair bound, A-fib status post Watchman procedure presents with redness on his left leg.  Patient reports he had a few cuts on his left leg which possibly called cellulitis.  Patient went to urgent care who referred him to ED for evaluation.  Patient reports he did not take anything for symptoms.  Patient denies fever

## 2023-09-05 NOTE — ED PROVIDER NOTE - CHRONIC CONDITION OTHER
cervical myopathy wheelchair-bound chronic lower extremity edema history of A-fib status post Watchman procedure not on anticoagulants due to high risk, GERD, basal cell adenoma, BPH, lumbar stenosis, osteoarthritis

## 2023-09-05 NOTE — ED PROVIDER NOTE - NSICDXPASTSURGICALHX_GEN_ALL_CORE_FT
PAST SURGICAL HISTORY:  Cardiac pacemaker in situ placed 2011    S/P CABG x 3 2011    S/P cataract surgery, right 2015    S/P cervical spinal fusion 2000    S/P lumbar laminectomy     S/P shoulder surgery right arthroscopy

## 2023-09-05 NOTE — ED PROVIDER NOTE - DIFFERENTIAL DIAGNOSIS
Patient presenting to the emergency room with what appears to be a superficial cellulitis.  Will obtain screening labs x-ray imaging medicate with antibiotics trace wound to monitor borders consult with wound care.  Patient can likely be discharged home with outpatient follow-up and p.o. antibiotics.  At this time as there is no increased swelling or calf pain there is low suspicion for PE DVT. Differential Diagnosis

## 2023-09-05 NOTE — ED PROVIDER NOTE - NSICDXFAMILYHX_GEN_ALL_CORE_FT
FAMILY HISTORY:  Family history of liver cancer, colon cancer    Mother  Still living? Unknown  Family history of diabetes mellitus, Age at diagnosis: Age Unknown

## 2023-09-05 NOTE — ED ADULT TRIAGE NOTE - CHIEF COMPLAINT QUOTE
Patient is a 78yo male complaining of left leg swelling after getting a abrasion on the leg 1 week ago Patient was seen at urgent care and  was told to come to [facility] for acute cellulitis work up

## 2023-09-05 NOTE — ED ADULT NURSE NOTE - NSFALLRISKINTERV_ED_ALL_ED

## 2023-09-05 NOTE — ED PROVIDER NOTE - PATIENT PORTAL LINK FT
You can access the FollowMyHealth Patient Portal offered by [facility] by registering at the following website: [email or web address]. By joining [employer]’s FollowMyHealth portal, you will also be able to view your health information using other applications (apps) compatible with our system.

## 2023-09-05 NOTE — ED PROVIDER NOTE - CLINICAL SUMMARY MEDICAL DECISION MAKING FREE TEXT BOX
Patient is a 77-year-old male who presents to the emergency room with a chief complaint of left lower leg pain concern for cellulitis.  Past medical history of cervical myopathy wheelchair-bound chronic lower extremity edema history of A-fib status post Watchman procedure not on anticoagulants due to high risk, GERD, basal cell adenoma, BPH, lumbar stenosis, osteoarthritis.  Patient reports that he has noticed some redness to his left lower shin for about a week.  Does endorse that he had a skin break to the anterior aspect of the shin.  Has discomfort right at the site of the abrasion.  Denies noting any worsening of his chronic lower extremity edema.  Denies any calf pain.  Denies any fevers chills nausea vomiting chest pain or shortness of breath.  On exam patient's lying in bed appears to be a superficial cellulitis to the left anterior shin not crossing the ankle or knee joint.  Mild increased warmth no weeping.  Healing superficial abrasion noted to the shin.  Patient presenting to the emergency room with what appears to be a superficial cellulitis.  Will obtain screening labs x-ray imaging medicate with antibiotics trace wound to monitor borders consult with wound care.  Patient can likely be discharged home with outpatient follow-up and p.o. antibiotics.  At this time as there is no increased swelling or calf pain there is low suspicion for PE DVT.  Independent review of x-rays reveal no acute fracture or evidence of osteomyelitis.

## 2023-09-10 LAB
CULTURE RESULTS: SIGNIFICANT CHANGE UP
CULTURE RESULTS: SIGNIFICANT CHANGE UP
SPECIMEN SOURCE: SIGNIFICANT CHANGE UP
SPECIMEN SOURCE: SIGNIFICANT CHANGE UP

## 2023-09-12 ENCOUNTER — APPOINTMENT (OUTPATIENT)
Dept: WOUND CARE | Facility: HOSPITAL | Age: 78
End: 2023-09-12
Payer: MEDICARE

## 2023-09-12 ENCOUNTER — OUTPATIENT (OUTPATIENT)
Dept: OUTPATIENT SERVICES | Facility: HOSPITAL | Age: 78
LOS: 1 days | Discharge: HOME | End: 2023-09-12
Payer: MEDICARE

## 2023-09-12 VITALS
RESPIRATION RATE: 18 BRPM | DIASTOLIC BLOOD PRESSURE: 87 MMHG | OXYGEN SATURATION: 99 % | SYSTOLIC BLOOD PRESSURE: 147 MMHG | WEIGHT: 165 LBS | BODY MASS INDEX: 22.35 KG/M2 | HEIGHT: 72 IN | HEART RATE: 92 BPM | TEMPERATURE: 97.8 F

## 2023-09-12 DIAGNOSIS — Z95.1 PRESENCE OF AORTOCORONARY BYPASS GRAFT: Chronic | ICD-10-CM

## 2023-09-12 DIAGNOSIS — L97.801 NON-PRESSURE CHRONIC ULCER OF OTHER PART OF UNSPECIFIED LOWER LEG LIMITED TO BREAKDOWN OF SKIN: ICD-10-CM

## 2023-09-12 DIAGNOSIS — Z95.0 PRESENCE OF CARDIAC PACEMAKER: Chronic | ICD-10-CM

## 2023-09-12 DIAGNOSIS — Z98.890 OTHER SPECIFIED POSTPROCEDURAL STATES: Chronic | ICD-10-CM

## 2023-09-12 DIAGNOSIS — Z98.41 CATARACT EXTRACTION STATUS, RIGHT EYE: Chronic | ICD-10-CM

## 2023-09-12 DIAGNOSIS — Z98.1 ARTHRODESIS STATUS: Chronic | ICD-10-CM

## 2023-09-12 PROCEDURE — 99203 OFFICE O/P NEW LOW 30 MIN: CPT

## 2023-09-12 PROCEDURE — G0463: CPT

## 2023-09-12 RX ORDER — CLOPIDOGREL 75 MG/1
TABLET, FILM COATED ORAL
Refills: 0 | Status: DISCONTINUED | COMMUNITY
End: 2023-09-12

## 2023-09-12 RX ORDER — CLONAZEPAM 2 MG/1
TABLET ORAL
Refills: 0 | Status: ACTIVE | COMMUNITY

## 2023-09-12 RX ORDER — CEFADROXIL 500 MG/1
CAPSULE ORAL
Refills: 0 | Status: ACTIVE | COMMUNITY

## 2023-09-12 RX ORDER — MELATONIN 5 MG
5 CAPSULE ORAL
Refills: 0 | Status: ACTIVE | COMMUNITY

## 2023-09-12 RX ORDER — UBIDECARENONE/VIT E ACET 100MG-5
CAPSULE ORAL
Refills: 0 | Status: ACTIVE | COMMUNITY

## 2023-09-12 RX ORDER — PSYLLIUM HUSK 0.4 G
CAPSULE ORAL
Refills: 0 | Status: ACTIVE | COMMUNITY

## 2023-09-13 ENCOUNTER — APPOINTMENT (OUTPATIENT)
Dept: ORTHOPEDIC SURGERY | Facility: CLINIC | Age: 78
End: 2023-09-13

## 2023-09-13 DIAGNOSIS — Z82.61 FAMILY HISTORY OF ARTHRITIS: ICD-10-CM

## 2023-09-13 DIAGNOSIS — G62.9 POLYNEUROPATHY, UNSPECIFIED: ICD-10-CM

## 2023-09-13 DIAGNOSIS — R35.0 FREQUENCY OF MICTURITION: ICD-10-CM

## 2023-09-13 DIAGNOSIS — S81.802D UNSPECIFIED OPEN WOUND, LEFT LOWER LEG, SUBSEQUENT ENCOUNTER: ICD-10-CM

## 2023-09-13 DIAGNOSIS — Z95.0 PRESENCE OF CARDIAC PACEMAKER: ICD-10-CM

## 2023-09-13 DIAGNOSIS — Z79.899 OTHER LONG TERM (CURRENT) DRUG THERAPY: ICD-10-CM

## 2023-09-13 DIAGNOSIS — I48.91 UNSPECIFIED ATRIAL FIBRILLATION: ICD-10-CM
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
## 2023-09-13 DIAGNOSIS — Z86.73 PERSONAL HISTORY OF TRANSIENT ISCHEMIC ATTACK (TIA), AND CEREBRAL INFARCTION WITHOUT RESIDUAL DEFICITS: ICD-10-CM
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
## 2023-09-13 DIAGNOSIS — Z95.1 PRESENCE OF AORTOCORONARY BYPASS GRAFT: ICD-10-CM

## 2023-09-13 DIAGNOSIS — Z83.3 FAMILY HISTORY OF DIABETES MELLITUS: ICD-10-CM

## 2023-09-13 DIAGNOSIS — K21.9 GASTRO-ESOPHAGEAL REFLUX DISEASE WITHOUT ESOPHAGITIS: ICD-10-CM

## 2023-09-13 DIAGNOSIS — Z88.8 ALLERGY STATUS TO OTHER DRUGS, MEDICAMENTS AND BIOLOGICAL SUBSTANCES STATUS: ICD-10-CM

## 2023-09-13 DIAGNOSIS — Z80.0 FAMILY HISTORY OF MALIGNANT NEOPLASM OF DIGESTIVE ORGANS: ICD-10-CM
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
## 2023-09-13 DIAGNOSIS — Z82.49 FAMILY HISTORY OF ISCHEMIC HEART DISEASE AND OTHER DISEASES OF THE CIRCULATORY SYSTEM: ICD-10-CM

## 2023-09-13 DIAGNOSIS — Z98.890 OTHER SPECIFIED POSTPROCEDURAL STATES: ICD-10-CM

## 2023-09-13 DIAGNOSIS — Z98.1 ARTHRODESIS STATUS: ICD-10-CM

## 2023-09-22 ENCOUNTER — OUTPATIENT (OUTPATIENT)
Dept: OUTPATIENT SERVICES | Facility: HOSPITAL | Age: 78
LOS: 1 days | End: 2023-09-22
Payer: MEDICARE

## 2023-09-22 DIAGNOSIS — Z98.890 OTHER SPECIFIED POSTPROCEDURAL STATES: Chronic | ICD-10-CM

## 2023-09-22 DIAGNOSIS — Z98.41 CATARACT EXTRACTION STATUS, RIGHT EYE: Chronic | ICD-10-CM

## 2023-09-22 DIAGNOSIS — Z95.1 PRESENCE OF AORTOCORONARY BYPASS GRAFT: Chronic | ICD-10-CM

## 2023-09-22 DIAGNOSIS — S81.802D UNSPECIFIED OPEN WOUND, LEFT LOWER LEG, SUBSEQUENT ENCOUNTER: ICD-10-CM

## 2023-09-22 DIAGNOSIS — Z95.0 PRESENCE OF CARDIAC PACEMAKER: Chronic | ICD-10-CM

## 2023-09-22 DIAGNOSIS — Z98.1 ARTHRODESIS STATUS: Chronic | ICD-10-CM

## 2023-09-22 PROCEDURE — 93970 EXTREMITY STUDY: CPT

## 2023-09-22 PROCEDURE — 93923 UPR/LXTR ART STDY 3+ LVLS: CPT

## 2023-09-22 PROCEDURE — 93922 UPR/L XTREMITY ART 2 LEVELS: CPT | Mod: 26

## 2023-09-22 PROCEDURE — 93970 EXTREMITY STUDY: CPT | Mod: 26

## 2023-09-26 ENCOUNTER — OUTPATIENT (OUTPATIENT)
Dept: OUTPATIENT SERVICES | Facility: HOSPITAL | Age: 78
LOS: 1 days | Discharge: HOME | End: 2023-09-26
Payer: MEDICARE

## 2023-09-26 ENCOUNTER — APPOINTMENT (OUTPATIENT)
Dept: WOUND CARE | Facility: HOSPITAL | Age: 78
End: 2023-09-26
Payer: MEDICARE

## 2023-09-26 VITALS
SYSTOLIC BLOOD PRESSURE: 142 MMHG | HEIGHT: 72 IN | TEMPERATURE: 97.7 F | BODY MASS INDEX: 22.35 KG/M2 | HEART RATE: 71 BPM | WEIGHT: 165 LBS | OXYGEN SATURATION: 98 % | RESPIRATION RATE: 18 BRPM | DIASTOLIC BLOOD PRESSURE: 98 MMHG

## 2023-09-26 DIAGNOSIS — L97.801 NON-PRESSURE CHRONIC ULCER OF OTHER PART OF UNSPECIFIED LOWER LEG LIMITED TO BREAKDOWN OF SKIN: ICD-10-CM

## 2023-09-26 DIAGNOSIS — Z98.1 ARTHRODESIS STATUS: Chronic | ICD-10-CM

## 2023-09-26 DIAGNOSIS — Z98.890 OTHER SPECIFIED POSTPROCEDURAL STATES: Chronic | ICD-10-CM

## 2023-09-26 DIAGNOSIS — Z98.41 CATARACT EXTRACTION STATUS, RIGHT EYE: Chronic | ICD-10-CM

## 2023-09-26 DIAGNOSIS — Z95.1 PRESENCE OF AORTOCORONARY BYPASS GRAFT: Chronic | ICD-10-CM

## 2023-09-26 DIAGNOSIS — Z95.0 PRESENCE OF CARDIAC PACEMAKER: Chronic | ICD-10-CM

## 2023-09-26 PROCEDURE — G0463: CPT

## 2023-09-26 PROCEDURE — 99213 OFFICE O/P EST LOW 20 MIN: CPT

## 2023-09-27 ENCOUNTER — APPOINTMENT (OUTPATIENT)
Dept: ORTHOPEDIC SURGERY | Facility: CLINIC | Age: 78
End: 2023-09-27
Payer: MEDICARE

## 2023-09-27 VITALS — WEIGHT: 165 LBS | HEIGHT: 72 IN | BODY MASS INDEX: 22.35 KG/M2

## 2023-09-27 PROCEDURE — 20610 DRAIN/INJ JOINT/BURSA W/O US: CPT | Mod: RT

## 2023-09-27 PROCEDURE — 99214 OFFICE O/P EST MOD 30 MIN: CPT | Mod: 25

## 2023-09-27 PROCEDURE — 73564 X-RAY EXAM KNEE 4 OR MORE: CPT | Mod: RT

## 2023-09-28 DIAGNOSIS — Z82.49 FAMILY HISTORY OF ISCHEMIC HEART DISEASE AND OTHER DISEASES OF THE CIRCULATORY SYSTEM: ICD-10-CM

## 2023-09-28 DIAGNOSIS — Z98.890 OTHER SPECIFIED POSTPROCEDURAL STATES: ICD-10-CM

## 2023-09-28 DIAGNOSIS — Z80.0 FAMILY HISTORY OF MALIGNANT NEOPLASM OF DIGESTIVE ORGANS: ICD-10-CM

## 2023-09-28 DIAGNOSIS — K21.9 GASTRO-ESOPHAGEAL REFLUX DISEASE WITHOUT ESOPHAGITIS: ICD-10-CM

## 2023-09-28 DIAGNOSIS — Z82.61 FAMILY HISTORY OF ARTHRITIS: ICD-10-CM
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
## 2023-09-28 DIAGNOSIS — S81.802D UNSPECIFIED OPEN WOUND, LEFT LOWER LEG, SUBSEQUENT ENCOUNTER: ICD-10-CM

## 2023-09-28 DIAGNOSIS — Z86.73 PERSONAL HISTORY OF TRANSIENT ISCHEMIC ATTACK (TIA), AND CEREBRAL INFARCTION WITHOUT RESIDUAL DEFICITS: ICD-10-CM
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
## 2023-09-28 DIAGNOSIS — Z79.899 OTHER LONG TERM (CURRENT) DRUG THERAPY: ICD-10-CM

## 2023-09-28 DIAGNOSIS — G62.9 POLYNEUROPATHY, UNSPECIFIED: ICD-10-CM

## 2023-09-28 DIAGNOSIS — Z95.1 PRESENCE OF AORTOCORONARY BYPASS GRAFT: ICD-10-CM
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
## 2023-09-28 DIAGNOSIS — Z88.8 ALLERGY STATUS TO OTHER DRUGS, MEDICAMENTS AND BIOLOGICAL SUBSTANCES: ICD-10-CM

## 2023-09-28 DIAGNOSIS — R35.0 FREQUENCY OF MICTURITION: ICD-10-CM

## 2023-09-28 DIAGNOSIS — Z95.0 PRESENCE OF CARDIAC PACEMAKER: ICD-10-CM

## 2023-09-28 DIAGNOSIS — I48.91 UNSPECIFIED ATRIAL FIBRILLATION: ICD-10-CM

## 2023-09-28 DIAGNOSIS — Z83.3 FAMILY HISTORY OF DIABETES MELLITUS: ICD-10-CM

## 2023-10-01 ENCOUNTER — NON-APPOINTMENT (OUTPATIENT)
Age: 78
End: 2023-10-01

## 2023-10-02 ENCOUNTER — OUTPATIENT (OUTPATIENT)
Dept: OUTPATIENT SERVICES | Facility: HOSPITAL | Age: 78
LOS: 1 days | Discharge: HOME | End: 2023-10-02
Payer: MEDICARE

## 2023-10-02 ENCOUNTER — APPOINTMENT (OUTPATIENT)
Dept: WOUND CARE | Facility: HOSPITAL | Age: 78
End: 2023-10-02
Payer: MEDICARE

## 2023-10-02 VITALS
BODY MASS INDEX: 22.35 KG/M2 | TEMPERATURE: 98.2 F | SYSTOLIC BLOOD PRESSURE: 133 MMHG | RESPIRATION RATE: 18 BRPM | HEART RATE: 60 BPM | DIASTOLIC BLOOD PRESSURE: 69 MMHG | HEIGHT: 72 IN | WEIGHT: 165 LBS | OXYGEN SATURATION: 95 %

## 2023-10-02 DIAGNOSIS — Z98.41 CATARACT EXTRACTION STATUS, RIGHT EYE: Chronic | ICD-10-CM

## 2023-10-02 DIAGNOSIS — Z98.890 OTHER SPECIFIED POSTPROCEDURAL STATES: Chronic | ICD-10-CM

## 2023-10-02 DIAGNOSIS — Z98.1 ARTHRODESIS STATUS: Chronic | ICD-10-CM

## 2023-10-02 DIAGNOSIS — Z95.0 PRESENCE OF CARDIAC PACEMAKER: Chronic | ICD-10-CM

## 2023-10-02 DIAGNOSIS — Z95.1 PRESENCE OF AORTOCORONARY BYPASS GRAFT: Chronic | ICD-10-CM

## 2023-10-02 DIAGNOSIS — S81.802D UNSPECIFIED OPEN WOUND, LEFT LOWER LEG, SUBSEQUENT ENCOUNTER: ICD-10-CM

## 2023-10-02 DIAGNOSIS — S91.301D UNSPECIFIED OPEN WOUND, RIGHT FOOT, SUBSEQUENT ENCOUNTER: ICD-10-CM

## 2023-10-02 DIAGNOSIS — L97.801 NON-PRESSURE CHRONIC ULCER OF OTHER PART OF UNSPECIFIED LOWER LEG LIMITED TO BREAKDOWN OF SKIN: ICD-10-CM

## 2023-10-02 PROCEDURE — G0463: CPT

## 2023-10-02 PROCEDURE — 99202 OFFICE O/P NEW SF 15 MIN: CPT
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
## 2023-10-09 DIAGNOSIS — S91.301D UNSPECIFIED OPEN WOUND, RIGHT FOOT, SUBSEQUENT ENCOUNTER: ICD-10-CM

## 2023-10-09 DIAGNOSIS — Z82.61 FAMILY HISTORY OF ARTHRITIS: ICD-10-CM

## 2023-10-09 DIAGNOSIS — Z86.73 PERSONAL HISTORY OF TRANSIENT ISCHEMIC ATTACK (TIA), AND CEREBRAL INFARCTION WITHOUT RESIDUAL DEFICITS: ICD-10-CM

## 2023-10-09 DIAGNOSIS — G62.9 POLYNEUROPATHY, UNSPECIFIED: ICD-10-CM

## 2023-10-09 DIAGNOSIS — Z95.1 PRESENCE OF AORTOCORONARY BYPASS GRAFT: ICD-10-CM

## 2023-10-09 DIAGNOSIS — R35.0 FREQUENCY OF MICTURITION: ICD-10-CM

## 2023-10-09 DIAGNOSIS — Z88.8 ALLERGY STATUS TO OTHER DRUGS, MEDICAMENTS AND BIOLOGICAL SUBSTANCES: ICD-10-CM

## 2023-10-09 DIAGNOSIS — I48.91 UNSPECIFIED ATRIAL FIBRILLATION: ICD-10-CM

## 2023-10-09 DIAGNOSIS — Z98.890 OTHER SPECIFIED POSTPROCEDURAL STATES: ICD-10-CM
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
## 2023-10-09 DIAGNOSIS — K21.9 GASTRO-ESOPHAGEAL REFLUX DISEASE WITHOUT ESOPHAGITIS: ICD-10-CM

## 2023-10-09 DIAGNOSIS — Z83.3 FAMILY HISTORY OF DIABETES MELLITUS: ICD-10-CM

## 2023-10-09 DIAGNOSIS — Z95.0 PRESENCE OF CARDIAC PACEMAKER: ICD-10-CM
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
## 2023-10-09 DIAGNOSIS — Z82.49 FAMILY HISTORY OF ISCHEMIC HEART DISEASE AND OTHER DISEASES OF THE CIRCULATORY SYSTEM: ICD-10-CM
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
## 2023-10-09 DIAGNOSIS — Z79.899 OTHER LONG TERM (CURRENT) DRUG THERAPY: ICD-10-CM

## 2023-10-09 DIAGNOSIS — Z80.0 FAMILY HISTORY OF MALIGNANT NEOPLASM OF DIGESTIVE ORGANS: ICD-10-CM

## 2023-10-09 PROBLEM — S81.802D UNSPECIFIED OPEN WOUND, LEFT LOWER LEG, SUBSEQUENT ENCOUNTER: Status: ACTIVE | Noted: 2023-09-12

## 2023-10-18 ENCOUNTER — APPOINTMENT (OUTPATIENT)
Dept: ORTHOPEDIC SURGERY | Facility: CLINIC | Age: 78
End: 2023-10-18
Payer: MEDICARE

## 2023-10-18 VITALS — WEIGHT: 165 LBS | BODY MASS INDEX: 22.35 KG/M2 | HEIGHT: 72 IN

## 2023-10-18 DIAGNOSIS — G95.9 DISEASE OF SPINAL CORD, UNSPECIFIED: ICD-10-CM

## 2023-10-18 DIAGNOSIS — M48.061 SPINAL STENOSIS, LUMBAR REGION WITHOUT NEUROGENIC CLAUDICATION: ICD-10-CM

## 2023-10-18 DIAGNOSIS — M17.11 UNILATERAL PRIMARY OSTEOARTHRITIS, RIGHT KNEE: ICD-10-CM

## 2023-10-18 PROCEDURE — 99214 OFFICE O/P EST MOD 30 MIN: CPT

## 2024-02-03 NOTE — PHYSICAL EXAM
[FreeTextEntry1] : General examination is unremarkable.  There is no carotid bruit, thyromegaly or lymphadenopathy.  Neck is supple with restricted range of motion as noted previously and unchanged.  There is no Tinel sign at wrist.  There are no meningeal signs.  Chest is clear and heart sounds are normal.  Pedal pulsations are perceptible.  There is no significant lymphedema.  Lumbar spine range of motion is restricted.  He walks with a walker and there is no spine tenderness or muscle spasm.\par \par Neurologically his memory language cognitive and behavioral functions are normal.  He is somewhat anxious and concerned regarding his wife's illness who has Lewy body dementia and has been under the care of a neurologist.  Cranial nerve examination is unremarkable.  Optic disks are normal visual fields are full pupils are brisk there is no facial weakness hearing and bulbar functions are intact.  Upper extremity motor strength is 5/5 with normal coordination and tone without atrophy fasciculation or abnormal movements however his triceps reflex is brisk bilaterally whereas biceps and brachioradialis are 1+ each and there is no Loren sign.  In the lower extremity his iliopsoas and quadriceps muscles are 3-4/5 including 4/5 strength of the gluteals and hamstrings whereas his plantar flexors inverters everters and dorsiflexors are near normal bilaterally.  Knee reflexes are 2+ symmetric whereas ankle reflexes are absent bilaterally and there is equivocal Babinski sign.  He walks with a walker.  Romberg sign is negative.  Opinion
N/A
supervision

## 2024-02-19 ENCOUNTER — APPOINTMENT (RX ONLY)
Dept: URBAN - METROPOLITAN AREA CLINIC 100 | Facility: CLINIC | Age: 79
Setting detail: DERMATOLOGY
End: 2024-02-19

## 2024-02-19 DIAGNOSIS — L81.4 OTHER MELANIN HYPERPIGMENTATION: ICD-10-CM

## 2024-02-19 DIAGNOSIS — Z85.828 PERSONAL HISTORY OF OTHER MALIGNANT NEOPLASM OF SKIN: ICD-10-CM

## 2024-02-19 DIAGNOSIS — L57.0 ACTINIC KERATOSIS: ICD-10-CM

## 2024-02-19 DIAGNOSIS — L57.8 OTHER SKIN CHANGES DUE TO CHRONIC EXPOSURE TO NONIONIZING RADIATION: ICD-10-CM

## 2024-02-19 DIAGNOSIS — L82.1 OTHER SEBORRHEIC KERATOSIS: ICD-10-CM

## 2024-02-19 PROCEDURE — 99213 OFFICE O/P EST LOW 20 MIN: CPT | Mod: 25

## 2024-02-19 PROCEDURE — 17000 DESTRUCT PREMALG LESION: CPT

## 2024-02-19 PROCEDURE — 17003 DESTRUCT PREMALG LES 2-14: CPT

## 2024-02-19 PROCEDURE — ? DIAGNOSIS COMMENT

## 2024-02-19 PROCEDURE — ? LIQUID NITROGEN

## 2024-02-19 PROCEDURE — ? COUNSELING

## 2024-02-19 PROCEDURE — ? SUNSCREEN RECOMMENDATIONS

## 2024-02-19 ASSESSMENT — LOCATION SIMPLE DESCRIPTION DERM
LOCATION SIMPLE: RIGHT FOREHEAD
LOCATION SIMPLE: LEFT CHEEK
LOCATION SIMPLE: SUPERIOR FOREHEAD
LOCATION SIMPLE: LEFT SCALP
LOCATION SIMPLE: SCALP

## 2024-02-19 ASSESSMENT — LOCATION DETAILED DESCRIPTION DERM
LOCATION DETAILED: SUPERIOR MID FOREHEAD
LOCATION DETAILED: LEFT CENTRAL MALAR CHEEK
LOCATION DETAILED: LEFT SUPERIOR PARIETAL SCALP
LOCATION DETAILED: LEFT MEDIAL FRONTAL SCALP
LOCATION DETAILED: RIGHT SUPERIOR FOREHEAD
LOCATION DETAILED: RIGHT SUPERIOR PARIETAL SCALP

## 2024-02-19 ASSESSMENT — LOCATION ZONE DERM
LOCATION ZONE: SCALP
LOCATION ZONE: FACE

## 2024-02-19 NOTE — PROCEDURE: DIAGNOSIS COMMENT
Comment: Basal cell carcinoma of skin: \\nChest -treated 15+ years\\nRight thigh -treated 15+ years
Detail Level: Simple
Render Risk Assessment In Note?: no

## 2024-02-19 NOTE — PROCEDURE: SUNSCREEN RECOMMENDATIONS
Detail Level: Detailed
General Sunscreen Counseling: I recommended a broad spectrum sunscreen with a SPF of 30 or higher.  I explained that SPF 30 sunscreens block approximately 97 percent of the sun's harmful rays.  Sunscreens should be applied at least 15 minutes prior to expected sun exposure and then every 2 hours after that as long as sun exposure continues. If swimming or exercising sunscreen should be reapplied every 45 minutes to an hour after getting wet or sweating.  One ounce, or the equivalent of a shot glass full of sunscreen, is adequate to protect the skin not covered by a bathing suit. I also recommended a lip balm with a sunscreen as well. Sun protective clothing can be used in lieu of sunscreen but must be worn the entire time you are exposed to the sun's rays.
Products Recommended: I also discussed sdin Eryfotona, and its possible effects on reversing DNA damage.  I also suggest a regular sunscreen with Zinc, such as, Elta MD,  for regular use while outside.

## 2024-02-19 NOTE — PROCEDURE: LIQUID NITROGEN
Render Note In Bullet Format When Appropriate: No
Application Tool (Optional): Liquid Nitrogen Sprayer
Detail Level: Detailed
Post-Care Instructions: I reviewed with the patient in detail post-care instructions. Patient is to wear sunprotection, and avoid picking at any of the treated lesions. Pt may apply Vaseline to crusted or scabbing areas.
Number Of Freeze-Thaw Cycles: 1 freeze-thaw cycle
Show Aperture Variable?: Yes
Duration Of Freeze Thaw-Cycle (Seconds): 0
Consent: The patient's consent was obtained including but not limited to risks of crusting, scabbing, blistering, scarring, darker or lighter pigmentary change, recurrence, incomplete removal and infection.

## 2024-06-23 ENCOUNTER — INPATIENT (INPATIENT)
Facility: HOSPITAL | Age: 79
LOS: 12 days | Discharge: HOME | DRG: 392 | End: 2024-07-06
Attending: STUDENT IN AN ORGANIZED HEALTH CARE EDUCATION/TRAINING PROGRAM | Admitting: INTERNAL MEDICINE
Payer: MEDICARE

## 2024-06-23 VITALS
DIASTOLIC BLOOD PRESSURE: 55 MMHG | SYSTOLIC BLOOD PRESSURE: 131 MMHG | TEMPERATURE: 98 F | HEART RATE: 68 BPM | WEIGHT: 164.91 LBS | RESPIRATION RATE: 18 BRPM | HEIGHT: 73 IN | OXYGEN SATURATION: 96 %

## 2024-06-23 DIAGNOSIS — E78.5 HYPERLIPIDEMIA, UNSPECIFIED: ICD-10-CM

## 2024-06-23 DIAGNOSIS — I48.91 UNSPECIFIED ATRIAL FIBRILLATION: ICD-10-CM

## 2024-06-23 DIAGNOSIS — Z29.9 ENCOUNTER FOR PROPHYLACTIC MEASURES, UNSPECIFIED: ICD-10-CM

## 2024-06-23 DIAGNOSIS — Z95.0 PRESENCE OF CARDIAC PACEMAKER: Chronic | ICD-10-CM

## 2024-06-23 DIAGNOSIS — Z95.1 PRESENCE OF AORTOCORONARY BYPASS GRAFT: Chronic | ICD-10-CM

## 2024-06-23 DIAGNOSIS — Z98.41 CATARACT EXTRACTION STATUS, RIGHT EYE: Chronic | ICD-10-CM

## 2024-06-23 DIAGNOSIS — E83.52 HYPERCALCEMIA: ICD-10-CM

## 2024-06-23 DIAGNOSIS — Z98.890 OTHER SPECIFIED POSTPROCEDURAL STATES: Chronic | ICD-10-CM

## 2024-06-23 DIAGNOSIS — R13.10 DYSPHAGIA, UNSPECIFIED: ICD-10-CM

## 2024-06-23 DIAGNOSIS — Z86.73 PERSONAL HISTORY OF TRANSIENT ISCHEMIC ATTACK (TIA), AND CEREBRAL INFARCTION WITHOUT RESIDUAL DEFICITS: ICD-10-CM

## 2024-06-23 DIAGNOSIS — Z98.1 ARTHRODESIS STATUS: Chronic | ICD-10-CM

## 2024-06-23 LAB
ALBUMIN SERPL ELPH-MCNC: 3.9 G/DL — SIGNIFICANT CHANGE UP (ref 3.3–5)
ALP SERPL-CCNC: 85 U/L — SIGNIFICANT CHANGE UP (ref 40–120)
ALT FLD-CCNC: 19 U/L — SIGNIFICANT CHANGE UP (ref 12–78)
ANION GAP SERPL CALC-SCNC: 7 MMOL/L — SIGNIFICANT CHANGE UP (ref 5–17)
AST SERPL-CCNC: 21 U/L — SIGNIFICANT CHANGE UP (ref 15–37)
BASOPHILS # BLD AUTO: 0.02 K/UL — SIGNIFICANT CHANGE UP (ref 0–0.2)
BASOPHILS NFR BLD AUTO: 0.2 % — SIGNIFICANT CHANGE UP (ref 0–2)
BILIRUB SERPL-MCNC: 1.5 MG/DL — HIGH (ref 0.2–1.2)
BUN SERPL-MCNC: 27 MG/DL — HIGH (ref 7–23)
CALCIUM SERPL-MCNC: 10.2 MG/DL — HIGH (ref 8.5–10.1)
CHLORIDE SERPL-SCNC: 103 MMOL/L — SIGNIFICANT CHANGE UP (ref 96–108)
CO2 SERPL-SCNC: 28 MMOL/L — SIGNIFICANT CHANGE UP (ref 22–31)
CREAT SERPL-MCNC: 1 MG/DL — SIGNIFICANT CHANGE UP (ref 0.5–1.3)
EGFR: 76 ML/MIN/1.73M2 — SIGNIFICANT CHANGE UP
EGFR: 76 ML/MIN/1.73M2 — SIGNIFICANT CHANGE UP
EOSINOPHIL # BLD AUTO: 0.01 K/UL — SIGNIFICANT CHANGE UP (ref 0–0.5)
EOSINOPHIL NFR BLD AUTO: 0.1 % — SIGNIFICANT CHANGE UP (ref 0–6)
GLUCOSE SERPL-MCNC: 90 MG/DL — SIGNIFICANT CHANGE UP (ref 70–99)
HCT VFR BLD CALC: 41.8 % — SIGNIFICANT CHANGE UP (ref 39–50)
HGB BLD-MCNC: 14.4 G/DL — SIGNIFICANT CHANGE UP (ref 13–17)
IMM GRANULOCYTES NFR BLD AUTO: 0.5 % — SIGNIFICANT CHANGE UP (ref 0–0.9)
LIDOCAIN IGE QN: 20 U/L — SIGNIFICANT CHANGE UP (ref 13–75)
LYMPHOCYTES # BLD AUTO: 0.55 K/UL — LOW (ref 1–3.3)
LYMPHOCYTES # BLD AUTO: 6 % — LOW (ref 13–44)
MCHC RBC-ENTMCNC: 34 PG — SIGNIFICANT CHANGE UP (ref 27–34)
MCHC RBC-ENTMCNC: 34.4 GM/DL — SIGNIFICANT CHANGE UP (ref 32–36)
MCV RBC AUTO: 98.8 FL — SIGNIFICANT CHANGE UP (ref 80–100)
MONOCYTES # BLD AUTO: 0.64 K/UL — SIGNIFICANT CHANGE UP (ref 0–0.9)
MONOCYTES NFR BLD AUTO: 7 % — SIGNIFICANT CHANGE UP (ref 2–14)
NEUTROPHILS # BLD AUTO: 7.83 K/UL — HIGH (ref 1.8–7.4)
NEUTROPHILS NFR BLD AUTO: 86.2 % — HIGH (ref 43–77)
NRBC # BLD: 0 /100 WBCS — SIGNIFICANT CHANGE UP (ref 0–0)
NRBC BLD-RTO: 0 /100 WBCS — SIGNIFICANT CHANGE UP (ref 0–0)
PLATELET # BLD AUTO: 176 K/UL — SIGNIFICANT CHANGE UP (ref 150–400)
POTASSIUM SERPL-MCNC: 4.1 MMOL/L — SIGNIFICANT CHANGE UP (ref 3.5–5.3)
POTASSIUM SERPL-SCNC: 4.1 MMOL/L — SIGNIFICANT CHANGE UP (ref 3.5–5.3)
PROT SERPL-MCNC: 7.3 G/DL — SIGNIFICANT CHANGE UP (ref 6–8.3)
RBC # BLD: 4.23 M/UL — SIGNIFICANT CHANGE UP (ref 4.2–5.8)
RBC # FLD: 13.5 % — SIGNIFICANT CHANGE UP (ref 10.3–14.5)
SODIUM SERPL-SCNC: 138 MMOL/L — SIGNIFICANT CHANGE UP (ref 135–145)
WBC # BLD: 9.1 K/UL — SIGNIFICANT CHANGE UP (ref 3.8–10.5)
WBC # FLD AUTO: 9.1 K/UL — SIGNIFICANT CHANGE UP (ref 3.8–10.5)

## 2024-06-23 PROCEDURE — 93010 ELECTROCARDIOGRAM REPORT: CPT

## 2024-06-23 PROCEDURE — 71260 CT THORAX DX C+: CPT | Mod: 26,MC

## 2024-06-23 PROCEDURE — 71045 X-RAY EXAM CHEST 1 VIEW: CPT | Mod: 26

## 2024-06-23 PROCEDURE — 74177 CT ABD & PELVIS W/CONTRAST: CPT | Mod: 26,MC

## 2024-06-23 PROCEDURE — 99223 1ST HOSP IP/OBS HIGH 75: CPT | Mod: GC

## 2024-06-23 PROCEDURE — 99285 EMERGENCY DEPT VISIT HI MDM: CPT | Mod: FS

## 2024-06-23 RX ORDER — GABAPENTIN 400 MG/1
300 CAPSULE ORAL THREE TIMES A DAY
Refills: 0 | Status: DISCONTINUED | OUTPATIENT
Start: 2024-06-23 | End: 2024-07-06

## 2024-06-23 RX ORDER — MELATONIN 5 MG
3 TABLET ORAL AT BEDTIME
Refills: 0 | Status: DISCONTINUED | OUTPATIENT
Start: 2024-06-23 | End: 2024-07-06

## 2024-06-23 RX ORDER — HYDROCORTISONE 1 %
1 OINTMENT (GRAM) TOPICAL
Qty: 0 | Refills: 0 | DISCHARGE

## 2024-06-23 RX ORDER — ONDANSETRON HCL/PF 4 MG/2 ML
4 VIAL (ML) INJECTION EVERY 4 HOURS
Refills: 0 | Status: DISCONTINUED | OUTPATIENT
Start: 2024-06-23 | End: 2024-07-06

## 2024-06-23 RX ORDER — CLONAZEPAM 0.5 MG/1
1.5 TABLET ORAL AT BEDTIME
Refills: 0 | Status: DISCONTINUED | OUTPATIENT
Start: 2024-06-23 | End: 2024-06-30

## 2024-06-23 RX ORDER — ACETAMINOPHEN 500 MG/5ML
650 LIQUID (ML) ORAL EVERY 6 HOURS
Refills: 0 | Status: DISCONTINUED | OUTPATIENT
Start: 2024-06-23 | End: 2024-06-23

## 2024-06-23 RX ORDER — BUDESONIDE 0.25 MG/2ML
2 SUSPENSION RESPIRATORY (INHALATION)
Refills: 0 | DISCHARGE

## 2024-06-23 RX ORDER — ACETAMINOPHEN 500 MG/5ML
650 LIQUID (ML) ORAL EVERY 6 HOURS
Refills: 0 | Status: DISCONTINUED | OUTPATIENT
Start: 2024-06-24 | End: 2024-07-06

## 2024-06-23 RX ORDER — ASPIRIN 325 MG
81 TABLET ORAL DAILY
Refills: 0 | Status: DISCONTINUED | OUTPATIENT
Start: 2024-06-23 | End: 2024-07-06

## 2024-06-23 RX ORDER — ACETAMINOPHEN 500 MG/5ML
1000 LIQUID (ML) ORAL ONCE
Refills: 0 | Status: COMPLETED | OUTPATIENT
Start: 2024-06-23 | End: 2024-06-23

## 2024-06-23 RX ORDER — ATORVASTATIN CALCIUM 80 MG/1
10 TABLET, FILM COATED ORAL AT BEDTIME
Refills: 0 | Status: DISCONTINUED | OUTPATIENT
Start: 2024-06-23 | End: 2024-07-06

## 2024-06-23 RX ORDER — OMEGA-3 ACID ETHYL ESTERS 1 G
0 CAPSULE ORAL
Qty: 0 | Refills: 0 | DISCHARGE

## 2024-06-23 RX ORDER — ALBUTEROL SULFATE 2.5 MG/3ML
2 VIAL, NEBULIZER (ML) INHALATION
Refills: 0 | DISCHARGE

## 2024-06-23 RX ORDER — LEVALBUTEROL HYDROCHLORIDE 1.25 MG/3ML
2 SOLUTION RESPIRATORY (INHALATION)
Refills: 0 | DISCHARGE

## 2024-06-23 RX ORDER — ACETAMINOPHEN 500 MG/5ML
650 LIQUID (ML) ORAL EVERY 6 HOURS
Refills: 0 | Status: DISCONTINUED | OUTPATIENT
Start: 2024-06-23 | End: 2024-07-06

## 2024-06-23 RX ORDER — ONDANSETRON HCL/PF 4 MG/2 ML
4 VIAL (ML) INJECTION ONCE
Refills: 0 | Status: COMPLETED | OUTPATIENT
Start: 2024-06-23 | End: 2024-06-23

## 2024-06-23 RX ORDER — MAGNESIUM, ALUMINUM HYDROXIDE 200-200 MG
30 TABLET,CHEWABLE ORAL EVERY 4 HOURS
Refills: 0 | Status: DISCONTINUED | OUTPATIENT
Start: 2024-06-23 | End: 2024-06-25

## 2024-06-23 RX ORDER — SILODOSIN 4 MG/1
1 CAPSULE ORAL
Qty: 0 | Refills: 0 | DISCHARGE

## 2024-06-23 RX ORDER — MULTIVIT-MIN/FERROUS GLUCONATE 9 MG/15 ML
1 LIQUID (ML) ORAL
Qty: 0 | Refills: 0 | DISCHARGE

## 2024-06-23 RX ADMIN — Medication 40 MILLIGRAM(S): at 18:04

## 2024-06-23 RX ADMIN — Medication 20 MILLIGRAM(S): at 18:04

## 2024-06-23 RX ADMIN — Medication 1000 MILLILITER(S): at 13:52

## 2024-06-23 RX ADMIN — Medication 4 MILLIGRAM(S): at 18:04

## 2024-06-23 RX ADMIN — Medication 400 MILLIGRAM(S): at 18:04

## 2024-06-24 LAB
ANION GAP SERPL CALC-SCNC: 14 MMOL/L — SIGNIFICANT CHANGE UP (ref 5–17)
APPEARANCE UR: CLEAR — SIGNIFICANT CHANGE UP
APTT BLD: 30.4 SEC — SIGNIFICANT CHANGE UP (ref 24.5–35.6)
BILIRUB UR-MCNC: NEGATIVE — SIGNIFICANT CHANGE UP
BUN SERPL-MCNC: 28 MG/DL — HIGH (ref 7–23)
CALCIUM SERPL-MCNC: 8.9 MG/DL — SIGNIFICANT CHANGE UP (ref 8.4–10.5)
CALCIUM SERPL-MCNC: 9.3 MG/DL — SIGNIFICANT CHANGE UP (ref 8.5–10.1)
CHLORIDE SERPL-SCNC: 104 MMOL/L — SIGNIFICANT CHANGE UP (ref 96–108)
CHOLEST SERPL-MCNC: 114 MG/DL — SIGNIFICANT CHANGE UP
CO2 SERPL-SCNC: 21 MMOL/L — LOW (ref 22–31)
COLOR SPEC: SIGNIFICANT CHANGE UP
CREAT SERPL-MCNC: 1.1 MG/DL — SIGNIFICANT CHANGE UP (ref 0.5–1.3)
DIFF PNL FLD: NEGATIVE — SIGNIFICANT CHANGE UP
EGFR: 68 ML/MIN/1.73M2 — SIGNIFICANT CHANGE UP
EGFR: 68 ML/MIN/1.73M2 — SIGNIFICANT CHANGE UP
FLUAV AG NPH QL: SIGNIFICANT CHANGE UP
FLUBV AG NPH QL: SIGNIFICANT CHANGE UP
GLUCOSE SERPL-MCNC: 76 MG/DL — SIGNIFICANT CHANGE UP (ref 70–99)
GLUCOSE UR QL: NEGATIVE — SIGNIFICANT CHANGE UP
HCT VFR BLD CALC: 40.9 % — SIGNIFICANT CHANGE UP (ref 39–50)
HDLC SERPL-MCNC: 43 MG/DL — SIGNIFICANT CHANGE UP
HGB BLD-MCNC: 14 G/DL — SIGNIFICANT CHANGE UP (ref 13–17)
INR BLD: 1.08 RATIO — SIGNIFICANT CHANGE UP (ref 0.85–1.18)
KETONES UR-MCNC: 80
LDLC SERPL-MCNC: 57 MG/DL — SIGNIFICANT CHANGE UP
LEUKOCYTE ESTERASE UR-ACNC: ABNORMAL
LIPID PNL WITH DIRECT LDL SERPL: 57 MG/DL — SIGNIFICANT CHANGE UP
MAGNESIUM SERPL-MCNC: 2 MG/DL — SIGNIFICANT CHANGE UP (ref 1.6–2.6)
MCHC RBC-ENTMCNC: 34 PG — SIGNIFICANT CHANGE UP (ref 27–34)
MCHC RBC-ENTMCNC: 34.2 GM/DL — SIGNIFICANT CHANGE UP (ref 32–36)
MCV RBC AUTO: 99.3 FL — SIGNIFICANT CHANGE UP (ref 80–100)
NITRITE UR-MCNC: NEGATIVE — SIGNIFICANT CHANGE UP
NONHDLC SERPL-MCNC: 71 MG/DL — SIGNIFICANT CHANGE UP
NRBC # BLD: 0 /100 WBCS — SIGNIFICANT CHANGE UP (ref 0–0)
NRBC BLD-RTO: 0 /100 WBCS — SIGNIFICANT CHANGE UP (ref 0–0)
PH UR: 5 — SIGNIFICANT CHANGE UP (ref 5–8)
PHOSPHATE SERPL-MCNC: 2.9 MG/DL — SIGNIFICANT CHANGE UP (ref 2.5–4.5)
PLATELET # BLD AUTO: 153 K/UL — SIGNIFICANT CHANGE UP (ref 150–400)
POTASSIUM SERPL-MCNC: 3.9 MMOL/L — SIGNIFICANT CHANGE UP (ref 3.5–5.3)
POTASSIUM SERPL-SCNC: 3.9 MMOL/L — SIGNIFICANT CHANGE UP (ref 3.5–5.3)
PROT UR-MCNC: SIGNIFICANT CHANGE UP
PROTHROM AB SERPL-ACNC: 12.6 SEC — SIGNIFICANT CHANGE UP (ref 9.5–13)
PTH-INTACT FLD-MCNC: 28 PG/ML — SIGNIFICANT CHANGE UP (ref 15–65)
RBC # BLD: 4.12 M/UL — LOW (ref 4.2–5.8)
RBC # FLD: 13.7 % — SIGNIFICANT CHANGE UP (ref 10.3–14.5)
RSV RNA NPH QL NAA+NON-PROBE: SIGNIFICANT CHANGE UP
SARS-COV-2 RNA SPEC QL NAA+PROBE: DETECTED
SODIUM SERPL-SCNC: 139 MMOL/L — SIGNIFICANT CHANGE UP (ref 135–145)
SP GR SPEC: 1.04 — HIGH (ref 1–1.03)
TRIGL SERPL-MCNC: 66 MG/DL — SIGNIFICANT CHANGE UP
UROBILINOGEN FLD QL: 1 — SIGNIFICANT CHANGE UP (ref 0.2–1)
WBC # BLD: 12.12 K/UL — HIGH (ref 3.8–10.5)
WBC # FLD AUTO: 12.12 K/UL — HIGH (ref 3.8–10.5)

## 2024-06-24 PROCEDURE — 99222 1ST HOSP IP/OBS MODERATE 55: CPT

## 2024-06-24 PROCEDURE — 71045 X-RAY EXAM CHEST 1 VIEW: CPT | Mod: 26

## 2024-06-24 PROCEDURE — 93279 PRGRMG DEV EVAL PM/LDLS PM: CPT | Mod: 26

## 2024-06-24 PROCEDURE — 99233 SBSQ HOSP IP/OBS HIGH 50: CPT

## 2024-06-24 RX ORDER — REMDESIVIR 5 MG/ML
200 INJECTION INTRAVENOUS EVERY 24 HOURS
Refills: 0 | Status: COMPLETED | OUTPATIENT
Start: 2024-06-24 | End: 2024-06-24

## 2024-06-24 RX ORDER — ACETAMINOPHEN 500 MG/5ML
1000 LIQUID (ML) ORAL ONCE
Refills: 0 | Status: COMPLETED | OUTPATIENT
Start: 2024-06-24 | End: 2024-06-24

## 2024-06-24 RX ORDER — REMDESIVIR 5 MG/ML
100 INJECTION INTRAVENOUS EVERY 24 HOURS
Refills: 0 | Status: COMPLETED | OUTPATIENT
Start: 2024-06-25 | End: 2024-06-26

## 2024-06-24 RX ORDER — CEFTRIAXONE 500 MG/1
1000 INJECTION, POWDER, FOR SOLUTION INTRAMUSCULAR; INTRAVENOUS EVERY 24 HOURS
Refills: 0 | Status: COMPLETED | OUTPATIENT
Start: 2024-06-24 | End: 2024-06-28

## 2024-06-24 RX ORDER — SODIUM CHLORIDE 9 G/1000ML
1000 INJECTION, SOLUTION INTRAVENOUS
Refills: 0 | Status: DISCONTINUED | OUTPATIENT
Start: 2024-06-24 | End: 2024-06-29

## 2024-06-24 RX ORDER — HEPARIN SODIUM 1000 [USP'U]/ML
5000 INJECTION INTRAVENOUS; SUBCUTANEOUS ONCE
Refills: 0 | Status: COMPLETED | OUTPATIENT
Start: 2024-06-24 | End: 2024-06-24

## 2024-06-24 RX ORDER — REMDESIVIR 5 MG/ML
INJECTION INTRAVENOUS
Refills: 0 | Status: COMPLETED | OUTPATIENT
Start: 2024-06-24 | End: 2024-06-26

## 2024-06-24 RX ADMIN — Medication 40 MILLIGRAM(S): at 19:02

## 2024-06-24 RX ADMIN — ATORVASTATIN CALCIUM 10 MILLIGRAM(S): 80 TABLET, FILM COATED ORAL at 21:36

## 2024-06-24 RX ADMIN — Medication 75 MILLILITER(S): at 08:47

## 2024-06-24 RX ADMIN — Medication 400 MILLIGRAM(S): at 05:25

## 2024-06-24 RX ADMIN — REMDESIVIR 200 MILLIGRAM(S): 5 INJECTION INTRAVENOUS at 21:28

## 2024-06-24 RX ADMIN — GABAPENTIN 300 MILLIGRAM(S): 400 CAPSULE ORAL at 21:36

## 2024-06-24 RX ADMIN — Medication 40 MILLIGRAM(S): at 05:30

## 2024-06-24 RX ADMIN — Medication 20 MILLIGRAM(S): at 05:30

## 2024-06-24 RX ADMIN — Medication 75 MILLILITER(S): at 05:25

## 2024-06-24 RX ADMIN — Medication 20 MILLIGRAM(S): at 19:02

## 2024-06-24 RX ADMIN — SODIUM CHLORIDE 65 MILLILITER(S): 9 INJECTION, SOLUTION INTRAVENOUS at 23:44

## 2024-06-24 RX ADMIN — CEFTRIAXONE 100 MILLIGRAM(S): 500 INJECTION, POWDER, FOR SOLUTION INTRAMUSCULAR; INTRAVENOUS at 16:32

## 2024-06-24 RX ADMIN — CLONAZEPAM 1.5 MILLIGRAM(S): 0.5 TABLET ORAL at 21:36

## 2024-06-25 DIAGNOSIS — J18.9 PNEUMONIA, UNSPECIFIED ORGANISM: ICD-10-CM

## 2024-06-25 LAB
ALBUMIN SERPL ELPH-MCNC: 2.8 G/DL — LOW (ref 3.3–5)
ALP SERPL-CCNC: 61 U/L — SIGNIFICANT CHANGE UP (ref 40–120)
ALT FLD-CCNC: 13 U/L — SIGNIFICANT CHANGE UP (ref 12–78)
ANION GAP SERPL CALC-SCNC: 9 MMOL/L — SIGNIFICANT CHANGE UP (ref 5–17)
AST SERPL-CCNC: 19 U/L — SIGNIFICANT CHANGE UP (ref 15–37)
BASOPHILS # BLD AUTO: 0 K/UL — SIGNIFICANT CHANGE UP (ref 0–0.2)
BASOPHILS NFR BLD AUTO: 0 % — SIGNIFICANT CHANGE UP (ref 0–2)
BILIRUB DIRECT SERPL-MCNC: 0.4 MG/DL — HIGH (ref 0–0.3)
BILIRUB INDIRECT FLD-MCNC: 0.6 MG/DL — SIGNIFICANT CHANGE UP (ref 0.2–1)
BILIRUB SERPL-MCNC: 1 MG/DL — SIGNIFICANT CHANGE UP (ref 0.2–1.2)
BUN SERPL-MCNC: 29 MG/DL — HIGH (ref 7–23)
CALCIUM SERPL-MCNC: 8.9 MG/DL — SIGNIFICANT CHANGE UP (ref 8.5–10.1)
CHLORIDE SERPL-SCNC: 105 MMOL/L — SIGNIFICANT CHANGE UP (ref 96–108)
CO2 SERPL-SCNC: 25 MMOL/L — SIGNIFICANT CHANGE UP (ref 22–31)
CREAT SERPL-MCNC: 0.99 MG/DL — SIGNIFICANT CHANGE UP (ref 0.5–1.3)
CRP SERPL-MCNC: 169 MG/L — HIGH
EGFR: 77 ML/MIN/1.73M2 — SIGNIFICANT CHANGE UP
EGFR: 77 ML/MIN/1.73M2 — SIGNIFICANT CHANGE UP
EOSINOPHIL # BLD AUTO: 0 K/UL — SIGNIFICANT CHANGE UP (ref 0–0.5)
EOSINOPHIL NFR BLD AUTO: 0 % — SIGNIFICANT CHANGE UP (ref 0–6)
GLUCOSE SERPL-MCNC: 84 MG/DL — SIGNIFICANT CHANGE UP (ref 70–99)
HCT VFR BLD CALC: 38.8 % — LOW (ref 39–50)
HGB BLD-MCNC: 13.3 G/DL — SIGNIFICANT CHANGE UP (ref 13–17)
LYMPHOCYTES # BLD AUTO: 0.31 K/UL — LOW (ref 1–3.3)
LYMPHOCYTES # BLD AUTO: 5 % — LOW (ref 13–44)
MAGNESIUM SERPL-MCNC: 1.9 MG/DL — SIGNIFICANT CHANGE UP (ref 1.6–2.6)
MCHC RBC-ENTMCNC: 34.1 PG — HIGH (ref 27–34)
MCHC RBC-ENTMCNC: 34.3 GM/DL — SIGNIFICANT CHANGE UP (ref 32–36)
MCV RBC AUTO: 99.5 FL — SIGNIFICANT CHANGE UP (ref 80–100)
MONOCYTES # BLD AUTO: 0.43 K/UL — SIGNIFICANT CHANGE UP (ref 0–0.9)
MONOCYTES NFR BLD AUTO: 7 % — SIGNIFICANT CHANGE UP (ref 2–14)
NEUTROPHILS # BLD AUTO: 5.46 K/UL — SIGNIFICANT CHANGE UP (ref 1.8–7.4)
NEUTROPHILS NFR BLD AUTO: 88 % — HIGH (ref 43–77)
NRBC # BLD: SIGNIFICANT CHANGE UP /100 WBCS (ref 0–0)
NRBC BLD-RTO: SIGNIFICANT CHANGE UP /100 WBCS (ref 0–0)
PHOSPHATE SERPL-MCNC: 2.1 MG/DL — LOW (ref 2.5–4.5)
PLATELET # BLD AUTO: 131 K/UL — LOW (ref 150–400)
POTASSIUM SERPL-MCNC: 3.8 MMOL/L — SIGNIFICANT CHANGE UP (ref 3.5–5.3)
POTASSIUM SERPL-SCNC: 3.8 MMOL/L — SIGNIFICANT CHANGE UP (ref 3.5–5.3)
PROCALCITONIN SERPL-MCNC: 2.09 NG/ML — HIGH
PROT SERPL-MCNC: 5.8 G/DL — LOW (ref 6–8.3)
RBC # BLD: 3.9 M/UL — LOW (ref 4.2–5.8)
RBC # FLD: 14.1 % — SIGNIFICANT CHANGE UP (ref 10.3–14.5)
SODIUM SERPL-SCNC: 139 MMOL/L — SIGNIFICANT CHANGE UP (ref 135–145)
WBC # BLD: 6.21 K/UL — SIGNIFICANT CHANGE UP (ref 3.8–10.5)
WBC # FLD AUTO: 6.21 K/UL — SIGNIFICANT CHANGE UP (ref 3.8–10.5)

## 2024-06-25 PROCEDURE — 99233 SBSQ HOSP IP/OBS HIGH 50: CPT

## 2024-06-25 PROCEDURE — 99232 SBSQ HOSP IP/OBS MODERATE 35: CPT

## 2024-06-25 RX ORDER — ENOXAPARIN SODIUM 100 MG/ML
40 INJECTION SUBCUTANEOUS EVERY 24 HOURS
Refills: 0 | Status: DISCONTINUED | OUTPATIENT
Start: 2024-06-25 | End: 2024-07-06

## 2024-06-25 RX ORDER — POTASSIUM PHOSPHATE, MONOBASIC POTASSIUM PHOSPHATE, DIBASIC INJECTION, 236; 224 MG/ML; MG/ML
15 SOLUTION, CONCENTRATE INTRAVENOUS ONCE
Refills: 0 | Status: COMPLETED | OUTPATIENT
Start: 2024-06-25 | End: 2024-06-25

## 2024-06-25 RX ADMIN — GABAPENTIN 300 MILLIGRAM(S): 400 CAPSULE ORAL at 06:06

## 2024-06-25 RX ADMIN — Medication 20 MILLIGRAM(S): at 06:06

## 2024-06-25 RX ADMIN — Medication 40 MILLIGRAM(S): at 06:05

## 2024-06-25 RX ADMIN — ATORVASTATIN CALCIUM 10 MILLIGRAM(S): 80 TABLET, FILM COATED ORAL at 21:24

## 2024-06-25 RX ADMIN — REMDESIVIR 200 MILLIGRAM(S): 5 INJECTION INTRAVENOUS at 20:15

## 2024-06-25 RX ADMIN — CLONAZEPAM 1.5 MILLIGRAM(S): 0.5 TABLET ORAL at 21:25

## 2024-06-25 RX ADMIN — Medication 40 MILLIGRAM(S): at 17:20

## 2024-06-25 RX ADMIN — CEFTRIAXONE 100 MILLIGRAM(S): 500 INJECTION, POWDER, FOR SOLUTION INTRAMUSCULAR; INTRAVENOUS at 15:17

## 2024-06-25 RX ADMIN — Medication 81 MILLIGRAM(S): at 12:22

## 2024-06-25 RX ADMIN — GABAPENTIN 300 MILLIGRAM(S): 400 CAPSULE ORAL at 21:25

## 2024-06-25 RX ADMIN — ENOXAPARIN SODIUM 40 MILLIGRAM(S): 100 INJECTION SUBCUTANEOUS at 18:37

## 2024-06-25 RX ADMIN — POTASSIUM PHOSPHATE, MONOBASIC POTASSIUM PHOSPHATE, DIBASIC INJECTION, 62.5 MILLIMOLE(S): 236; 224 SOLUTION, CONCENTRATE INTRAVENOUS at 12:23

## 2024-06-25 RX ADMIN — GABAPENTIN 300 MILLIGRAM(S): 400 CAPSULE ORAL at 12:22

## 2024-06-25 RX ADMIN — Medication 3 MILLIGRAM(S): at 00:11

## 2024-06-26 LAB
ALBUMIN SERPL ELPH-MCNC: 2.6 G/DL — LOW (ref 3.3–5)
ALP SERPL-CCNC: 59 U/L — SIGNIFICANT CHANGE UP (ref 40–120)
ALT FLD-CCNC: 14 U/L — SIGNIFICANT CHANGE UP (ref 12–78)
ANION GAP SERPL CALC-SCNC: 7 MMOL/L — SIGNIFICANT CHANGE UP (ref 5–17)
AST SERPL-CCNC: 22 U/L — SIGNIFICANT CHANGE UP (ref 15–37)
BASOPHILS # BLD AUTO: 0.02 K/UL — SIGNIFICANT CHANGE UP (ref 0–0.2)
BASOPHILS NFR BLD AUTO: 0.4 % — SIGNIFICANT CHANGE UP (ref 0–2)
BILIRUB SERPL-MCNC: 0.9 MG/DL — SIGNIFICANT CHANGE UP (ref 0.2–1.2)
BUN SERPL-MCNC: 25 MG/DL — HIGH (ref 7–23)
CALCIUM SERPL-MCNC: 8.8 MG/DL — SIGNIFICANT CHANGE UP (ref 8.5–10.1)
CHLORIDE SERPL-SCNC: 102 MMOL/L — SIGNIFICANT CHANGE UP (ref 96–108)
CO2 SERPL-SCNC: 29 MMOL/L — SIGNIFICANT CHANGE UP (ref 22–31)
CREAT SERPL-MCNC: 0.83 MG/DL — SIGNIFICANT CHANGE UP (ref 0.5–1.3)
D DIMER BLD IA.RAPID-MCNC: 848 NG/ML DDU — HIGH
EGFR: 88 ML/MIN/1.73M2 — SIGNIFICANT CHANGE UP
EGFR: 88 ML/MIN/1.73M2 — SIGNIFICANT CHANGE UP
EOSINOPHIL # BLD AUTO: 0.02 K/UL — SIGNIFICANT CHANGE UP (ref 0–0.5)
EOSINOPHIL NFR BLD AUTO: 0.4 % — SIGNIFICANT CHANGE UP (ref 0–6)
FOLATE SERPL-MCNC: >20 NG/ML — SIGNIFICANT CHANGE UP
GLUCOSE SERPL-MCNC: 85 MG/DL — SIGNIFICANT CHANGE UP (ref 70–99)
HCT VFR BLD CALC: 38.8 % — LOW (ref 39–50)
HGB BLD-MCNC: 13.4 G/DL — SIGNIFICANT CHANGE UP (ref 13–17)
IMM GRANULOCYTES NFR BLD AUTO: 0.4 % — SIGNIFICANT CHANGE UP (ref 0–0.9)
LYMPHOCYTES # BLD AUTO: 0.55 K/UL — LOW (ref 1–3.3)
LYMPHOCYTES # BLD AUTO: 10.3 % — LOW (ref 13–44)
MAGNESIUM SERPL-MCNC: 1.9 MG/DL — SIGNIFICANT CHANGE UP (ref 1.6–2.6)
MCHC RBC-ENTMCNC: 33.8 PG — SIGNIFICANT CHANGE UP (ref 27–34)
MCHC RBC-ENTMCNC: 34.5 GM/DL — SIGNIFICANT CHANGE UP (ref 32–36)
MCV RBC AUTO: 97.7 FL — SIGNIFICANT CHANGE UP (ref 80–100)
MONOCYTES # BLD AUTO: 0.65 K/UL — SIGNIFICANT CHANGE UP (ref 0–0.9)
MONOCYTES NFR BLD AUTO: 12.2 % — SIGNIFICANT CHANGE UP (ref 2–14)
NEUTROPHILS # BLD AUTO: 4.07 K/UL — SIGNIFICANT CHANGE UP (ref 1.8–7.4)
NEUTROPHILS NFR BLD AUTO: 76.3 % — SIGNIFICANT CHANGE UP (ref 43–77)
NRBC # BLD: 0 /100 WBCS — SIGNIFICANT CHANGE UP (ref 0–0)
NRBC BLD-RTO: 0 /100 WBCS — SIGNIFICANT CHANGE UP (ref 0–0)
PHOSPHATE SERPL-MCNC: 2.2 MG/DL — LOW (ref 2.5–4.5)
PLATELET # BLD AUTO: 138 K/UL — LOW (ref 150–400)
POTASSIUM SERPL-MCNC: 3.5 MMOL/L — SIGNIFICANT CHANGE UP (ref 3.5–5.3)
POTASSIUM SERPL-SCNC: 3.5 MMOL/L — SIGNIFICANT CHANGE UP (ref 3.5–5.3)
PROCALCITONIN SERPL-MCNC: 1.32 NG/ML — HIGH
PROT SERPL-MCNC: 5.5 G/DL — LOW (ref 6–8.3)
RBC # BLD: 3.97 M/UL — LOW (ref 4.2–5.8)
RBC # FLD: 13.7 % — SIGNIFICANT CHANGE UP (ref 10.3–14.5)
SODIUM SERPL-SCNC: 138 MMOL/L — SIGNIFICANT CHANGE UP (ref 135–145)
VIT B12 SERPL-MCNC: >2000 PG/ML — HIGH (ref 232–1245)
WBC # BLD: 5.33 K/UL — SIGNIFICANT CHANGE UP (ref 3.8–10.5)
WBC # FLD AUTO: 5.33 K/UL — SIGNIFICANT CHANGE UP (ref 3.8–10.5)

## 2024-06-26 PROCEDURE — 99232 SBSQ HOSP IP/OBS MODERATE 35: CPT

## 2024-06-26 RX ORDER — POTASSIUM PHOSPHATE, MONOBASIC POTASSIUM PHOSPHATE, DIBASIC INJECTION, 236; 224 MG/ML; MG/ML
30 SOLUTION, CONCENTRATE INTRAVENOUS ONCE
Refills: 0 | Status: COMPLETED | OUTPATIENT
Start: 2024-06-26 | End: 2024-06-26

## 2024-06-26 RX ADMIN — ENOXAPARIN SODIUM 40 MILLIGRAM(S): 100 INJECTION SUBCUTANEOUS at 20:17

## 2024-06-26 RX ADMIN — Medication 81 MILLIGRAM(S): at 13:15

## 2024-06-26 RX ADMIN — GABAPENTIN 300 MILLIGRAM(S): 400 CAPSULE ORAL at 22:05

## 2024-06-26 RX ADMIN — POTASSIUM PHOSPHATE, MONOBASIC POTASSIUM PHOSPHATE, DIBASIC INJECTION, 83.33 MILLIMOLE(S): 236; 224 SOLUTION, CONCENTRATE INTRAVENOUS at 13:16

## 2024-06-26 RX ADMIN — CEFTRIAXONE 100 MILLIGRAM(S): 500 INJECTION, POWDER, FOR SOLUTION INTRAMUSCULAR; INTRAVENOUS at 15:32

## 2024-06-26 RX ADMIN — REMDESIVIR 200 MILLIGRAM(S): 5 INJECTION INTRAVENOUS at 20:00

## 2024-06-26 RX ADMIN — Medication 40 MILLIGRAM(S): at 20:07

## 2024-06-26 RX ADMIN — GABAPENTIN 300 MILLIGRAM(S): 400 CAPSULE ORAL at 06:26

## 2024-06-26 RX ADMIN — CLONAZEPAM 1.5 MILLIGRAM(S): 0.5 TABLET ORAL at 22:05

## 2024-06-26 RX ADMIN — ATORVASTATIN CALCIUM 10 MILLIGRAM(S): 80 TABLET, FILM COATED ORAL at 22:05

## 2024-06-26 RX ADMIN — GABAPENTIN 300 MILLIGRAM(S): 400 CAPSULE ORAL at 13:15

## 2024-06-26 RX ADMIN — Medication 40 MILLIGRAM(S): at 06:26

## 2024-06-27 LAB
ANION GAP SERPL CALC-SCNC: 8 MMOL/L — SIGNIFICANT CHANGE UP (ref 5–17)
BASOPHILS # BLD AUTO: 0.01 K/UL — SIGNIFICANT CHANGE UP (ref 0–0.2)
BASOPHILS NFR BLD AUTO: 0.2 % — SIGNIFICANT CHANGE UP (ref 0–2)
BUN SERPL-MCNC: 19 MG/DL — SIGNIFICANT CHANGE UP (ref 7–23)
CALCIUM SERPL-MCNC: 8.7 MG/DL — SIGNIFICANT CHANGE UP (ref 8.5–10.1)
CHLORIDE SERPL-SCNC: 105 MMOL/L — SIGNIFICANT CHANGE UP (ref 96–108)
CO2 SERPL-SCNC: 26 MMOL/L — SIGNIFICANT CHANGE UP (ref 22–31)
CREAT SERPL-MCNC: 0.73 MG/DL — SIGNIFICANT CHANGE UP (ref 0.5–1.3)
EGFR: 92 ML/MIN/1.73M2 — SIGNIFICANT CHANGE UP
EGFR: 92 ML/MIN/1.73M2 — SIGNIFICANT CHANGE UP
EOSINOPHIL # BLD AUTO: 0.03 K/UL — SIGNIFICANT CHANGE UP (ref 0–0.5)
EOSINOPHIL NFR BLD AUTO: 0.7 % — SIGNIFICANT CHANGE UP (ref 0–6)
GLUCOSE SERPL-MCNC: 92 MG/DL — SIGNIFICANT CHANGE UP (ref 70–99)
HCT VFR BLD CALC: 39.1 % — SIGNIFICANT CHANGE UP (ref 39–50)
HGB BLD-MCNC: 13.5 G/DL — SIGNIFICANT CHANGE UP (ref 13–17)
IMM GRANULOCYTES NFR BLD AUTO: 0.5 % — SIGNIFICANT CHANGE UP (ref 0–0.9)
LYMPHOCYTES # BLD AUTO: 0.67 K/UL — LOW (ref 1–3.3)
LYMPHOCYTES # BLD AUTO: 16.1 % — SIGNIFICANT CHANGE UP (ref 13–44)
MAGNESIUM SERPL-MCNC: 1.9 MG/DL — SIGNIFICANT CHANGE UP (ref 1.6–2.6)
MCHC RBC-ENTMCNC: 33.8 PG — SIGNIFICANT CHANGE UP (ref 27–34)
MCHC RBC-ENTMCNC: 34.5 GM/DL — SIGNIFICANT CHANGE UP (ref 32–36)
MCV RBC AUTO: 98 FL — SIGNIFICANT CHANGE UP (ref 80–100)
MONOCYTES # BLD AUTO: 0.51 K/UL — SIGNIFICANT CHANGE UP (ref 0–0.9)
MONOCYTES NFR BLD AUTO: 12.2 % — SIGNIFICANT CHANGE UP (ref 2–14)
NEUTROPHILS # BLD AUTO: 2.93 K/UL — SIGNIFICANT CHANGE UP (ref 1.8–7.4)
NEUTROPHILS NFR BLD AUTO: 70.3 % — SIGNIFICANT CHANGE UP (ref 43–77)
NRBC # BLD: 0 /100 WBCS — SIGNIFICANT CHANGE UP (ref 0–0)
NRBC BLD-RTO: 0 /100 WBCS — SIGNIFICANT CHANGE UP (ref 0–0)
PHOSPHATE SERPL-MCNC: 3.5 MG/DL — SIGNIFICANT CHANGE UP (ref 2.5–4.5)
PLATELET # BLD AUTO: 133 K/UL — LOW (ref 150–400)
POTASSIUM SERPL-MCNC: 3.5 MMOL/L — SIGNIFICANT CHANGE UP (ref 3.5–5.3)
POTASSIUM SERPL-SCNC: 3.5 MMOL/L — SIGNIFICANT CHANGE UP (ref 3.5–5.3)
RBC # BLD: 3.99 M/UL — LOW (ref 4.2–5.8)
RBC # FLD: 13.7 % — SIGNIFICANT CHANGE UP (ref 10.3–14.5)
SODIUM SERPL-SCNC: 139 MMOL/L — SIGNIFICANT CHANGE UP (ref 135–145)
WBC # BLD: 4.17 K/UL — SIGNIFICANT CHANGE UP (ref 3.8–10.5)
WBC # FLD AUTO: 4.17 K/UL — SIGNIFICANT CHANGE UP (ref 3.8–10.5)

## 2024-06-27 PROCEDURE — 99232 SBSQ HOSP IP/OBS MODERATE 35: CPT

## 2024-06-27 DEVICE — HEMOSPRAY HEMOSTAT ENDO 7F: Type: IMPLANTABLE DEVICE | Status: FUNCTIONAL

## 2024-06-27 DEVICE — ESOPHAGEAL BALLOON CATH CRE FIXED WIRE 6-7-8MM: Type: IMPLANTABLE DEVICE | Status: FUNCTIONAL

## 2024-06-27 RX ADMIN — ENOXAPARIN SODIUM 40 MILLIGRAM(S): 100 INJECTION SUBCUTANEOUS at 17:21

## 2024-06-27 RX ADMIN — ATORVASTATIN CALCIUM 10 MILLIGRAM(S): 80 TABLET, FILM COATED ORAL at 22:01

## 2024-06-27 RX ADMIN — GABAPENTIN 300 MILLIGRAM(S): 400 CAPSULE ORAL at 22:00

## 2024-06-27 RX ADMIN — CEFTRIAXONE 100 MILLIGRAM(S): 500 INJECTION, POWDER, FOR SOLUTION INTRAMUSCULAR; INTRAVENOUS at 13:29

## 2024-06-27 RX ADMIN — Medication 40 MILLIGRAM(S): at 17:21

## 2024-06-27 RX ADMIN — GABAPENTIN 300 MILLIGRAM(S): 400 CAPSULE ORAL at 13:29

## 2024-06-27 RX ADMIN — Medication 81 MILLIGRAM(S): at 13:29

## 2024-06-27 RX ADMIN — GABAPENTIN 300 MILLIGRAM(S): 400 CAPSULE ORAL at 06:04

## 2024-06-27 RX ADMIN — Medication 40 MILLIGRAM(S): at 06:04

## 2024-06-27 RX ADMIN — CLONAZEPAM 1.5 MILLIGRAM(S): 0.5 TABLET ORAL at 22:00

## 2024-06-27 RX ADMIN — SODIUM CHLORIDE 50 MILLILITER(S): 9 INJECTION, SOLUTION INTRAVENOUS at 01:25

## 2024-06-28 LAB
ANION GAP SERPL CALC-SCNC: 9 MMOL/L — SIGNIFICANT CHANGE UP (ref 5–17)
BASOPHILS # BLD AUTO: 0.01 K/UL — SIGNIFICANT CHANGE UP (ref 0–0.2)
BASOPHILS NFR BLD AUTO: 0.1 % — SIGNIFICANT CHANGE UP (ref 0–2)
BUN SERPL-MCNC: 21 MG/DL — SIGNIFICANT CHANGE UP (ref 7–23)
CALCIUM SERPL-MCNC: 8.7 MG/DL — SIGNIFICANT CHANGE UP (ref 8.5–10.1)
CHLORIDE SERPL-SCNC: 102 MMOL/L — SIGNIFICANT CHANGE UP (ref 96–108)
CO2 SERPL-SCNC: 26 MMOL/L — SIGNIFICANT CHANGE UP (ref 22–31)
CREAT SERPL-MCNC: 0.71 MG/DL — SIGNIFICANT CHANGE UP (ref 0.5–1.3)
EGFR: 93 ML/MIN/1.73M2 — SIGNIFICANT CHANGE UP
EGFR: 93 ML/MIN/1.73M2 — SIGNIFICANT CHANGE UP
EOSINOPHIL # BLD AUTO: 0.01 K/UL — SIGNIFICANT CHANGE UP (ref 0–0.5)
EOSINOPHIL NFR BLD AUTO: 0.1 % — SIGNIFICANT CHANGE UP (ref 0–6)
GLUCOSE SERPL-MCNC: 84 MG/DL — SIGNIFICANT CHANGE UP (ref 70–99)
HCT VFR BLD CALC: 38.6 % — LOW (ref 39–50)
HGB BLD-MCNC: 13.5 G/DL — SIGNIFICANT CHANGE UP (ref 13–17)
IMM GRANULOCYTES NFR BLD AUTO: 0.4 % — SIGNIFICANT CHANGE UP (ref 0–0.9)
LYMPHOCYTES # BLD AUTO: 0.43 K/UL — LOW (ref 1–3.3)
LYMPHOCYTES # BLD AUTO: 4.2 % — LOW (ref 13–44)
MAGNESIUM SERPL-MCNC: 1.8 MG/DL — SIGNIFICANT CHANGE UP (ref 1.6–2.6)
MCHC RBC-ENTMCNC: 33.8 PG — SIGNIFICANT CHANGE UP (ref 27–34)
MCHC RBC-ENTMCNC: 35 GM/DL — SIGNIFICANT CHANGE UP (ref 32–36)
MCV RBC AUTO: 96.5 FL — SIGNIFICANT CHANGE UP (ref 80–100)
MONOCYTES # BLD AUTO: 0.75 K/UL — SIGNIFICANT CHANGE UP (ref 0–0.9)
MONOCYTES NFR BLD AUTO: 7.3 % — SIGNIFICANT CHANGE UP (ref 2–14)
NEUTROPHILS # BLD AUTO: 9.02 K/UL — HIGH (ref 1.8–7.4)
NEUTROPHILS NFR BLD AUTO: 87.9 % — HIGH (ref 43–77)
NRBC # BLD: 0 /100 WBCS — SIGNIFICANT CHANGE UP (ref 0–0)
NRBC BLD-RTO: 0 /100 WBCS — SIGNIFICANT CHANGE UP (ref 0–0)
PHOSPHATE SERPL-MCNC: 2.7 MG/DL — SIGNIFICANT CHANGE UP (ref 2.5–4.5)
PLATELET # BLD AUTO: 142 K/UL — LOW (ref 150–400)
POTASSIUM SERPL-MCNC: 3.9 MMOL/L — SIGNIFICANT CHANGE UP (ref 3.5–5.3)
POTASSIUM SERPL-SCNC: 3.9 MMOL/L — SIGNIFICANT CHANGE UP (ref 3.5–5.3)
RBC # BLD: 4 M/UL — LOW (ref 4.2–5.8)
RBC # FLD: 13.3 % — SIGNIFICANT CHANGE UP (ref 10.3–14.5)
SODIUM SERPL-SCNC: 137 MMOL/L — SIGNIFICANT CHANGE UP (ref 135–145)
WBC # BLD: 10.26 K/UL — SIGNIFICANT CHANGE UP (ref 3.8–10.5)
WBC # FLD AUTO: 10.26 K/UL — SIGNIFICANT CHANGE UP (ref 3.8–10.5)

## 2024-06-28 PROCEDURE — 99232 SBSQ HOSP IP/OBS MODERATE 35: CPT

## 2024-06-28 RX ADMIN — CLONAZEPAM 1.5 MILLIGRAM(S): 0.5 TABLET ORAL at 21:47

## 2024-06-28 RX ADMIN — ATORVASTATIN CALCIUM 10 MILLIGRAM(S): 80 TABLET, FILM COATED ORAL at 21:47

## 2024-06-28 RX ADMIN — GABAPENTIN 300 MILLIGRAM(S): 400 CAPSULE ORAL at 06:03

## 2024-06-28 RX ADMIN — GABAPENTIN 300 MILLIGRAM(S): 400 CAPSULE ORAL at 13:30

## 2024-06-28 RX ADMIN — Medication 40 MILLIGRAM(S): at 06:03

## 2024-06-28 RX ADMIN — Medication 81 MILLIGRAM(S): at 13:30

## 2024-06-28 RX ADMIN — Medication 40 MILLIGRAM(S): at 18:52

## 2024-06-28 RX ADMIN — GABAPENTIN 300 MILLIGRAM(S): 400 CAPSULE ORAL at 21:47

## 2024-06-28 RX ADMIN — ENOXAPARIN SODIUM 40 MILLIGRAM(S): 100 INJECTION SUBCUTANEOUS at 18:52

## 2024-06-28 RX ADMIN — CEFTRIAXONE 100 MILLIGRAM(S): 500 INJECTION, POWDER, FOR SOLUTION INTRAMUSCULAR; INTRAVENOUS at 13:32

## 2024-06-29 LAB
ANION GAP SERPL CALC-SCNC: 6 MMOL/L — SIGNIFICANT CHANGE UP (ref 5–17)
BASOPHILS # BLD AUTO: 0.02 K/UL — SIGNIFICANT CHANGE UP (ref 0–0.2)
BASOPHILS NFR BLD AUTO: 0.4 % — SIGNIFICANT CHANGE UP (ref 0–2)
BUN SERPL-MCNC: 17 MG/DL — SIGNIFICANT CHANGE UP (ref 7–23)
CALCIUM SERPL-MCNC: 8.8 MG/DL — SIGNIFICANT CHANGE UP (ref 8.5–10.1)
CHLORIDE SERPL-SCNC: 104 MMOL/L — SIGNIFICANT CHANGE UP (ref 96–108)
CO2 SERPL-SCNC: 28 MMOL/L — SIGNIFICANT CHANGE UP (ref 22–31)
CREAT SERPL-MCNC: 0.79 MG/DL — SIGNIFICANT CHANGE UP (ref 0.5–1.3)
CULTURE RESULTS: SIGNIFICANT CHANGE UP
CULTURE RESULTS: SIGNIFICANT CHANGE UP
EGFR: 90 ML/MIN/1.73M2 — SIGNIFICANT CHANGE UP
EGFR: 90 ML/MIN/1.73M2 — SIGNIFICANT CHANGE UP
EOSINOPHIL # BLD AUTO: 0.05 K/UL — SIGNIFICANT CHANGE UP (ref 0–0.5)
EOSINOPHIL NFR BLD AUTO: 1 % — SIGNIFICANT CHANGE UP (ref 0–6)
GLUCOSE SERPL-MCNC: 94 MG/DL — SIGNIFICANT CHANGE UP (ref 70–99)
HCT VFR BLD CALC: 37.8 % — LOW (ref 39–50)
HGB BLD-MCNC: 13.5 G/DL — SIGNIFICANT CHANGE UP (ref 13–17)
IMM GRANULOCYTES NFR BLD AUTO: 0.6 % — SIGNIFICANT CHANGE UP (ref 0–0.9)
LYMPHOCYTES # BLD AUTO: 0.75 K/UL — LOW (ref 1–3.3)
LYMPHOCYTES # BLD AUTO: 14.3 % — SIGNIFICANT CHANGE UP (ref 13–44)
MAGNESIUM SERPL-MCNC: 2 MG/DL — SIGNIFICANT CHANGE UP (ref 1.6–2.6)
MCHC RBC-ENTMCNC: 34.6 PG — HIGH (ref 27–34)
MCHC RBC-ENTMCNC: 35.7 GM/DL — SIGNIFICANT CHANGE UP (ref 32–36)
MCV RBC AUTO: 96.9 FL — SIGNIFICANT CHANGE UP (ref 80–100)
MONOCYTES # BLD AUTO: 0.68 K/UL — SIGNIFICANT CHANGE UP (ref 0–0.9)
MONOCYTES NFR BLD AUTO: 13 % — SIGNIFICANT CHANGE UP (ref 2–14)
NEUTROPHILS # BLD AUTO: 3.72 K/UL — SIGNIFICANT CHANGE UP (ref 1.8–7.4)
NEUTROPHILS NFR BLD AUTO: 70.7 % — SIGNIFICANT CHANGE UP (ref 43–77)
NRBC # BLD: 0 /100 WBCS — SIGNIFICANT CHANGE UP (ref 0–0)
NRBC BLD-RTO: 0 /100 WBCS — SIGNIFICANT CHANGE UP (ref 0–0)
PLATELET # BLD AUTO: 145 K/UL — LOW (ref 150–400)
POTASSIUM SERPL-MCNC: 3.8 MMOL/L — SIGNIFICANT CHANGE UP (ref 3.5–5.3)
POTASSIUM SERPL-SCNC: 3.8 MMOL/L — SIGNIFICANT CHANGE UP (ref 3.5–5.3)
RBC # BLD: 3.9 M/UL — LOW (ref 4.2–5.8)
RBC # FLD: 13.3 % — SIGNIFICANT CHANGE UP (ref 10.3–14.5)
SODIUM SERPL-SCNC: 138 MMOL/L — SIGNIFICANT CHANGE UP (ref 135–145)
SPECIMEN SOURCE: SIGNIFICANT CHANGE UP
SPECIMEN SOURCE: SIGNIFICANT CHANGE UP
WBC # BLD: 5.25 K/UL — SIGNIFICANT CHANGE UP (ref 3.8–10.5)
WBC # FLD AUTO: 5.25 K/UL — SIGNIFICANT CHANGE UP (ref 3.8–10.5)

## 2024-06-29 PROCEDURE — 99232 SBSQ HOSP IP/OBS MODERATE 35: CPT

## 2024-06-29 RX ADMIN — ATORVASTATIN CALCIUM 10 MILLIGRAM(S): 80 TABLET, FILM COATED ORAL at 22:36

## 2024-06-29 RX ADMIN — ENOXAPARIN SODIUM 40 MILLIGRAM(S): 100 INJECTION SUBCUTANEOUS at 20:16

## 2024-06-29 RX ADMIN — GABAPENTIN 300 MILLIGRAM(S): 400 CAPSULE ORAL at 15:51

## 2024-06-29 RX ADMIN — Medication 81 MILLIGRAM(S): at 15:51

## 2024-06-29 RX ADMIN — CLONAZEPAM 1.5 MILLIGRAM(S): 0.5 TABLET ORAL at 22:36

## 2024-06-29 RX ADMIN — GABAPENTIN 300 MILLIGRAM(S): 400 CAPSULE ORAL at 22:36

## 2024-06-29 RX ADMIN — Medication 40 MILLIGRAM(S): at 05:33

## 2024-06-29 RX ADMIN — GABAPENTIN 300 MILLIGRAM(S): 400 CAPSULE ORAL at 05:34

## 2024-06-29 RX ADMIN — Medication 40 MILLIGRAM(S): at 20:17

## 2024-06-30 LAB
ANION GAP SERPL CALC-SCNC: 7 MMOL/L — SIGNIFICANT CHANGE UP (ref 5–17)
BASOPHILS # BLD AUTO: 0.01 K/UL — SIGNIFICANT CHANGE UP (ref 0–0.2)
BASOPHILS NFR BLD AUTO: 0.2 % — SIGNIFICANT CHANGE UP (ref 0–2)
BUN SERPL-MCNC: 19 MG/DL — SIGNIFICANT CHANGE UP (ref 7–23)
CALCIUM SERPL-MCNC: 9 MG/DL — SIGNIFICANT CHANGE UP (ref 8.5–10.1)
CHLORIDE SERPL-SCNC: 104 MMOL/L — SIGNIFICANT CHANGE UP (ref 96–108)
CO2 SERPL-SCNC: 29 MMOL/L — SIGNIFICANT CHANGE UP (ref 22–31)
CREAT SERPL-MCNC: 0.67 MG/DL — SIGNIFICANT CHANGE UP (ref 0.5–1.3)
CULTURE RESULTS: NO GROWTH — SIGNIFICANT CHANGE UP
EGFR: 94 ML/MIN/1.73M2 — SIGNIFICANT CHANGE UP
EGFR: 94 ML/MIN/1.73M2 — SIGNIFICANT CHANGE UP
EOSINOPHIL # BLD AUTO: 0.08 K/UL — SIGNIFICANT CHANGE UP (ref 0–0.5)
EOSINOPHIL NFR BLD AUTO: 1.6 % — SIGNIFICANT CHANGE UP (ref 0–6)
GLUCOSE SERPL-MCNC: 101 MG/DL — HIGH (ref 70–99)
HCT VFR BLD CALC: 36.5 % — LOW (ref 39–50)
HGB BLD-MCNC: 12.4 G/DL — LOW (ref 13–17)
IMM GRANULOCYTES NFR BLD AUTO: 0.8 % — SIGNIFICANT CHANGE UP (ref 0–0.9)
LYMPHOCYTES # BLD AUTO: 0.89 K/UL — LOW (ref 1–3.3)
LYMPHOCYTES # BLD AUTO: 18 % — SIGNIFICANT CHANGE UP (ref 13–44)
MCHC RBC-ENTMCNC: 33.5 PG — SIGNIFICANT CHANGE UP (ref 27–34)
MCHC RBC-ENTMCNC: 34 GM/DL — SIGNIFICANT CHANGE UP (ref 32–36)
MCV RBC AUTO: 98.6 FL — SIGNIFICANT CHANGE UP (ref 80–100)
MONOCYTES # BLD AUTO: 0.65 K/UL — SIGNIFICANT CHANGE UP (ref 0–0.9)
MONOCYTES NFR BLD AUTO: 13.1 % — SIGNIFICANT CHANGE UP (ref 2–14)
NEUTROPHILS # BLD AUTO: 3.28 K/UL — SIGNIFICANT CHANGE UP (ref 1.8–7.4)
NEUTROPHILS NFR BLD AUTO: 66.3 % — SIGNIFICANT CHANGE UP (ref 43–77)
NRBC # BLD: 0 /100 WBCS — SIGNIFICANT CHANGE UP (ref 0–0)
NRBC BLD-RTO: 0 /100 WBCS — SIGNIFICANT CHANGE UP (ref 0–0)
PLATELET # BLD AUTO: 158 K/UL — SIGNIFICANT CHANGE UP (ref 150–400)
POTASSIUM SERPL-MCNC: 3.3 MMOL/L — LOW (ref 3.5–5.3)
POTASSIUM SERPL-SCNC: 3.3 MMOL/L — LOW (ref 3.5–5.3)
RBC # BLD: 3.7 M/UL — LOW (ref 4.2–5.8)
RBC # FLD: 13.4 % — SIGNIFICANT CHANGE UP (ref 10.3–14.5)
SODIUM SERPL-SCNC: 140 MMOL/L — SIGNIFICANT CHANGE UP (ref 135–145)
SPECIMEN SOURCE: SIGNIFICANT CHANGE UP
WBC # BLD: 4.95 K/UL — SIGNIFICANT CHANGE UP (ref 3.8–10.5)
WBC # FLD AUTO: 4.95 K/UL — SIGNIFICANT CHANGE UP (ref 3.8–10.5)

## 2024-06-30 PROCEDURE — 99232 SBSQ HOSP IP/OBS MODERATE 35: CPT

## 2024-06-30 RX ORDER — CLONAZEPAM 0.5 MG/1
1.5 TABLET ORAL AT BEDTIME
Refills: 0 | Status: DISCONTINUED | OUTPATIENT
Start: 2024-06-30 | End: 2024-07-06

## 2024-06-30 RX ADMIN — ATORVASTATIN CALCIUM 10 MILLIGRAM(S): 80 TABLET, FILM COATED ORAL at 21:30

## 2024-06-30 RX ADMIN — Medication 100 MILLIEQUIVALENT(S): at 14:41

## 2024-06-30 RX ADMIN — CLONAZEPAM 1.5 MILLIGRAM(S): 0.5 TABLET ORAL at 21:30

## 2024-06-30 RX ADMIN — Medication 81 MILLIGRAM(S): at 14:41

## 2024-06-30 RX ADMIN — GABAPENTIN 300 MILLIGRAM(S): 400 CAPSULE ORAL at 07:10

## 2024-06-30 RX ADMIN — Medication 40 MILLIGRAM(S): at 17:13

## 2024-06-30 RX ADMIN — Medication 100 MILLIEQUIVALENT(S): at 16:15

## 2024-06-30 RX ADMIN — GABAPENTIN 300 MILLIGRAM(S): 400 CAPSULE ORAL at 21:29

## 2024-06-30 RX ADMIN — Medication 40 MILLIGRAM(S): at 07:11

## 2024-06-30 RX ADMIN — GABAPENTIN 300 MILLIGRAM(S): 400 CAPSULE ORAL at 14:39

## 2024-06-30 RX ADMIN — Medication 100 MILLIEQUIVALENT(S): at 17:13

## 2024-06-30 RX ADMIN — ENOXAPARIN SODIUM 40 MILLIGRAM(S): 100 INJECTION SUBCUTANEOUS at 17:13

## 2024-07-01 PROCEDURE — 99232 SBSQ HOSP IP/OBS MODERATE 35: CPT

## 2024-07-01 PROCEDURE — 99233 SBSQ HOSP IP/OBS HIGH 50: CPT

## 2024-07-01 RX ADMIN — ENOXAPARIN SODIUM 40 MILLIGRAM(S): 100 INJECTION SUBCUTANEOUS at 18:31

## 2024-07-01 RX ADMIN — Medication 40 MILLIGRAM(S): at 05:50

## 2024-07-01 RX ADMIN — GABAPENTIN 300 MILLIGRAM(S): 400 CAPSULE ORAL at 13:51

## 2024-07-01 RX ADMIN — GABAPENTIN 300 MILLIGRAM(S): 400 CAPSULE ORAL at 22:52

## 2024-07-01 RX ADMIN — Medication 40 MILLIGRAM(S): at 18:31

## 2024-07-01 RX ADMIN — GABAPENTIN 300 MILLIGRAM(S): 400 CAPSULE ORAL at 05:49

## 2024-07-01 RX ADMIN — ATORVASTATIN CALCIUM 10 MILLIGRAM(S): 80 TABLET, FILM COATED ORAL at 22:52

## 2024-07-01 RX ADMIN — CLONAZEPAM 1.5 MILLIGRAM(S): 0.5 TABLET ORAL at 22:52

## 2024-07-01 RX ADMIN — Medication 81 MILLIGRAM(S): at 13:51

## 2024-07-02 LAB
ANION GAP SERPL CALC-SCNC: 4 MMOL/L — LOW (ref 5–17)
BUN SERPL-MCNC: 18 MG/DL — SIGNIFICANT CHANGE UP (ref 7–23)
CALCIUM SERPL-MCNC: 9.1 MG/DL — SIGNIFICANT CHANGE UP (ref 8.5–10.1)
CHLORIDE SERPL-SCNC: 105 MMOL/L — SIGNIFICANT CHANGE UP (ref 96–108)
CO2 SERPL-SCNC: 30 MMOL/L — SIGNIFICANT CHANGE UP (ref 22–31)
CREAT SERPL-MCNC: 0.9 MG/DL — SIGNIFICANT CHANGE UP (ref 0.5–1.3)
EGFR: 86 ML/MIN/1.73M2 — SIGNIFICANT CHANGE UP
EGFR: 86 ML/MIN/1.73M2 — SIGNIFICANT CHANGE UP
GLUCOSE SERPL-MCNC: 95 MG/DL — SIGNIFICANT CHANGE UP (ref 70–99)
POTASSIUM SERPL-MCNC: 4 MMOL/L — SIGNIFICANT CHANGE UP (ref 3.5–5.3)
POTASSIUM SERPL-SCNC: 4 MMOL/L — SIGNIFICANT CHANGE UP (ref 3.5–5.3)
SODIUM SERPL-SCNC: 139 MMOL/L — SIGNIFICANT CHANGE UP (ref 135–145)

## 2024-07-02 PROCEDURE — 99232 SBSQ HOSP IP/OBS MODERATE 35: CPT

## 2024-07-02 RX ADMIN — GABAPENTIN 300 MILLIGRAM(S): 400 CAPSULE ORAL at 05:44

## 2024-07-02 RX ADMIN — GABAPENTIN 300 MILLIGRAM(S): 400 CAPSULE ORAL at 17:35

## 2024-07-02 RX ADMIN — ATORVASTATIN CALCIUM 10 MILLIGRAM(S): 80 TABLET, FILM COATED ORAL at 23:13

## 2024-07-02 RX ADMIN — ENOXAPARIN SODIUM 40 MILLIGRAM(S): 100 INJECTION SUBCUTANEOUS at 17:36

## 2024-07-02 RX ADMIN — Medication 20 MILLIGRAM(S): at 17:36

## 2024-07-02 RX ADMIN — CLONAZEPAM 1.5 MILLIGRAM(S): 0.5 TABLET ORAL at 23:13

## 2024-07-02 RX ADMIN — Medication 40 MILLIGRAM(S): at 05:44

## 2024-07-02 RX ADMIN — GABAPENTIN 300 MILLIGRAM(S): 400 CAPSULE ORAL at 23:13

## 2024-07-02 RX ADMIN — Medication 40 MILLIGRAM(S): at 17:34

## 2024-07-02 RX ADMIN — Medication 81 MILLIGRAM(S): at 17:36

## 2024-07-03 PROCEDURE — 99232 SBSQ HOSP IP/OBS MODERATE 35: CPT

## 2024-07-03 RX ORDER — POLYETHYLENE GLYCOL 3350 17 G/17G
17 POWDER, FOR SOLUTION ORAL ONCE
Refills: 0 | Status: COMPLETED | OUTPATIENT
Start: 2024-07-03 | End: 2024-07-03

## 2024-07-03 RX ADMIN — Medication 20 MILLIGRAM(S): at 11:44

## 2024-07-03 RX ADMIN — ATORVASTATIN CALCIUM 10 MILLIGRAM(S): 80 TABLET, FILM COATED ORAL at 23:24

## 2024-07-03 RX ADMIN — Medication 81 MILLIGRAM(S): at 11:45

## 2024-07-03 RX ADMIN — Medication 40 MILLIGRAM(S): at 06:57

## 2024-07-03 RX ADMIN — POLYETHYLENE GLYCOL 3350 17 GRAM(S): 17 POWDER, FOR SOLUTION ORAL at 23:22

## 2024-07-03 RX ADMIN — CLONAZEPAM 1.5 MILLIGRAM(S): 0.5 TABLET ORAL at 23:23

## 2024-07-03 RX ADMIN — Medication 40 MILLIGRAM(S): at 18:08

## 2024-07-03 RX ADMIN — GABAPENTIN 300 MILLIGRAM(S): 400 CAPSULE ORAL at 07:00

## 2024-07-03 RX ADMIN — ENOXAPARIN SODIUM 40 MILLIGRAM(S): 100 INJECTION SUBCUTANEOUS at 18:06

## 2024-07-03 RX ADMIN — GABAPENTIN 300 MILLIGRAM(S): 400 CAPSULE ORAL at 23:22

## 2024-07-04 PROCEDURE — 99232 SBSQ HOSP IP/OBS MODERATE 35: CPT

## 2024-07-04 PROCEDURE — 99233 SBSQ HOSP IP/OBS HIGH 50: CPT

## 2024-07-04 RX ORDER — BISACODYL 5 MG
10 TABLET, DELAYED RELEASE (ENTERIC COATED) ORAL ONCE
Refills: 0 | Status: COMPLETED | OUTPATIENT
Start: 2024-07-04 | End: 2024-07-04

## 2024-07-04 RX ORDER — POLYETHYLENE GLYCOL 3350 17 G/17G
17 POWDER, FOR SOLUTION ORAL ONCE
Refills: 0 | Status: COMPLETED | OUTPATIENT
Start: 2024-07-04 | End: 2024-07-04

## 2024-07-04 RX ORDER — SENNA 187 MG
2 TABLET ORAL AT BEDTIME
Refills: 0 | Status: DISCONTINUED | OUTPATIENT
Start: 2024-07-04 | End: 2024-07-06

## 2024-07-04 RX ADMIN — Medication 81 MILLIGRAM(S): at 11:27

## 2024-07-04 RX ADMIN — ATORVASTATIN CALCIUM 10 MILLIGRAM(S): 80 TABLET, FILM COATED ORAL at 22:02

## 2024-07-04 RX ADMIN — Medication 10 MILLIGRAM(S): at 17:40

## 2024-07-04 RX ADMIN — Medication 40 MILLIGRAM(S): at 17:40

## 2024-07-04 RX ADMIN — GABAPENTIN 300 MILLIGRAM(S): 400 CAPSULE ORAL at 06:10

## 2024-07-04 RX ADMIN — POLYETHYLENE GLYCOL 3350 17 GRAM(S): 17 POWDER, FOR SOLUTION ORAL at 22:02

## 2024-07-04 RX ADMIN — ENOXAPARIN SODIUM 40 MILLIGRAM(S): 100 INJECTION SUBCUTANEOUS at 17:40

## 2024-07-04 RX ADMIN — Medication 2 TABLET(S): at 22:01

## 2024-07-04 RX ADMIN — GABAPENTIN 300 MILLIGRAM(S): 400 CAPSULE ORAL at 22:02

## 2024-07-04 RX ADMIN — GABAPENTIN 300 MILLIGRAM(S): 400 CAPSULE ORAL at 14:36

## 2024-07-04 RX ADMIN — CLONAZEPAM 1.5 MILLIGRAM(S): 0.5 TABLET ORAL at 22:02

## 2024-07-04 RX ADMIN — Medication 20 MILLIGRAM(S): at 11:27

## 2024-07-04 RX ADMIN — Medication 3 MILLIGRAM(S): at 23:13

## 2024-07-04 RX ADMIN — Medication 40 MILLIGRAM(S): at 06:10

## 2024-07-05 ENCOUNTER — TRANSCRIPTION ENCOUNTER (OUTPATIENT)
Age: 79
End: 2024-07-05

## 2024-07-05 PROCEDURE — 99232 SBSQ HOSP IP/OBS MODERATE 35: CPT

## 2024-07-05 PROCEDURE — 88312 SPECIAL STAINS GROUP 1: CPT | Mod: 26

## 2024-07-05 PROCEDURE — 88313 SPECIAL STAINS GROUP 2: CPT | Mod: 26

## 2024-07-05 PROCEDURE — 88305 TISSUE EXAM BY PATHOLOGIST: CPT | Mod: 26

## 2024-07-05 RX ADMIN — GABAPENTIN 300 MILLIGRAM(S): 400 CAPSULE ORAL at 23:04

## 2024-07-05 RX ADMIN — Medication 20 MILLIGRAM(S): at 20:07

## 2024-07-05 RX ADMIN — Medication 40 MILLIGRAM(S): at 20:07

## 2024-07-05 RX ADMIN — CLONAZEPAM 1.5 MILLIGRAM(S): 0.5 TABLET ORAL at 23:04

## 2024-07-05 RX ADMIN — ENOXAPARIN SODIUM 40 MILLIGRAM(S): 100 INJECTION SUBCUTANEOUS at 20:07

## 2024-07-05 RX ADMIN — ATORVASTATIN CALCIUM 10 MILLIGRAM(S): 80 TABLET, FILM COATED ORAL at 23:04

## 2024-07-05 RX ADMIN — Medication 81 MILLIGRAM(S): at 20:08

## 2024-07-05 RX ADMIN — GABAPENTIN 300 MILLIGRAM(S): 400 CAPSULE ORAL at 05:44

## 2024-07-05 RX ADMIN — Medication 40 MILLIGRAM(S): at 05:45

## 2024-07-06 ENCOUNTER — TRANSCRIPTION ENCOUNTER (OUTPATIENT)
Age: 79
End: 2024-07-06

## 2024-07-06 VITALS
RESPIRATION RATE: 18 BRPM | TEMPERATURE: 98 F | SYSTOLIC BLOOD PRESSURE: 102 MMHG | OXYGEN SATURATION: 91 % | DIASTOLIC BLOOD PRESSURE: 57 MMHG | HEART RATE: 60 BPM

## 2024-07-06 LAB
ALBUMIN SERPL ELPH-MCNC: 2.7 G/DL — LOW (ref 3.3–5)
ALP SERPL-CCNC: 82 U/L — SIGNIFICANT CHANGE UP (ref 40–120)
ALT FLD-CCNC: 25 U/L — SIGNIFICANT CHANGE UP (ref 12–78)
ANION GAP SERPL CALC-SCNC: 8 MMOL/L — SIGNIFICANT CHANGE UP (ref 5–17)
AST SERPL-CCNC: 24 U/L — SIGNIFICANT CHANGE UP (ref 15–37)
BILIRUB SERPL-MCNC: 1 MG/DL — SIGNIFICANT CHANGE UP (ref 0.2–1.2)
BUN SERPL-MCNC: 24 MG/DL — HIGH (ref 7–23)
CALCIUM SERPL-MCNC: 9.4 MG/DL — SIGNIFICANT CHANGE UP (ref 8.5–10.1)
CHLORIDE SERPL-SCNC: 103 MMOL/L — SIGNIFICANT CHANGE UP (ref 96–108)
CO2 SERPL-SCNC: 28 MMOL/L — SIGNIFICANT CHANGE UP (ref 22–31)
CREAT SERPL-MCNC: 0.97 MG/DL — SIGNIFICANT CHANGE UP (ref 0.5–1.3)
EGFR: 79 ML/MIN/1.73M2 — SIGNIFICANT CHANGE UP
EGFR: 79 ML/MIN/1.73M2 — SIGNIFICANT CHANGE UP
GLUCOSE SERPL-MCNC: 69 MG/DL — LOW (ref 70–99)
HCT VFR BLD CALC: 40.1 % — SIGNIFICANT CHANGE UP (ref 39–50)
HGB BLD-MCNC: 13.3 G/DL — SIGNIFICANT CHANGE UP (ref 13–17)
MCHC RBC-ENTMCNC: 33.2 GM/DL — SIGNIFICANT CHANGE UP (ref 32–36)
MCHC RBC-ENTMCNC: 33.5 PG — SIGNIFICANT CHANGE UP (ref 27–34)
MCV RBC AUTO: 101 FL — HIGH (ref 80–100)
NRBC # BLD: 0 /100 WBCS — SIGNIFICANT CHANGE UP (ref 0–0)
NRBC BLD-RTO: 0 /100 WBCS — SIGNIFICANT CHANGE UP (ref 0–0)
PLATELET # BLD AUTO: 203 K/UL — SIGNIFICANT CHANGE UP (ref 150–400)
POTASSIUM SERPL-MCNC: 4 MMOL/L — SIGNIFICANT CHANGE UP (ref 3.5–5.3)
POTASSIUM SERPL-SCNC: 4 MMOL/L — SIGNIFICANT CHANGE UP (ref 3.5–5.3)
PROT SERPL-MCNC: 6.1 G/DL — SIGNIFICANT CHANGE UP (ref 6–8.3)
RBC # BLD: 3.97 M/UL — LOW (ref 4.2–5.8)
RBC # FLD: 13.8 % — SIGNIFICANT CHANGE UP (ref 10.3–14.5)
SODIUM SERPL-SCNC: 139 MMOL/L — SIGNIFICANT CHANGE UP (ref 135–145)
WBC # BLD: 5.39 K/UL — SIGNIFICANT CHANGE UP (ref 3.8–10.5)
WBC # FLD AUTO: 5.39 K/UL — SIGNIFICANT CHANGE UP (ref 3.8–10.5)

## 2024-07-06 PROCEDURE — 80048 BASIC METABOLIC PNL TOTAL CA: CPT

## 2024-07-06 PROCEDURE — 74177 CT ABD & PELVIS W/CONTRAST: CPT | Mod: MC

## 2024-07-06 PROCEDURE — 85610 PROTHROMBIN TIME: CPT

## 2024-07-06 PROCEDURE — 84145 PROCALCITONIN (PCT): CPT

## 2024-07-06 PROCEDURE — 71260 CT THORAX DX C+: CPT | Mod: MC

## 2024-07-06 PROCEDURE — 83735 ASSAY OF MAGNESIUM: CPT

## 2024-07-06 PROCEDURE — 88313 SPECIAL STAINS GROUP 2: CPT

## 2024-07-06 PROCEDURE — 80061 LIPID PANEL: CPT

## 2024-07-06 PROCEDURE — 82607 VITAMIN B-12: CPT

## 2024-07-06 PROCEDURE — 87637 SARSCOV2&INF A&B&RSV AMP PRB: CPT

## 2024-07-06 PROCEDURE — 97110 THERAPEUTIC EXERCISES: CPT

## 2024-07-06 PROCEDURE — 71045 X-RAY EXAM CHEST 1 VIEW: CPT

## 2024-07-06 PROCEDURE — 88312 SPECIAL STAINS GROUP 1: CPT

## 2024-07-06 PROCEDURE — 97163 PT EVAL HIGH COMPLEX 45 MIN: CPT

## 2024-07-06 PROCEDURE — 80053 COMPREHEN METABOLIC PANEL: CPT

## 2024-07-06 PROCEDURE — 88305 TISSUE EXAM BY PATHOLOGIST: CPT

## 2024-07-06 PROCEDURE — 85025 COMPLETE CBC W/AUTO DIFF WBC: CPT

## 2024-07-06 PROCEDURE — 84100 ASSAY OF PHOSPHORUS: CPT

## 2024-07-06 PROCEDURE — 82746 ASSAY OF FOLIC ACID SERUM: CPT

## 2024-07-06 PROCEDURE — 80076 HEPATIC FUNCTION PANEL: CPT

## 2024-07-06 PROCEDURE — 85379 FIBRIN DEGRADATION QUANT: CPT

## 2024-07-06 PROCEDURE — 93005 ELECTROCARDIOGRAM TRACING: CPT

## 2024-07-06 PROCEDURE — 99239 HOSP IP/OBS DSCHRG MGMT >30: CPT

## 2024-07-06 PROCEDURE — 81001 URINALYSIS AUTO W/SCOPE: CPT

## 2024-07-06 PROCEDURE — 87040 BLOOD CULTURE FOR BACTERIA: CPT

## 2024-07-06 PROCEDURE — 82310 ASSAY OF CALCIUM: CPT

## 2024-07-06 PROCEDURE — 36415 COLL VENOUS BLD VENIPUNCTURE: CPT

## 2024-07-06 PROCEDURE — 92610 EVALUATE SWALLOWING FUNCTION: CPT

## 2024-07-06 PROCEDURE — 83690 ASSAY OF LIPASE: CPT

## 2024-07-06 PROCEDURE — 85027 COMPLETE CBC AUTOMATED: CPT

## 2024-07-06 PROCEDURE — 86140 C-REACTIVE PROTEIN: CPT

## 2024-07-06 PROCEDURE — 99285 EMERGENCY DEPT VISIT HI MDM: CPT | Mod: 25

## 2024-07-06 PROCEDURE — 85730 THROMBOPLASTIN TIME PARTIAL: CPT

## 2024-07-06 PROCEDURE — 83970 ASSAY OF PARATHORMONE: CPT

## 2024-07-06 PROCEDURE — 97112 NEUROMUSCULAR REEDUCATION: CPT

## 2024-07-06 PROCEDURE — 87086 URINE CULTURE/COLONY COUNT: CPT

## 2024-07-06 RX ORDER — CLONAZEPAM 0.5 MG/1
3 TABLET ORAL
Qty: 0 | Refills: 0 | DISCHARGE
Start: 2024-07-06

## 2024-07-06 RX ORDER — ASPIRIN 325 MG
1 TABLET ORAL
Refills: 0 | DISCHARGE

## 2024-07-06 RX ORDER — ATORVASTATIN CALCIUM 80 MG/1
1 TABLET, FILM COATED ORAL
Qty: 0 | Refills: 0 | DISCHARGE
Start: 2024-07-06

## 2024-07-06 RX ORDER — GABAPENTIN 400 MG/1
1 CAPSULE ORAL
Qty: 0 | Refills: 0 | DISCHARGE
Start: 2024-07-06

## 2024-07-06 RX ORDER — CLONAZEPAM 0.5 MG/1
1.5 TABLET ORAL
Refills: 0 | DISCHARGE

## 2024-07-06 RX ORDER — ASPIRIN 325 MG
1 TABLET ORAL
Qty: 0 | Refills: 0 | DISCHARGE
Start: 2024-07-06

## 2024-07-06 RX ADMIN — Medication 20 MILLIGRAM(S): at 11:59

## 2024-07-06 RX ADMIN — GABAPENTIN 300 MILLIGRAM(S): 400 CAPSULE ORAL at 05:32

## 2024-07-06 RX ADMIN — Medication 81 MILLIGRAM(S): at 11:59

## 2024-07-06 RX ADMIN — Medication 40 MILLIGRAM(S): at 05:32

## 2024-07-29 NOTE — ED ADULT NURSE NOTE - CAS EDP DISCH DISPOSITION ADMI
----- Message from Abdiaziz Cristobal sent at 7/29/2024 11:51 AM EDT -----  Regarding: [facility] Appointment Request  [facility] Appointment Request    Patient needs appointment for [facility] Appointment Type: Hospital Follow Up.    Patient Requested Dates(s):SOONEST  Patient Requested Time:ANY  Provider Name:MARY TROY    Reason for Appointment Request: Other HOSPITAL  --------------------------------------------------------------------------------------------------------------------------    Relationship to Patient: Guardian MIYA DOWLING     Call Back Information: OK to leave message on voicemail  Preferred Call Back Number: Phone [phone]           
Telemetry

## 2024-09-18 RX ORDER — ATORVASTATIN CALCIUM 80 MG/1
1 TABLET, FILM COATED ORAL
Qty: 30 | Refills: 3
Start: 2024-09-18 | End: 2025-01-15

## 2024-09-18 RX ORDER — SUCRALFATE 1 G
1 TABLET ORAL
Qty: 30 | Refills: 3
Start: 2024-09-18 | End: 2025-01-15

## 2024-09-18 RX ORDER — CLONAZEPAM 0.5 MG/1
3 TABLET ORAL
Qty: 45 | Refills: 0
Start: 2024-09-18 | End: 2024-10-02

## 2024-09-18 RX ORDER — GABAPENTIN 400 MG/1
1 CAPSULE ORAL
Qty: 90 | Refills: 3
Start: 2024-09-18 | End: 2025-01-15

## 2024-09-21 ENCOUNTER — EMERGENCY (EMERGENCY)
Facility: HOSPITAL | Age: 79
LOS: 1 days | Discharge: HOME | End: 2024-09-21
Attending: STUDENT IN AN ORGANIZED HEALTH CARE EDUCATION/TRAINING PROGRAM | Admitting: STUDENT IN AN ORGANIZED HEALTH CARE EDUCATION/TRAINING PROGRAM
Payer: MEDICARE

## 2024-09-21 VITALS
HEIGHT: 73 IN | WEIGHT: 169.98 LBS | HEART RATE: 60 BPM | DIASTOLIC BLOOD PRESSURE: 60 MMHG | TEMPERATURE: 97 F | OXYGEN SATURATION: 94 % | SYSTOLIC BLOOD PRESSURE: 110 MMHG | RESPIRATION RATE: 16 BRPM

## 2024-09-21 VITALS
OXYGEN SATURATION: 98 % | HEART RATE: 62 BPM | RESPIRATION RATE: 18 BRPM | TEMPERATURE: 98 F | SYSTOLIC BLOOD PRESSURE: 115 MMHG | DIASTOLIC BLOOD PRESSURE: 65 MMHG

## 2024-09-21 DIAGNOSIS — Z95.0 PRESENCE OF CARDIAC PACEMAKER: Chronic | ICD-10-CM

## 2024-09-21 DIAGNOSIS — Z98.41 CATARACT EXTRACTION STATUS, RIGHT EYE: Chronic | ICD-10-CM

## 2024-09-21 DIAGNOSIS — Z98.890 OTHER SPECIFIED POSTPROCEDURAL STATES: Chronic | ICD-10-CM

## 2024-09-21 DIAGNOSIS — Z98.1 ARTHRODESIS STATUS: Chronic | ICD-10-CM

## 2024-09-21 DIAGNOSIS — Z95.1 PRESENCE OF AORTOCORONARY BYPASS GRAFT: Chronic | ICD-10-CM

## 2024-09-21 PROBLEM — I63.9 CEREBRAL INFARCTION, UNSPECIFIED: Chronic | Status: ACTIVE | Noted: 2024-06-23

## 2024-09-21 LAB
ALBUMIN SERPL ELPH-MCNC: 3.5 G/DL — SIGNIFICANT CHANGE UP (ref 3.3–5)
ALP SERPL-CCNC: 100 U/L — SIGNIFICANT CHANGE UP (ref 40–120)
ALT FLD-CCNC: 19 U/L — SIGNIFICANT CHANGE UP (ref 12–78)
ANION GAP SERPL CALC-SCNC: 4 MMOL/L — LOW (ref 5–17)
APPEARANCE UR: CLEAR — SIGNIFICANT CHANGE UP
AST SERPL-CCNC: 20 U/L — SIGNIFICANT CHANGE UP (ref 15–37)
BASOPHILS # BLD AUTO: 0.02 K/UL — SIGNIFICANT CHANGE UP (ref 0–0.2)
BASOPHILS NFR BLD AUTO: 0.3 % — SIGNIFICANT CHANGE UP (ref 0–2)
BILIRUB SERPL-MCNC: 1.1 MG/DL — SIGNIFICANT CHANGE UP (ref 0.2–1.2)
BILIRUB UR-MCNC: NEGATIVE — SIGNIFICANT CHANGE UP
BUN SERPL-MCNC: 24 MG/DL — HIGH (ref 7–23)
CALCIUM SERPL-MCNC: 9.4 MG/DL — SIGNIFICANT CHANGE UP (ref 8.5–10.1)
CHLORIDE SERPL-SCNC: 105 MMOL/L — SIGNIFICANT CHANGE UP (ref 96–108)
CO2 SERPL-SCNC: 30 MMOL/L — SIGNIFICANT CHANGE UP (ref 22–31)
COLOR SPEC: YELLOW — SIGNIFICANT CHANGE UP
CREAT SERPL-MCNC: 1 MG/DL — SIGNIFICANT CHANGE UP (ref 0.5–1.3)
DIFF PNL FLD: NEGATIVE — SIGNIFICANT CHANGE UP
EGFR: 76 ML/MIN/1.73M2 — SIGNIFICANT CHANGE UP
EOSINOPHIL # BLD AUTO: 0.51 K/UL — HIGH (ref 0–0.5)
EOSINOPHIL NFR BLD AUTO: 8.9 % — HIGH (ref 0–6)
FLUAV AG NPH QL: SIGNIFICANT CHANGE UP
FLUBV AG NPH QL: SIGNIFICANT CHANGE UP
GLUCOSE SERPL-MCNC: 173 MG/DL — HIGH (ref 70–99)
GLUCOSE UR QL: NEGATIVE MG/DL — SIGNIFICANT CHANGE UP
HCT VFR BLD CALC: 38.8 % — LOW (ref 39–50)
HGB BLD-MCNC: 12.7 G/DL — LOW (ref 13–17)
IMM GRANULOCYTES NFR BLD AUTO: 0.3 % — SIGNIFICANT CHANGE UP (ref 0–0.9)
KETONES UR-MCNC: NEGATIVE MG/DL — SIGNIFICANT CHANGE UP
LEUKOCYTE ESTERASE UR-ACNC: ABNORMAL
LYMPHOCYTES # BLD AUTO: 0.56 K/UL — LOW (ref 1–3.3)
LYMPHOCYTES # BLD AUTO: 9.8 % — LOW (ref 13–44)
MAGNESIUM SERPL-MCNC: 2.4 MG/DL — SIGNIFICANT CHANGE UP (ref 1.6–2.6)
MCHC RBC-ENTMCNC: 32.7 GM/DL — SIGNIFICANT CHANGE UP (ref 32–36)
MCHC RBC-ENTMCNC: 33.2 PG — SIGNIFICANT CHANGE UP (ref 27–34)
MCV RBC AUTO: 101.3 FL — HIGH (ref 80–100)
MONOCYTES # BLD AUTO: 0.57 K/UL — SIGNIFICANT CHANGE UP (ref 0–0.9)
MONOCYTES NFR BLD AUTO: 9.9 % — SIGNIFICANT CHANGE UP (ref 2–14)
NEUTROPHILS # BLD AUTO: 4.05 K/UL — SIGNIFICANT CHANGE UP (ref 1.8–7.4)
NEUTROPHILS NFR BLD AUTO: 70.8 % — SIGNIFICANT CHANGE UP (ref 43–77)
NITRITE UR-MCNC: NEGATIVE — SIGNIFICANT CHANGE UP
NRBC # BLD: 0 /100 WBCS — SIGNIFICANT CHANGE UP (ref 0–0)
PH UR: 5.5 — SIGNIFICANT CHANGE UP (ref 5–8)
PHOSPHATE SERPL-MCNC: 3 MG/DL — SIGNIFICANT CHANGE UP (ref 2.5–4.5)
PLATELET # BLD AUTO: 134 K/UL — LOW (ref 150–400)
POTASSIUM SERPL-MCNC: 3.7 MMOL/L — SIGNIFICANT CHANGE UP (ref 3.5–5.3)
POTASSIUM SERPL-SCNC: 3.7 MMOL/L — SIGNIFICANT CHANGE UP (ref 3.5–5.3)
PROT SERPL-MCNC: 6.5 G/DL — SIGNIFICANT CHANGE UP (ref 6–8.3)
PROT UR-MCNC: NEGATIVE MG/DL — SIGNIFICANT CHANGE UP
RBC # BLD: 3.83 M/UL — LOW (ref 4.2–5.8)
RBC # FLD: 13.5 % — SIGNIFICANT CHANGE UP (ref 10.3–14.5)
RSV RNA NPH QL NAA+NON-PROBE: SIGNIFICANT CHANGE UP
SARS-COV-2 RNA SPEC QL NAA+PROBE: SIGNIFICANT CHANGE UP
SODIUM SERPL-SCNC: 139 MMOL/L — SIGNIFICANT CHANGE UP (ref 135–145)
SP GR SPEC: 1.01 — SIGNIFICANT CHANGE UP (ref 1–1.03)
TROPONIN I, HIGH SENSITIVITY RESULT: 17.9 NG/L — SIGNIFICANT CHANGE UP
TROPONIN I, HIGH SENSITIVITY RESULT: 19.1 NG/L — SIGNIFICANT CHANGE UP
UROBILINOGEN FLD QL: 1 MG/DL — SIGNIFICANT CHANGE UP (ref 0.2–1)
WBC # BLD: 5.73 K/UL — SIGNIFICANT CHANGE UP (ref 3.8–10.5)
WBC # FLD AUTO: 5.73 K/UL — SIGNIFICANT CHANGE UP (ref 3.8–10.5)

## 2024-09-21 PROCEDURE — 71045 X-RAY EXAM CHEST 1 VIEW: CPT | Mod: 26

## 2024-09-21 PROCEDURE — 87637 SARSCOV2&INF A&B&RSV AMP PRB: CPT

## 2024-09-21 PROCEDURE — 99285 EMERGENCY DEPT VISIT HI MDM: CPT

## 2024-09-21 PROCEDURE — 84100 ASSAY OF PHOSPHORUS: CPT

## 2024-09-21 PROCEDURE — 83735 ASSAY OF MAGNESIUM: CPT

## 2024-09-21 PROCEDURE — 71045 X-RAY EXAM CHEST 1 VIEW: CPT

## 2024-09-21 PROCEDURE — 85025 COMPLETE CBC W/AUTO DIFF WBC: CPT

## 2024-09-21 PROCEDURE — 87086 URINE CULTURE/COLONY COUNT: CPT

## 2024-09-21 PROCEDURE — 80053 COMPREHEN METABOLIC PANEL: CPT

## 2024-09-21 PROCEDURE — 97162 PT EVAL MOD COMPLEX 30 MIN: CPT

## 2024-09-21 PROCEDURE — 36415 COLL VENOUS BLD VENIPUNCTURE: CPT

## 2024-09-21 PROCEDURE — 93005 ELECTROCARDIOGRAM TRACING: CPT

## 2024-09-21 PROCEDURE — 96360 HYDRATION IV INFUSION INIT: CPT | Mod: XU

## 2024-09-21 PROCEDURE — 81001 URINALYSIS AUTO W/SCOPE: CPT

## 2024-09-21 PROCEDURE — 51702 INSERT TEMP BLADDER CATH: CPT

## 2024-09-21 PROCEDURE — 93010 ELECTROCARDIOGRAM REPORT: CPT

## 2024-09-21 PROCEDURE — 84484 ASSAY OF TROPONIN QUANT: CPT

## 2024-09-21 PROCEDURE — 99285 EMERGENCY DEPT VISIT HI MDM: CPT | Mod: 25

## 2024-09-21 RX ORDER — SODIUM CHLORIDE 9 MG/ML
1000 INJECTION INTRAMUSCULAR; INTRAVENOUS; SUBCUTANEOUS ONCE
Refills: 0 | Status: COMPLETED | OUTPATIENT
Start: 2024-09-21 | End: 2024-09-21

## 2024-09-21 RX ADMIN — SODIUM CHLORIDE 1000 MILLILITER(S): 9 INJECTION INTRAMUSCULAR; INTRAVENOUS; SUBCUTANEOUS at 12:30

## 2024-09-21 RX ADMIN — SODIUM CHLORIDE 2000 MILLILITER(S): 9 INJECTION INTRAMUSCULAR; INTRAVENOUS; SUBCUTANEOUS at 11:40

## 2024-09-21 NOTE — ED ADULT NURSE NOTE - CHIEF COMPLAINT QUOTE
78y M [facility] from [facility] (living there from [place])... pt wheelchair bound and with left sided lean at baseline from spinal conditions... pt states that he was using his mobile chair, then noticed his torso leaning forward and he didn't have strength to lean back.. started at 9am .. no other acute neuro deficits noted in stretcher in [facility]

## 2024-09-21 NOTE — PHYSICAL THERAPY INITIAL EVALUATION ADULT - RANGE OF MOTION EXAMINATION, REHAB EVAL
B LE limited in AROM with R LE < 25% full functional active range and L LE <50% full active functional range/bilateral upper extremity ROM was WFL (within functional limits)

## 2024-09-21 NOTE — PHYSICAL THERAPY INITIAL EVALUATION ADULT - PATIENT/FAMILY AGREES WITH PLAN
TBD; pt states he prefers to go home with a home health aide, at this time the patient's HHA is scheduled for 7 days per week from 9 to 5:30pm. CM made aware of PT rec.

## 2024-09-21 NOTE — ED ADULT NURSE NOTE - ED STAT RN HANDOFF DETAILS
discharge instructions explained and given to patient.  patient safely wheelchaire out from unit to his son.

## 2024-09-21 NOTE — ED PROVIDER NOTE - PHYSICAL EXAMINATION
General: well appearing, no acute distress, appropriate weight  HENT:  Head: normocephalic, atraumatic.   Ears: canal clear, TM intact with good light reflex  Eyes: EOMI, PERRLA, no nystagmus  Oropharynx: mucosa pink, dry no lesions, uvula midline.    Cardiac: heart RRR, no murmurs, rubs, gallops, pulses 2+ x4  Pulm: lungs CTA  GI: abdomen soft, nontender  Neuro: A&Ox3, CN2-12 intact, sensation intact, strength 5/5 in BL UE. strength is 4/5 on the LLE nd 2/5 on the RLE  Skin: warm, dry, no rash, laceration, abrasion, contusion

## 2024-09-21 NOTE — ED ADULT NURSE NOTE - OBJECTIVE STATEMENT
patient presents to ED with weakness that started today.  patient states he is wheelchair bound but today is feeling weak and was not able to adjust himself in his WC.

## 2024-09-21 NOTE — ED PROVIDER NOTE - PROGRESS NOTE DETAILS
labs and imaging unremarkable. patient feeling well. would like to go home. strength improved. does not want rehab. will dc patient home with follow up and return precautions. family at bedside to take patient home labs and imaging unremarkable. serial trop neg. serial EKG no sgarbossa. patient feeling well. would like to go home. strength improved. does not want rehab. will dc patient home with follow up and return precautions. family at bedside to take patient home

## 2024-09-21 NOTE — CARE COORDINATION ASSESSMENT. - NSDCPLANSERVICES_GEN_ALL_CORE
PT said possible [facility] but patient wants to go home, has aide 7 days and is waiting for PT/OT to start./Anticipated Needs Unclear at Present

## 2024-09-21 NOTE — PHYSICAL THERAPY INITIAL EVALUATION ADULT - BED MOBILITY LIMITATIONS, REHAB EVAL
deferred 2/2 fatigue and weakness of B LE; pt able to transfer semi-reclined to long sitting in ED stretcher with B UE support on railings and minAx1 at trunk/decreased ability to use legs for bridging/pushing/impaired ability to control trunk for mobility

## 2024-09-21 NOTE — ED PROVIDER NOTE - NSICDXPASTMEDICALHX_GEN_ALL_CORE_FT
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

## 2024-09-21 NOTE — ED ADULT NURSE NOTE - NSFALLRISKINTERV_ED_ALL_ED

## 2024-09-21 NOTE — CARE COORDINATION ASSESSMENT. - OTHER PERTINENT DISCHARGE PLANNING INFORMATION:
PT said may need sub-acute. Chart reviewed and I sent this patient to [facility] in July. Confirmed with [facility] that he was there from 7/6 to 9/18. Patient states at baseline he is wheelchair bound and needs assist of 1 person to transfer. States he uses urinal when his aide is gone. He has aide from [facility] at [facility] 7 days per week from 9AM to 5:30PM. He has sons in [place] and [place]. Confirmed he has a [employer] system and states when he pushes it they respond within 5 to 10 minutes. He does not want to return to rehab and is eager to be discharged home. MD informed.  PT said may need sub-acute. Chart reviewed and I sent this patient to [facility] in July. Confirmed with [facility] that he was there from 7/6 to 9/18. Patient states at baseline he is wheelchair bound and needs assist of 1 person to transfer. States he uses urinal when his aide is gone. He has aide from [facility] at Home 7 days per week from 9AM to 5:30PM. He has sons in [place] and [place]. Confirmed he has a [employer] system and states when he pushes it they respond within 5 to 10 minutes. He does not want to return to rehab and is eager to be discharged home. MD informed. Social work to remain available.

## 2024-09-21 NOTE — PHYSICAL THERAPY INITIAL EVALUATION ADULT - PERTINENT HX OF CURRENT PROBLEM, REHAB EVAL
As per EMR "patient is a 78-year-old male with history of A-fib, BPH, basal cell adenoma, CVA, cerviacl myelopathy, CAD s/p CABG and pacemaker, not MRI compatible who presents to the ED for core weakness. patient was recently admitted for weakness related to COVID, then spent 2 months in rehab, was discharged 2 days ago.  Patient is wheelchair-bound,.  States his current weakness on presentation is at baseline with his typical weakness in his extremities as well as his left lean.  Patient became concerned today when he was in his wheelchair and did not have core strength to lift his torso up and was Leaning forward. denies fever, chills, CP, sob, abdominal pain, n/v, new numbness."

## 2024-09-21 NOTE — ED ADULT NURSE NOTE - CAS EDP DISCH TYPE
Prep Survey      Responses   Facility patient discharged from?  [facility]   Is patient eligible?  Yes   Discharge diagnosis  Hypoxia   Does the patient have one of the following disease processes/diagnoses(primary or secondary)?  Other   Does the patient have Home health ordered?  No   Is there a DME ordered?  No   Prep survey completed?  Yes          Karma Lee RN         Home

## 2024-09-21 NOTE — CARE COORDINATION ASSESSMENT. - NSPASTMEDSURGHISTORY_GEN_ALL_CORE_FT
PAST MEDICAL & SURGICAL HISTORY:  Acid reflux      Osteoarthritis      Basal cell adenoma  leg/chest  (removed)      Scoliosis      Lumbar stenosis      Coronary artery disease      S/P CABG x 3  2011      Balance disorder  due to myelopaathy      Cervical myelopathy with cervical radiculopathy      Acute pain of right shoulder      S/P CABG x 3  2011      Cardiac pacemaker in situ  placed 2011      S/P cervical spinal fusion  2000      S/P lumbar laminectomy      S/P shoulder surgery  right arthroscopy      BPH (benign prostatic hyperplasia)      AVNRT (AV gerald re-entry tachycardia)  ablation 2011      Paresis  right sided due to myelopathy      Afib      S/P cataract surgery, right  2015      Cerebrovascular accident (CVA)

## 2024-09-21 NOTE — ED ADULT TRIAGE NOTE - CHIEF COMPLAINT QUOTE
78y M B[facility] from [facility] (living there from [place])... pt wheelchair bound and with left sided lean at baseline from spinal conditions... pt states that he was using his mobile chair, then noticed his torso leaning forward and he didn't have strength to lean back.. started at 9am .. no other acute neuro deficits noted in stretcher in [facility]

## 2024-09-21 NOTE — PHYSICAL THERAPY INITIAL EVALUATION ADULT - NSPTDISCHREC_GEN_A_CORE
recommend [facility] based on functional mobility status; should patient go home, pt would require 24/7 assistance for safe mobility and HCPT;/Sub-acute Rehab

## 2024-09-21 NOTE — ED PROVIDER NOTE - CARE PROVIDER_API CALL
JOSE ANTONIO RIGGINS  [address] [place], [place] [address]  Phone: [phone]  Fax: [phone]  Follow Up Time: 1-3 Days

## 2024-09-21 NOTE — ED PROVIDER NOTE - NSFOLLOWUPINSTRUCTIONS_ED_ALL_ED_FT
Please follow up with you PCP in the next 2 days    Please take medications as prescribed.    Return to the Emergency Department for worsening or persistent symptoms, and/or ANY NEW OR CONCERNING SYMPTOMS. If you have issues obtaining follow up, please call: [phone] or [phone]  to obtain a doctor or specialist who takes your insurance in your area.    Please return to the ED for any new or worsening problems including but not limited to: fever, chills, headache, chest pain, shortness of breath, palpitations, abdominal pain, nausea, vomiting, diarrhea, numbness or weakness.

## 2024-09-21 NOTE — ED PROVIDER NOTE - CLINICAL SUMMARY MEDICAL DECISION MAKING FREE TEXT BOX
patient is a 78-year-old male with history of A-fib, BPH, basal cell adenoma, CVA, cerviacl myelopathy, CAD s/p CABG and pacemaker, not MRI compatible who presents to the ED for core weakness. patient was recently admitted for weakness related to COVID, then spent 2 months in rehab, was discharged 2 days ago.  Patient is wheelchair-bound,.  States his current weakness on presentation is at baseline with his typical weakness in his extremities as well as his left lean.  Patient became concerned today when he was in his wheelchair and did not have core strength to lift his torso up and was Leaning forward. denies fever, chills, CP, sob, abdominal pain, n/v, new numbness.    will obtain labs and reeval.

## 2024-09-21 NOTE — ED PROVIDER NOTE - DIFFERENTIAL DIAGNOSIS
DDx includes but not limited to: Infection VS ACS VS UTI VS electrolyte abnormality VS myelopathy Differential Diagnosis

## 2024-09-21 NOTE — PHYSICAL THERAPY INITIAL EVALUATION ADULT - GENERAL OBSERVATIONS, REHAB EVAL
Patient chart reviewed, events noted. Patient cleared to be seen for therapy by RN prior to session. Patient received semi-reclined in bed +IV +room air, in NAD. Pt agreeable to PT at this time.

## 2024-09-21 NOTE — ED PROVIDER NOTE - PATIENT PORTAL LINK FT
You can access the FollowMyHealth Patient Portal offered by [facility] by registering at the following website: [email or web address]. By joining [employer]’s FollowMyHealth portal, you will also be able to view your health information using other applications (apps) compatible with our system.

## 2024-09-21 NOTE — PHYSICAL THERAPY INITIAL EVALUATION ADULT - BED MOBILITY TRAINING, PT EVAL
Patient will perform repositioning in bed to longsitting with UE support, with supervision to be able to reposition in bed and maintain skin integrity, within 3 to 5 sessions.

## 2024-09-21 NOTE — PHYSICAL THERAPY INITIAL EVALUATION ADULT - NSACTIVITYREC_GEN_A_PT
Recommend 2-person assistance for bed mobility; pending assessment of transfer skills, recommend [facility] equipment for transfers OOB (Areli or Golvo); home exercise program.

## 2024-09-21 NOTE — PHYSICAL THERAPY INITIAL EVALUATION ADULT - ADDITIONAL COMMENTS
Pt states he was in [facility] for about 10 weeks until he left to go home ~2 days ago. Pt has a "scooter"/motorized wheelchair at home. Pt states he has a rolling walker but has not been ambulatory so does not use it. Pt states he transfers to/from bed to scooter or bed to shower chair. Pt stated he had a HHA in place to come 7 days per week from 9am to 5:30pm.

## 2024-09-21 NOTE — CARE COORDINATION ASSESSMENT. - NSCAREPROVIDERS_GEN_ALL_CORE_FT
CARE PROVIDERS:  Access Services: Kerri Rubio  Administration: Sánchez Ramos  Admitting: Doctor, Unknown  Attending: Doctor, Libertad  ED Attending: Haroldo Romero  ED Nurse: Muna Blount  Nurse: Andrew Staton  PCA/Nursing Assistant: Nelly Watt  Physical Therapy: Trudy Muñoz  : Tamara Allison

## 2024-09-22 LAB
CULTURE RESULTS: SIGNIFICANT CHANGE UP
SPECIMEN SOURCE: SIGNIFICANT CHANGE UP

## 2024-10-22 ENCOUNTER — INPATIENT (INPATIENT)
Facility: HOSPITAL | Age: 79
LOS: 6 days | Discharge: NURSING, REHAB OR LONG-TERM CARE | DRG: 179 | End: 2024-10-29
Attending: INTERNAL MEDICINE | Admitting: INTERNAL MEDICINE
Payer: MEDICARE

## 2024-10-22 VITALS
HEIGHT: 72 IN | SYSTOLIC BLOOD PRESSURE: 139 MMHG | TEMPERATURE: 100 F | HEART RATE: 93 BPM | WEIGHT: 169.98 LBS | DIASTOLIC BLOOD PRESSURE: 63 MMHG | OXYGEN SATURATION: 89 % | RESPIRATION RATE: 19 BRPM

## 2024-10-22 DIAGNOSIS — Z95.0 PRESENCE OF CARDIAC PACEMAKER: Chronic | ICD-10-CM

## 2024-10-22 DIAGNOSIS — Z95.1 PRESENCE OF AORTOCORONARY BYPASS GRAFT: Chronic | ICD-10-CM

## 2024-10-22 DIAGNOSIS — Z98.890 OTHER SPECIFIED POSTPROCEDURAL STATES: Chronic | ICD-10-CM

## 2024-10-22 DIAGNOSIS — J69.0 PNEUMONITIS DUE TO INHALATION OF FOOD AND VOMIT: ICD-10-CM

## 2024-10-22 DIAGNOSIS — Z98.1 ARTHRODESIS STATUS: Chronic | ICD-10-CM

## 2024-10-22 DIAGNOSIS — Z98.41 CATARACT EXTRACTION STATUS, RIGHT EYE: Chronic | ICD-10-CM

## 2024-10-22 LAB
ALBUMIN SERPL ELPH-MCNC: 3.9 G/DL — SIGNIFICANT CHANGE UP (ref 3.3–5)
ALP SERPL-CCNC: 87 U/L — SIGNIFICANT CHANGE UP (ref 30–120)
ALT FLD-CCNC: 24 U/L — SIGNIFICANT CHANGE UP (ref 10–60)
ANION GAP SERPL CALC-SCNC: 10 MMOL/L — SIGNIFICANT CHANGE UP (ref 5–17)
APTT BLD: 29.3 SEC — SIGNIFICANT CHANGE UP (ref 24.5–35.6)
AST SERPL-CCNC: 21 U/L — SIGNIFICANT CHANGE UP (ref 10–40)
BASOPHILS # BLD AUTO: 0.02 K/UL — SIGNIFICANT CHANGE UP (ref 0–0.2)
BASOPHILS NFR BLD AUTO: 0.2 % — SIGNIFICANT CHANGE UP (ref 0–2)
BILIRUB SERPL-MCNC: 2.7 MG/DL — HIGH (ref 0.2–1.2)
BUN SERPL-MCNC: 38 MG/DL — HIGH (ref 7–23)
CALCIUM SERPL-MCNC: 9.9 MG/DL — SIGNIFICANT CHANGE UP (ref 8.4–10.5)
CHLORIDE SERPL-SCNC: 103 MMOL/L — SIGNIFICANT CHANGE UP (ref 96–108)
CO2 SERPL-SCNC: 29 MMOL/L — SIGNIFICANT CHANGE UP (ref 22–31)
CREAT SERPL-MCNC: 1.4 MG/DL — HIGH (ref 0.5–1.3)
EGFR: 51 ML/MIN/1.73M2 — LOW
EOSINOPHIL # BLD AUTO: 0 K/UL — SIGNIFICANT CHANGE UP (ref 0–0.5)
EOSINOPHIL NFR BLD AUTO: 0 % — SIGNIFICANT CHANGE UP (ref 0–6)
FLUAV AG NPH QL: SIGNIFICANT CHANGE UP
FLUBV AG NPH QL: SIGNIFICANT CHANGE UP
GLUCOSE SERPL-MCNC: 127 MG/DL — HIGH (ref 70–99)
HCT VFR BLD CALC: 42.2 % — SIGNIFICANT CHANGE UP (ref 39–50)
HGB BLD-MCNC: 13.6 G/DL — SIGNIFICANT CHANGE UP (ref 13–17)
IMM GRANULOCYTES NFR BLD AUTO: 0.2 % — SIGNIFICANT CHANGE UP (ref 0–0.9)
INR BLD: 1.3 RATIO — HIGH (ref 0.85–1.16)
LACTATE SERPL-SCNC: 3.4 MMOL/L — HIGH (ref 0.7–2)
LYMPHOCYTES # BLD AUTO: 0.34 K/UL — LOW (ref 1–3.3)
LYMPHOCYTES # BLD AUTO: 3.5 % — LOW (ref 13–44)
MCHC RBC-ENTMCNC: 32.2 G/DL — SIGNIFICANT CHANGE UP (ref 32–36)
MCHC RBC-ENTMCNC: 33.2 PG — SIGNIFICANT CHANGE UP (ref 27–34)
MCV RBC AUTO: 102.9 FL — HIGH (ref 80–100)
MONOCYTES # BLD AUTO: 0.37 K/UL — SIGNIFICANT CHANGE UP (ref 0–0.9)
MONOCYTES NFR BLD AUTO: 3.8 % — SIGNIFICANT CHANGE UP (ref 2–14)
NEUTROPHILS # BLD AUTO: 8.9 K/UL — HIGH (ref 1.8–7.4)
NEUTROPHILS NFR BLD AUTO: 92.3 % — HIGH (ref 43–77)
NRBC # BLD: 0 /100 WBCS — SIGNIFICANT CHANGE UP (ref 0–0)
PLATELET # BLD AUTO: 173 K/UL — SIGNIFICANT CHANGE UP (ref 150–400)
POTASSIUM SERPL-MCNC: 4.2 MMOL/L — SIGNIFICANT CHANGE UP (ref 3.5–5.3)
POTASSIUM SERPL-SCNC: 4.2 MMOL/L — SIGNIFICANT CHANGE UP (ref 3.5–5.3)
PROT SERPL-MCNC: 7.3 G/DL — SIGNIFICANT CHANGE UP (ref 6–8.3)
PROTHROM AB SERPL-ACNC: 15 SEC — HIGH (ref 9.9–13.4)
RBC # BLD: 4.1 M/UL — LOW (ref 4.2–5.8)
RBC # FLD: 14.8 % — HIGH (ref 10.3–14.5)
RSV RNA NPH QL NAA+NON-PROBE: SIGNIFICANT CHANGE UP
SARS-COV-2 RNA SPEC QL NAA+PROBE: SIGNIFICANT CHANGE UP
SODIUM SERPL-SCNC: 142 MMOL/L — SIGNIFICANT CHANGE UP (ref 135–145)
WBC # BLD: 9.65 K/UL — SIGNIFICANT CHANGE UP (ref 3.8–10.5)
WBC # FLD AUTO: 9.65 K/UL — SIGNIFICANT CHANGE UP (ref 3.8–10.5)

## 2024-10-22 PROCEDURE — 93010 ELECTROCARDIOGRAM REPORT: CPT

## 2024-10-22 PROCEDURE — 71045 X-RAY EXAM CHEST 1 VIEW: CPT | Mod: 26

## 2024-10-22 PROCEDURE — 99285 EMERGENCY DEPT VISIT HI MDM: CPT

## 2024-10-22 RX ORDER — MELATONIN 5 MG
3 TABLET ORAL AT BEDTIME
Refills: 0 | Status: DISCONTINUED | OUTPATIENT
Start: 2024-10-22 | End: 2024-10-23

## 2024-10-22 RX ORDER — VANCOMYCIN HYDROCHLORIDE 50 MG/ML
1000 KIT ORAL ONCE
Refills: 0 | Status: COMPLETED | OUTPATIENT
Start: 2024-10-22 | End: 2024-10-22

## 2024-10-22 RX ORDER — PIPERACILLIN AND TAZOBACTAM .5; 4 G/20ML; G/20ML
3.38 INJECTION, POWDER, LYOPHILIZED, FOR SOLUTION INTRAVENOUS EVERY 8 HOURS
Refills: 0 | Status: DISCONTINUED | OUTPATIENT
Start: 2024-10-23 | End: 2024-10-23

## 2024-10-22 RX ORDER — PIPERACILLIN AND TAZOBACTAM .5; 4 G/20ML; G/20ML
3.38 INJECTION, POWDER, LYOPHILIZED, FOR SOLUTION INTRAVENOUS ONCE
Refills: 0 | Status: COMPLETED | OUTPATIENT
Start: 2024-10-23 | End: 2024-10-23

## 2024-10-22 RX ORDER — ACETAMINOPHEN 500 MG
650 TABLET ORAL EVERY 6 HOURS
Refills: 0 | Status: DISCONTINUED | OUTPATIENT
Start: 2024-10-22 | End: 2024-10-23

## 2024-10-22 RX ORDER — ONDANSETRON HYDROCHLORIDE 2 MG/ML
4 INJECTION, SOLUTION INTRAMUSCULAR; INTRAVENOUS EVERY 6 HOURS
Refills: 0 | Status: DISCONTINUED | OUTPATIENT
Start: 2024-10-22 | End: 2024-10-29

## 2024-10-22 RX ORDER — CEFTRIAXONE SODIUM 10 G
1000 VIAL (EA) INJECTION ONCE
Refills: 0 | Status: COMPLETED | OUTPATIENT
Start: 2024-10-22 | End: 2024-10-22

## 2024-10-22 RX ORDER — SODIUM CHLORIDE 9 MG/ML
1000 INJECTION, SOLUTION INTRAMUSCULAR; INTRAVENOUS; SUBCUTANEOUS ONCE
Refills: 0 | Status: COMPLETED | OUTPATIENT
Start: 2024-10-22 | End: 2024-10-22

## 2024-10-22 RX ORDER — PIPERACILLIN AND TAZOBACTAM .5; 4 G/20ML; G/20ML
3.38 INJECTION, POWDER, LYOPHILIZED, FOR SOLUTION INTRAVENOUS ONCE
Refills: 0 | Status: COMPLETED | OUTPATIENT
Start: 2024-10-22 | End: 2024-10-22

## 2024-10-22 RX ORDER — PIPERACILLIN AND TAZOBACTAM .5; 4 G/20ML; G/20ML
3.38 INJECTION, POWDER, LYOPHILIZED, FOR SOLUTION INTRAVENOUS ONCE
Refills: 0 | Status: DISCONTINUED | OUTPATIENT
Start: 2024-10-23 | End: 2024-10-23

## 2024-10-22 RX ORDER — ACETAMINOPHEN 500 MG
1000 TABLET ORAL ONCE
Refills: 0 | Status: COMPLETED | OUTPATIENT
Start: 2024-10-22 | End: 2024-10-22

## 2024-10-22 RX ORDER — SODIUM CHLORIDE 9 MG/ML
1500 INJECTION, SOLUTION INTRAMUSCULAR; INTRAVENOUS; SUBCUTANEOUS ONCE
Refills: 0 | Status: COMPLETED | OUTPATIENT
Start: 2024-10-22 | End: 2024-10-22

## 2024-10-22 RX ORDER — ENOXAPARIN SODIUM 40MG/0.4ML
40 SYRINGE (ML) SUBCUTANEOUS EVERY 24 HOURS
Refills: 0 | Status: DISCONTINUED | OUTPATIENT
Start: 2024-10-22 | End: 2024-10-29

## 2024-10-22 RX ORDER — MAGNESIUM, ALUMINUM HYDROXIDE 200-200 MG
30 TABLET,CHEWABLE ORAL EVERY 4 HOURS
Refills: 0 | Status: DISCONTINUED | OUTPATIENT
Start: 2024-10-22 | End: 2024-10-23

## 2024-10-22 RX ADMIN — SODIUM CHLORIDE 1000 MILLILITER(S): 9 INJECTION, SOLUTION INTRAMUSCULAR; INTRAVENOUS; SUBCUTANEOUS at 23:11

## 2024-10-22 RX ADMIN — Medication 1000 MILLIGRAM(S): at 22:49

## 2024-10-22 RX ADMIN — SODIUM CHLORIDE 1000 MILLILITER(S): 9 INJECTION, SOLUTION INTRAMUSCULAR; INTRAVENOUS; SUBCUTANEOUS at 22:13

## 2024-10-22 RX ADMIN — Medication 400 MILLIGRAM(S): at 22:34

## 2024-10-22 RX ADMIN — VANCOMYCIN HYDROCHLORIDE 1000 MILLIGRAM(S): KIT at 23:59

## 2024-10-22 RX ADMIN — Medication 100 MILLIGRAM(S): at 22:12

## 2024-10-22 RX ADMIN — Medication 1000 MILLIGRAM(S): at 22:35

## 2024-10-22 RX ADMIN — VANCOMYCIN HYDROCHLORIDE 250 MILLIGRAM(S): KIT at 22:58

## 2024-10-22 RX ADMIN — SODIUM CHLORIDE 1500 MILLILITER(S): 9 INJECTION, SOLUTION INTRAMUSCULAR; INTRAVENOUS; SUBCUTANEOUS at 23:59

## 2024-10-22 RX ADMIN — Medication 1000 MILLIGRAM(S): at 22:59

## 2024-10-22 RX ADMIN — SODIUM CHLORIDE 1500 MILLILITER(S): 9 INJECTION, SOLUTION INTRAMUSCULAR; INTRAVENOUS; SUBCUTANEOUS at 22:54

## 2024-10-22 NOTE — ED PROVIDER NOTE - PHYSICAL EXAMINATION
Gen: Alert, NAD  Head/eyes: NC/AT, PERRL  ENT: airway patent  Neck: supple  Pulm/lung: Bilateral clear BS  CV/heart: coarse breath sounds b/l  GI/Abd: soft, NT/ND, +BS, no guarding/rebound tenderness  Musculoskeletal: no edema/erythema/cyanosis  Skin: no rash  Neuro: AAOx3, grossly intact

## 2024-10-22 NOTE — ED PROVIDER NOTE - OBJECTIVE STATEMENT
78-year-old male history of esophageal motility issues not on his medications prescribed by his GI complaining of spitting up possibly aspirating having a low-grade fever 100.4 here.  Noted to have hypoxia at 89% on room air.

## 2024-10-22 NOTE — ED PROVIDER NOTE - CARE PLAN
Principal Discharge DX:	Pneumonia, aspiration  Secondary Diagnosis:	Fever  Secondary Diagnosis:	Hypoxia   1

## 2024-10-22 NOTE — ED PROVIDER NOTE - CLINICAL SUMMARY MEDICAL DECISION MAKING FREE TEXT BOX
78-year-old male history of esophageal motility issues not on his medications prescribed by his GI complaining of spitting up possibly aspirating having a low-grade fever 100.4 here.  Noted to have hypoxia at 89% on room air.    r/o sepsis, pna, uti, labs, XR, IV abx, NC oxygen, antipyretics, IV fluids

## 2024-10-22 NOTE — ED ADULT NURSE NOTE - NSFALLHARMRISKINTERV_ED_ALL_ED

## 2024-10-22 NOTE — ED ADULT NURSE NOTE - OBJECTIVE STATEMENT
I keep regurgitating anything I eat or drink; pt following with GI; hx problems swallowing and was placed on a medication to relax his muscles to help with his dysphasia; pt developed a rash and was advised to d/c muscle relaxer while taking medications for rash which was prescribed by dermatology; GI unaware that pt stopped taking his "swallowing medication"; denies SOB  vomiting, regurgitation

## 2024-10-22 NOTE — ED ADULT NURSE NOTE - NS PRO PASSIVE SMOKE EXP
Unknown [phone] Smokers Quitline ([phone]) [facility] Smokers Quitline ([phone]-[phone])

## 2024-10-22 NOTE — ED ADULT TRIAGE NOTE - CHIEF COMPLAINT QUOTE
I keep regurgitating anything I eat or drink; pt following with GI; hx problems swallowing and was placed on a medication to relax his muscles to help with his dysphasia; pt developed a rash and was advised to d/c muscle relaxer while taking medications for rash which was prescribed by dermatology; GI unaware that pt stopped taking his "swallowing medication"; denies SOB

## 2024-10-23 DIAGNOSIS — J69.0 PNEUMONITIS DUE TO INHALATION OF FOOD AND VOMIT: ICD-10-CM

## 2024-10-23 DIAGNOSIS — J96.01 ACUTE RESPIRATORY FAILURE WITH HYPOXIA: ICD-10-CM

## 2024-10-23 DIAGNOSIS — R13.10 DYSPHAGIA, UNSPECIFIED: ICD-10-CM

## 2024-10-23 LAB
ALBUMIN SERPL ELPH-MCNC: 3.2 G/DL — LOW (ref 3.3–5)
ALP SERPL-CCNC: 71 U/L — SIGNIFICANT CHANGE UP (ref 30–120)
ALT FLD-CCNC: 17 U/L — SIGNIFICANT CHANGE UP (ref 10–60)
ANION GAP SERPL CALC-SCNC: 10 MMOL/L — SIGNIFICANT CHANGE UP (ref 5–17)
APPEARANCE UR: CLEAR — SIGNIFICANT CHANGE UP
AST SERPL-CCNC: 22 U/L — SIGNIFICANT CHANGE UP (ref 10–40)
BACTERIA # UR AUTO: ABNORMAL /HPF
BASOPHILS # BLD AUTO: 0.01 K/UL — SIGNIFICANT CHANGE UP (ref 0–0.2)
BASOPHILS NFR BLD AUTO: 0.1 % — SIGNIFICANT CHANGE UP (ref 0–2)
BILIRUB SERPL-MCNC: 2.6 MG/DL — HIGH (ref 0.2–1.2)
BILIRUB UR-MCNC: ABNORMAL
BUN SERPL-MCNC: 38 MG/DL — HIGH (ref 7–23)
CALCIUM SERPL-MCNC: 9.2 MG/DL — SIGNIFICANT CHANGE UP (ref 8.4–10.5)
CHLORIDE SERPL-SCNC: 104 MMOL/L — SIGNIFICANT CHANGE UP (ref 96–108)
CO2 SERPL-SCNC: 26 MMOL/L — SIGNIFICANT CHANGE UP (ref 22–31)
COLOR SPEC: SIGNIFICANT CHANGE UP
COMMENT - URINE: SIGNIFICANT CHANGE UP
CREAT SERPL-MCNC: 1.19 MG/DL — SIGNIFICANT CHANGE UP (ref 0.5–1.3)
CRP SERPL-MCNC: 144 MG/L — HIGH
DIFF PNL FLD: NEGATIVE — SIGNIFICANT CHANGE UP
EGFR: 62 ML/MIN/1.73M2 — SIGNIFICANT CHANGE UP
EOSINOPHIL # BLD AUTO: 0.02 K/UL — SIGNIFICANT CHANGE UP (ref 0–0.5)
EOSINOPHIL NFR BLD AUTO: 0.2 % — SIGNIFICANT CHANGE UP (ref 0–6)
EPI CELLS # UR: SIGNIFICANT CHANGE UP
ERYTHROCYTE [SEDIMENTATION RATE] IN BLOOD: 8 MM/HR — SIGNIFICANT CHANGE UP (ref 0–9)
GLUCOSE SERPL-MCNC: 127 MG/DL — HIGH (ref 70–99)
GLUCOSE UR QL: NEGATIVE MG/DL — SIGNIFICANT CHANGE UP
GRAN CASTS # UR COMP ASSIST: PRESENT
HCT VFR BLD CALC: 36.7 % — LOW (ref 39–50)
HGB BLD-MCNC: 12.3 G/DL — LOW (ref 13–17)
IMM GRANULOCYTES NFR BLD AUTO: 0.2 % — SIGNIFICANT CHANGE UP (ref 0–0.9)
KETONES UR-MCNC: 15 MG/DL
LACTATE SERPL-SCNC: 1.9 MMOL/L — SIGNIFICANT CHANGE UP (ref 0.7–2)
LACTATE SERPL-SCNC: 2.2 MMOL/L — HIGH (ref 0.7–2)
LACTATE SERPL-SCNC: 2.6 MMOL/L — HIGH (ref 0.7–2)
LEUKOCYTE ESTERASE UR-ACNC: NEGATIVE — SIGNIFICANT CHANGE UP
LYMPHOCYTES # BLD AUTO: 0.47 K/UL — LOW (ref 1–3.3)
LYMPHOCYTES # BLD AUTO: 5.6 % — LOW (ref 13–44)
MCHC RBC-ENTMCNC: 33.5 G/DL — SIGNIFICANT CHANGE UP (ref 32–36)
MCHC RBC-ENTMCNC: 34.3 PG — HIGH (ref 27–34)
MCV RBC AUTO: 102.2 FL — HIGH (ref 80–100)
MONOCYTES # BLD AUTO: 0.31 K/UL — SIGNIFICANT CHANGE UP (ref 0–0.9)
MONOCYTES NFR BLD AUTO: 3.7 % — SIGNIFICANT CHANGE UP (ref 2–14)
NEUTROPHILS # BLD AUTO: 7.61 K/UL — HIGH (ref 1.8–7.4)
NEUTROPHILS NFR BLD AUTO: 90.2 % — HIGH (ref 43–77)
NITRITE UR-MCNC: NEGATIVE — SIGNIFICANT CHANGE UP
NRBC # BLD: 0 /100 WBCS — SIGNIFICANT CHANGE UP (ref 0–0)
NT-PROBNP SERPL-SCNC: 3564 PG/ML — HIGH (ref 0–450)
PH UR: 5 — SIGNIFICANT CHANGE UP (ref 5–8)
PLATELET # BLD AUTO: 158 K/UL — SIGNIFICANT CHANGE UP (ref 150–400)
POTASSIUM SERPL-MCNC: 3.7 MMOL/L — SIGNIFICANT CHANGE UP (ref 3.5–5.3)
POTASSIUM SERPL-SCNC: 3.7 MMOL/L — SIGNIFICANT CHANGE UP (ref 3.5–5.3)
PROCALCITONIN SERPL-MCNC: 8.84 NG/ML — HIGH (ref 0.02–0.1)
PROT SERPL-MCNC: 6.3 G/DL — SIGNIFICANT CHANGE UP (ref 6–8.3)
PROT UR-MCNC: 30 MG/DL
RBC # BLD: 3.59 M/UL — LOW (ref 4.2–5.8)
RBC # FLD: 14.9 % — HIGH (ref 10.3–14.5)
RBC CASTS # UR COMP ASSIST: 1 /HPF — SIGNIFICANT CHANGE UP (ref 0–4)
SODIUM SERPL-SCNC: 140 MMOL/L — SIGNIFICANT CHANGE UP (ref 135–145)
SP GR SPEC: 1.03 — HIGH (ref 1–1.03)
UROBILINOGEN FLD QL: 1 MG/DL — SIGNIFICANT CHANGE UP (ref 0.2–1)
WBC # BLD: 8.44 K/UL — SIGNIFICANT CHANGE UP (ref 3.8–10.5)
WBC # FLD AUTO: 8.44 K/UL — SIGNIFICANT CHANGE UP (ref 3.8–10.5)
WBC UR QL: 2 /HPF — SIGNIFICANT CHANGE UP (ref 0–5)

## 2024-10-23 PROCEDURE — 74220 X-RAY XM ESOPHAGUS 1CNTRST: CPT | Mod: 26

## 2024-10-23 PROCEDURE — 71250 CT THORAX DX C-: CPT | Mod: 26

## 2024-10-23 RX ORDER — SODIUM CHLORIDE 9 MG/ML
1000 INJECTION, SOLUTION INTRAMUSCULAR; INTRAVENOUS; SUBCUTANEOUS
Refills: 0 | Status: DISCONTINUED | OUTPATIENT
Start: 2024-10-23 | End: 2024-10-25

## 2024-10-23 RX ORDER — PIPERACILLIN AND TAZOBACTAM .5; 4 G/20ML; G/20ML
3.38 INJECTION, POWDER, LYOPHILIZED, FOR SOLUTION INTRAVENOUS EVERY 8 HOURS
Refills: 0 | Status: DISCONTINUED | OUTPATIENT
Start: 2024-10-23 | End: 2024-10-29

## 2024-10-23 RX ORDER — PANTOPRAZOLE SODIUM 40 MG/1
40 TABLET, DELAYED RELEASE ORAL
Refills: 0 | Status: DISCONTINUED | OUTPATIENT
Start: 2024-10-23 | End: 2024-10-25

## 2024-10-23 RX ORDER — SUCRALFATE 1 G/10ML
1 SUSPENSION ORAL EVERY 6 HOURS
Refills: 0 | Status: DISCONTINUED | OUTPATIENT
Start: 2024-10-23 | End: 2024-10-23

## 2024-10-23 RX ORDER — PANTOPRAZOLE SODIUM 40 MG/1
40 TABLET, DELAYED RELEASE ORAL DAILY
Refills: 0 | Status: DISCONTINUED | OUTPATIENT
Start: 2024-10-23 | End: 2024-10-23

## 2024-10-23 RX ADMIN — PIPERACILLIN AND TAZOBACTAM 25 GRAM(S): .5; 4 INJECTION, POWDER, LYOPHILIZED, FOR SOLUTION INTRAVENOUS at 20:14

## 2024-10-23 RX ADMIN — PANTOPRAZOLE SODIUM 40 MILLIGRAM(S): 40 TABLET, DELAYED RELEASE ORAL at 12:43

## 2024-10-23 RX ADMIN — PANTOPRAZOLE SODIUM 40 MILLIGRAM(S): 40 TABLET, DELAYED RELEASE ORAL at 18:07

## 2024-10-23 RX ADMIN — PIPERACILLIN AND TAZOBACTAM 25 GRAM(S): .5; 4 INJECTION, POWDER, LYOPHILIZED, FOR SOLUTION INTRAVENOUS at 04:27

## 2024-10-23 RX ADMIN — PIPERACILLIN AND TAZOBACTAM 200 GRAM(S): .5; 4 INJECTION, POWDER, LYOPHILIZED, FOR SOLUTION INTRAVENOUS at 00:39

## 2024-10-23 RX ADMIN — Medication 40 MILLIGRAM(S): at 04:27

## 2024-10-23 RX ADMIN — PIPERACILLIN AND TAZOBACTAM 25 GRAM(S): .5; 4 INJECTION, POWDER, LYOPHILIZED, FOR SOLUTION INTRAVENOUS at 12:44

## 2024-10-23 RX ADMIN — SODIUM CHLORIDE 100 MILLILITER(S): 9 INJECTION, SOLUTION INTRAMUSCULAR; INTRAVENOUS; SUBCUTANEOUS at 20:14

## 2024-10-23 NOTE — H&P ADULT - PROBLEM SELECTOR PLAN 2
Puree diet for now pending SLP eval  Start iv PPI  Unclear what medication patient was on for dysphagia or what medication he had a reaction to  GI consult  Further work-up/management pending clinical course.

## 2024-10-23 NOTE — SWALLOW BEDSIDE ASSESSMENT ADULT - COMMENTS
Chart reviewed order received for swallow eval.  Pt received in bed with HOB elevated A&A Ox4, +O2NC SpO2 92%, HOB elevated upright for eval, pain scale 0/10 pre & post eval.  Swallow eval completed see below for details.  Pt educated on rx's, verbalized understanding.  Pt left as received NAD RN covering for BRITT Gale, SANDRINE Bello & Dr. Mahmood notified.  Will follow.     Per charting, pt is a "This is a 78 M with PMH of HLD GI motility issues who came in c/o that he keeps regurgitating everything he eats or drinks. Patient is follows with a GI doctor. He has a h/o problems swallowing and was placed on a medication to 'relax his muscles' to help with his dysphagia. Patient says he developed a rash. Patient saw a dermatologist and was prescribed a medication. The dermatologist told him to stop the 'muscle relaxer'. He says his GI unaware that pt stopped taking his "swallowing medication". He denies SOB. In the ER he was found to have T 100.4 and SaO2 89% on RA."    Chest xray and CT Chest results pending.

## 2024-10-23 NOTE — CARE COORDINATION ASSESSMENT. - NSCAREPROVIDERS_GEN_ALL_CORE_FT
CARE PROVIDERS:  Accepting Physician: Hu Mahmood  Administration: Beverly Allen  Administration: Britni Johnston  Administration: Andrew Packer  Admitting: Hu Mahmood  Attending: Hu Mahmood  Case Management: Laura Solis  Consultant: Neto Pro  Consultant: Brynn Wells  Consultant: Willie Elder  Covering Team: Hu Mahmood  ED Attending: Aj Conner  ED Nurse: Caio Hinojosa  Infection Control: Bhavna Dhillon  Nurse: Shahla Del Rosario  Nurse: Leeanne Aguilera  Nurse: Liliana Benavides  Nurse: Kim Machado  Nurse: Hayley Liu  Nurse: Sumaya Wei  Ordered: ServiceAccount, [facility]M  Override: Sofie Devries  PCA/Nursing Assistant: Ion Crowder  PCA/Nursing Assistant: Saundra Ferguson  PCA/Nursing Assistant: Cristal Samayoa  Physical Therapy: Skyler Keating  Primary Team: Param Saini  Quality Review: Daksha East  Registered Dietitian: Beverly Huston  : Cecil Shaver  : Christianne Murphy  Speech Pathology: Maritza Perez

## 2024-10-23 NOTE — SWALLOW BEDSIDE ASSESSMENT ADULT - PHARYNGEAL PHASE
pt report of SOB SpO2 91-92%, increased WOB noted.  No further trials administered, pt in agreement to defer further trials./Wet vocal quality post oral intake pt report of SOB SpO2 91-92%, increased WOB noted.  No further trials administered, pt in agreement to defer further trials.  Pt left NAD, WOB improved after rest break SpO2 92% PA notified./Wet vocal quality post oral intake

## 2024-10-23 NOTE — CARE COORDINATION ASSESSMENT. - QUALITY OF FAMILY RELATIONSHIPS
Pt. here visiting2 sons who are HCP Cristobal Mathias declined offer to call family states I spoke to my son already today./supportive

## 2024-10-23 NOTE — DIETITIAN INITIAL EVALUATION ADULT - REASON FOR ADMISSION
Pneumonitis due to inhalation of food or vomitus    Per HPI:  This is a 78 M with PMH of HLD GI motility issues who came in c/o that he keeps regurgitating everything he eats or drinks. Patient is follows with a GI doctor. He has a h/o problems swallowing and was placed on a  dicyclomine to help with his dysphagia. Patient says he developed a rash. Patient saw a dermatologist and was prescribed prednisone. The dermatologist told him to stop the dicyclomine. He says his GI unaware that pt stopped taking his dicyclomine. He denies SOB. In the ER he was found to have T 100.4 and SaO2 89% on RA. (23 Oct 2024 05:30)

## 2024-10-23 NOTE — SWALLOW BEDSIDE ASSESSMENT ADULT - SLP GENERAL OBSERVATIONS
Pt received in bed with HOB elevated A&A Ox4, +O2NC SpO2 92%, HOB elevated upright for eval, pain scale 0/10 pre & post eval.

## 2024-10-23 NOTE — PATIENT CHOICE NOTE. - NSPTCHOICESTATE_GEN_ALL_CORE

## 2024-10-23 NOTE — SWALLOW BEDSIDE ASSESSMENT ADULT - SWALLOW EVAL: DIAGNOSIS
Limited assessment, as pt hesitant to accept PO trials due to report of regurgitation with breakfast this morning (food and liquids per pt).  Patient agreeable to accept thin liquids, pt self fed 1 trial of water (1 small sip) oral stage overall functional, however, suspect pharyngeal dysphagia due to +wet vocal quality post swallow with pt report of SOB SpO2 91-92%, increased WOB noted.  No further trials administered, pt in agreement to defer further trials.  Rx NPO with short-term non oral means of nutrition/hydration as per pt/family wishes and GI consult for further assessment due to history.  Will follow to reassess swallow at bedside and monitor candidacy for MBS when appropriate. Limited assessment, as pt hesitant to accept PO trials due to report of regurgitation with breakfast this morning (food and liquids per pt).  Patient agreeable to accept thin liquids, pt self fed 1 trial of water (1 small sip) oral stage overall functional, however, suspect pharyngeal dysphagia due to +wet vocal quality post swallow with pt report of SOB SpO2 91-92%, increased WOB noted.  No further trials administered, pt in agreement to defer further trials.  Pt left NAD, WOB improved after rest break SpO2 92%, PA notified.  Rx NPO with short-term non oral means of nutrition/hydration as per pt/family wishes and GI consult for further assessment due to history.  Will follow to reassess swallow at bedside and monitor candidacy for MBS when appropriate.

## 2024-10-23 NOTE — PHYSICAL THERAPY INITIAL EVALUATION ADULT - PERTINENT HX OF CURRENT PROBLEM, REHAB EVAL
This is a 78 M with PMH of HLD GI motility issues who came in c/o that he keeps regurgitating everything he eats or drinks. Patient is follows with a GI doctor. He has a h/o problems swallowing and was placed on a  dicyclomine to help with his dysphagia. Patient says he developed a rash. Patient saw a dermatologist and was prescribed prednisone. The dermatologist told him to stop the dicyclomine. He says his GI unaware that pt stopped taking his dicyclomine. He denies SOB. In the ER he was found to have T 100.4 and SaO2 89% on RA.

## 2024-10-23 NOTE — PATIENT CHOICE NOTE. - NSPTCHOICENOTES_GEN_ALL_CORE
Pt. states he has home care currently and wants to keep same agency he thinks it is [facility] and he was given a list [facility] to review .

## 2024-10-23 NOTE — CONSULT NOTE ADULT - SUBJECTIVE AND OBJECTIVE BOX
HPI:  79YO M with PMH of HLD GI motility issues dysphagia who presented to the hospital with c/o regurgitating everything he eats or drinks. In ER he was found to have T 100.4 WBC wnl and SaO2 89% on RA. CXR with Large patchy airspace opacity in the medial aspect of the right lung, likely pneumonia.     Infectious Disease consult was called to evaluate pt and for antibiotic management.      Past Medical & Surgical Hx:  PAST MEDICAL & SURGICAL HISTORY:  Dysphagia    Social History--  EtOH: denies   Tobacco: denies   Drug Use: denies     FAMILY HISTORY:  Noncontributory    Allergies  No Known Allergies    Intolerances  NONE    Home Medications:  dicyclomine 10 mg oral capsule: 2 cap(s) orally once a day (23 Oct 2024 12:12)  Pepcid 20 mg oral tablet: 1 tab(s) orally once a day (23 Oct 2024 12:12)  predniSONE 10 mg oral tablet: 4 tab(s) orally once a day taper by 10mg every 3 days, currently taking 30mg qd (23 Oct 2024 12:12)    Current Inpatient Medications :    ANTIBIOTICS:   piperacillin/tazobactam IVPB.. 3.375 Gram(s) IV Intermittent every 8 hours      OTHER RELEVANT MEDICATIONS :  enoxaparin Injectable 40 milliGRAM(s) SubCutaneous every 24 hours  ondansetron Injectable 4 milliGRAM(s) IV Push every 6 hours PRN  pantoprazole  Injectable 40 milliGRAM(s) IV Push two times a day  sodium chloride 0.9%. 1000 milliLiter(s) IV Continuous <Continuous>    ROS:  CONSTITUTIONAL:  + fever   EYES:  Negative  blurry vision or double vision  CARDIOVASCULAR:  Negative for chest pain or palpitations  RESPIRATORY: + for cough, SOB   GASTROINTESTINAL:  Negative for nausea, vomiting, diarrhea, constipation, or abdominal pain  GENITOURINARY:  Negative frequency, urgency , dysuria or hematuria   NEUROLOGIC:  No headache, confusion, dizziness, lightheadedness  All other systems were reviewed and are negative      Physical Exam:  Vital Signs Last 24 Hrs  T(C): 36.7 (23 Oct 2024 08:24), Max: 38 (22 Oct 2024 21:45)  T(F): 98 (23 Oct 2024 08:24), Max: 100.4 (22 Oct 2024 21:45)  HR: 81 (23 Oct 2024 07:15) (73 - 99)  BP: 140/63 (23 Oct 2024 07:15) (127/61 - 150/60)  BP(mean): 85 (23 Oct 2024 07:15) (85 - 85)  RR: 14 (23 Oct 2024 07:15) (14 - 20)  SpO2: 84% (23 Oct 2024 14:39) (84% - 96%)    Parameters below as of 22 Oct 2024 22:00    O2 Flow (L/min): 3    Height (cm): 182.9 (10-22 @ 21:35)  Weight (kg): 77.1 (10-22 @ 21:35)  BMI (kg/m2): 23 (10-22 @ 21:35)  BSA (m2): 1.99 (10-22 @ 21:35)    General: no acute distress  Neck: supple, trachea midline  Lungs: Decreased no wheeze/rhonchi  Cardiovascular: regular rate and rhythm, S1 S2  Abdomen: soft, nontender, ND, bowel sounds normal  Neurological:  alert and oriented x3  Skin: no rash  Extremities: + edema    Labs:                        12.3   8.44  )-----------( 158      ( 23 Oct 2024 05:45 )             36.7   10-23    140  |  104  |  38[H]  ----------------------------<  127[H]  3.7   |  26  |  1.19    Ca    9.2      23 Oct 2024 05:45    TPro  6.3  /  Alb  3.2[L]  /  TBili  2.6[H]  /  DBili  x   /  AST  22  /  ALT  17  /  AlkPhos  71  10-23     RECENT CULTURES:          RADIOLOGY & ADDITIONAL STUDIES:    ACC: [id number] EXAM:  XR CHEST AP OR PA 1V   ORDERED BY: WALESKA MCMULLEN     PROCEDURE DATE:  10/22/2024          INTERPRETATION:  HISTORY: Sepsis    TECHNIQUE: A single AP view of the chest was obtained.    COMPARISON: None.    FINDINGS: The heart size cannot be adequately assessed on this single   view. Patient status post median sternotomy and CABG. There is a   left-sided dual-lead pacemaker with leads in good position. There is a   central large patchy airspace opacity in the right lung. There are no   pleural effusions. The left lung is grossly clear.    IMPRESSION: Large patchy airspace opacity in the medial aspect of the   right lung, likely pneumonia. Please correlate with the pending CT scan   of the chest.  < from: Xray Chest 1 View AP/PA (10.22.24 @ 22:29) >    ACC: [id number] EXAM:  XR CHEST AP OR PA 1V   ORDERED BY: WALESKA MCMULLEN     PROCEDURE DATE:  10/22/2024          INTERPRETATION:  HISTORY: Sepsis    TECHNIQUE: A single AP view of the chest was obtained.    COMPARISON: None.    FINDINGS: The heart size cannot be adequately assessed on this single   view. Patient status post median sternotomy and CABG. There is a   left-sided dual-lead pacemaker with leads in good position. There is a   central large patchy airspace opacity in the right lung. There are no   pleural effusions. The left lung is grossly clear.    IMPRESSION:lease correlate with the pending CT scan   of the chest.    Assessment :   79YO M with PMH of HLD GI motility issues dysphagia who presented to the hospital with c/o regurgitating everything he eats or drinks. In ER he was found to have T 100.4 WBC wnl and SaO2 89% on RA. CXR with Large patchy airspace opacity in the medial aspect of the right lung, likely pneumonia. Admitted with Asp pna    Plan :   Cont Zosyn  Fu cultures  Trend temps and cbc  MBS  Asp precautions    Continue with present regiment .  Approptiate use of antibiotics and adverse effects reviewed.      I have discussed the above plan of care with patient in detail. He expressed understanding of the treatment plan . Risks, benefits and alternatives discussed in detail. I have asked if he has any questions or concerns and appropriately addressed them to the best of my ability .      > 45 minutes spent in direct patient care reviewing  the notes, lab data/ imaging , discussion with multidisciplinary team. All questions were addressed and answered to the best of my capacity .    Thank you for allowing me to participate in the care of your patient .      Brynn Wells MD  Infectious Disease  [phone]

## 2024-10-23 NOTE — CONSULT NOTE ADULT - ASSESSMENT
78-year-old male history of esophageal motility issues not on his medications prescribed by his GI complaining of spitting up possibly aspirating having a low-grade fever 100.4 here.  Noted to have hypoxia at 89% on room air. 78-year-old male history of esophageal motility issues not on his medications prescribed by his GI complaining of spitting up possibly aspirating having a low-grade fever 100.4 here.  Noted to have hypoxia at 89% on room air.    sepsis  ely  aspiration  asp pna  frail - weak - elderly  esophageal motility issues   ataxic gait    trend renal indices  I and O  replete lytes  trend Lactic Acid  emp ABX  asp prec  HOB elev  oral hygiene  skin care  monitor VS and HD and Sat  goal sat > 88 pct  Incentive Antonio if able to cooperate  CT chest -   GOC discussion  assist with needs

## 2024-10-23 NOTE — DIETITIAN INITIAL EVALUATION ADULT - OTHER INFO
Visited patient in room, pt able to answer basic questions. Discussed pt at team rounds and performed extensive chart review Presents NPO s/p SLP eval 10/23 recommending "NPO with short-term non oral means of nutrition/hydration as per pt/family wishes."  Denies d/c, endorsing regurgutation and difficulty swallowing. Pt on abx and fever has resolved. NKFA. Current adm weight 77.1kg, weight appears to be stable, will continue to monitor weight trends as able.    Pertinent labs/meds reviewed: receiving IVF, zofran    Education not appropriate at this time given current acute state. Recommend continue NPO with repeat evaluation from SLP as able. Consider alternate means of nutrition via post-pyloric feeds as able if pt continues NPO status. Nutrition plan to remain consistent w GOC. RD to remain available per protocol.

## 2024-10-23 NOTE — DIETITIAN INITIAL EVALUATION ADULT - PERTINENT LABORATORY DATA
10-23    140  |  104  |  38[H]  ----------------------------<  127[H]  3.7   |  26  |  1.19    Ca    9.2      23 Oct 2024 05:45    TPro  6.3  /  Alb  3.2[L]  /  TBili  2.6[H]  /  DBili  x   /  AST  22  /  ALT  17  /  AlkPhos  71  10-23

## 2024-10-23 NOTE — DIETITIAN INITIAL EVALUATION ADULT - PERTINENT MEDS FT
MEDICATIONS  (STANDING):  enoxaparin Injectable 40 milliGRAM(s) SubCutaneous every 24 hours  pantoprazole  Injectable 40 milliGRAM(s) IV Push two times a day  piperacillin/tazobactam IVPB.. 3.375 Gram(s) IV Intermittent every 8 hours  sodium chloride 0.9%. 1000 milliLiter(s) (100 mL/Hr) IV Continuous <Continuous>    MEDICATIONS  (PRN):  ondansetron Injectable 4 milliGRAM(s) IV Push every 6 hours PRN Nausea and/or Vomiting

## 2024-10-23 NOTE — H&P ADULT - PROBLEM SELECTOR PLAN 3
2/2 to aspiration PNA  Supplemental oxygen prn  Pulmonary consult  Further work-up/management pending clinical course.

## 2024-10-23 NOTE — H&P ADULT - HISTORY OF PRESENT ILLNESS
This is a 78 M with PMH of HLD GI motility issues who came in c/o that he keeps regurgitating everything he eats or drinks. Patient is follows with a GI doctor. He has a h/o problems swallowing and was placed on a medication to 'relax his muscles' to help with his dysphagia. Patient says he developed a rash. Patient saw a dermatologist and was prescribed a medication. The dermatologist told him to stop the 'muscle relaxer'. He says his GI unaware that pt stopped taking his "swallowing medication". He denies SOB. In the ER he was found to have T 100.4 and SaO2 89% on RA.    ***INCOMPLETE**** This is a 78 M with PMH of HLD GI motility issues who came in c/o that he keeps regurgitating everything he eats or drinks. Patient is follows with a GI doctor. He has a h/o problems swallowing and was placed on a  dicyclomine to help with his dysphagia. Patient says he developed a rash. Patient saw a dermatologist and was prescribed prednisone. The dermatologist told him to stop the dicyclomine. He says his GI unaware that pt stopped taking his dicyclomine. He denies SOB. In the ER he was found to have T 100.4 and SaO2 89% on RA.    ***INCOMPLETE**** This is a 78 M with PMH of HLD GI motility issues who came in c/o that he keeps regurgitating everything he eats or drinks. Patient is follows with a GI doctor. He has a h/o problems swallowing and was placed on a  dicyclomine to help with his dysphagia. Patient says he developed a rash. Patient saw a dermatologist and was prescribed prednisone. The dermatologist told him to stop the dicyclomine. He says his GI unaware that pt stopped taking his dicyclomine. He denies SOB. In the ER he was found to have T 100.4 and SaO2 89% on RA.

## 2024-10-23 NOTE — CONSULT NOTE ADULT - SUBJECTIVE AND OBJECTIVE BOX
[facility]    Jean Carlos Katerin Castelan NP    [address] [place] [address] [phone]      Chief Complaint:  Patient is a 78y old  Male who presents with a chief complaint of Asp PNA (23 Oct 2024 05:30)      HPI:   78 M with PMH of HLD GI motility issues who came in c/o that he keeps regurgitating everything he eats or drinks. Patient is follows with a GI doctor. He has a h/o problems swallowing and was placed on a  dicyclomine to help with his dysphagia. Patient says he developed a rash. Patient saw a dermatologist and was prescribed prednisone. The dermatologist told him to stop the dicyclomine. He says his GI unaware that pt stopped taking his dicyclomine. He denies SOB. In the ER he was found to have T 100.4 and SaO2 89% on RA.    “EGD 2024- esophageal spasms seen otherwise 2 cm hiatal hernia 40 to 42cm.Ulceration in the pylorus.No    Allergies:  No Known Allergies      Medications:  acetaminophen     Tablet .. 650 milliGRAM(s) Oral every 6 hours PRN  aluminum hydroxide/magnesium hydroxide/simethicone Suspension 30 milliLiter(s) Oral every 4 hours PRN  enoxaparin Injectable 40 milliGRAM(s) SubCutaneous every 24 hours  melatonin 3 milliGRAM(s) Oral at bedtime PRN  ondansetron Injectable 4 milliGRAM(s) IV Push every 6 hours PRN  pantoprazole  Injectable 40 milliGRAM(s) IV Push daily  piperacillin/tazobactam IVPB.. 3.375 Gram(s) IV Intermittent every 8 hours  sodium chloride 0.9%. 1000 milliLiter(s) IV Continuous <Continuous>      PMHX/PSHX:  No pertinent past medical history    Dysphagia        Family history:      Social History:     ROS:     General:  No wt loss, fevers, chills, night sweats, fatigue,   Eyes:  Good vision, no reported pain  ENT:  No sore throat, pain, runny nose, dysphagia  CV:  No pain, palpitations, hypo/hypertension  Resp:  No dyspnea, cough, tachypnea, wheezing  GI:  No pain, No nausea, No vomiting, No diarrhea, No constipation, No weight loss, No fever, No pruritis, No rectal bleeding, No tarry stools, No dysphagia,  :  No pain, bleeding, incontinence, nocturia  Muscle:  No pain, weakness  Neuro:  No weakness, tingling, memory problems  Psych:  No fatigue, insomnia, mood problems, depression  Endocrine:  No polyuria, polydipsia, cold/heat intolerance  Heme:  No petechiae, ecchymosis, easy bruisability  Skin:  No rash, tattoos, scars, edema      PHYSICAL EXAM:   Vital Signs:  Vital Signs Last 24 Hrs  T(C): 36.7 (23 Oct 2024 08:24), Max: 38 (22 Oct 2024 21:45)  T(F): 98 (23 Oct 2024 08:24), Max: 100.4 (22 Oct 2024 21:45)  HR: 81 (23 Oct 2024 07:15) (73 - 99)  BP: 140/63 (23 Oct 2024 07:15) (127/61 - 150/60)  BP(mean): 85 (23 Oct 2024 07:15) (85 - 85)  RR: 14 (23 Oct 2024 07:15) (14 - 20)  SpO2: 96% (23 Oct 2024 07:15) (89% - 96%)    Parameters below as of 22 Oct 2024 22:00    O2 Flow (L/min): 3    Daily Height in cm: 182.88 (22 Oct 2024 21:35)    Daily Weight in k.2 (23 Oct 2024 07:15)    GENERAL:  Appears stated age, well-groomed, well-nourished, no distress  HEENT:  NC/AT,  conjunctivae clear and pink, no thyromegaly, nodules, adenopathy, no JVD, sclera -anicteric  CHEST:  Full & symmetric excursion, no increased effort, breath sounds clear  HEART:  Regular rhythm, S1, S2, no murmur/rub/S3/S4, no abdominal bruit, no edema  ABDOMEN:  Soft, non-tender, non-distended, normoactive bowel sounds,  no masses ,no hepato-splenomegaly, no signs of chronic liver disease  EXTEREMITIES:  no cyanosis,clubbing or edema  SKIN:  No rash/erythema/ecchymoses/petechiae/wounds/abscess/warm/dry  NEURO:  Alert, oriented, no asterixis, no tremor, no encephalopathy    LABS:                        12.3   8.44  )-----------( 158      ( 23 Oct 2024 05:45 )             36.7     10    140  |  104  |  38[H]  ----------------------------<  127[H]  3.7   |  26  |  1.19    Ca    9.2      23 Oct 2024 05:45    TPro  6.3  /  Alb  3.2[L]  /  TBili  2.6[H]  /  DBili  x   /  AST  22  /  ALT  17  /  AlkPhos  71  10-23    LIVER FUNCTIONS - ( 23 Oct 2024 05:45 )  Alb: 3.2 g/dL / Pro: 6.3 g/dL / ALK PHOS: 71 U/L / ALT: 17 U/L / AST: 22 U/L / GGT: x           PT/INR - ( 22 Oct 2024 22:09 )   PT: 15.0 sec;   INR: 1.30 ratio         PTT - ( 22 Oct 2024 22:09 )  PTT:29.3 sec  Urinalysis Basic - ( 23 Oct 2024 05:45 )    Color: x / Appearance: x / SG: x / pH: x  Gluc: 127 mg/dL / Ketone: x  / Bili: x / Urobili: x   Blood: x / Protein: x / Nitrite: x   Leuk Esterase: x / RBC: x / WBC x   Sq Epi: x / Non Sq Epi: x / Bacteria: x          Imaging:

## 2024-10-23 NOTE — PHYSICAL THERAPY INITIAL EVALUATION ADULT - RANGE OF MOTION EXAMINATION, REHAB EVAL
bilateral hip flex limited ~1/4 range; knee ext limited 1/4 range; right shld flex limited 1/2 range; left shld flex limited 1/4 range/deficits as listed below

## 2024-10-23 NOTE — CARE COORDINATION ASSESSMENT. - PRO ARRIVE FROM
[facility] extended stay in [place].    CM met with pt. at bedside introduced self and role of CM to assist with transition planning provided CM contact info and discharge planning resource folder with listof [facility] and pt. chose [facility] home care states he thinks he is currently on [facility] home care services and has Home visiting nurse and PT/OT services at the [facility].  States he also gets telehealth MD visit - he is not sure of the name of the doctor .  ( CM wants to verify with [facility] if pt. is on their service - will send a referral and ask for clarification/ confirmation.    Pt. states he privately hires a HHA from [facility] for 8 1/2 hrs M-F and 8 hr Sat/ Sun .  His HHA Remmie  visited in [place] today.      Pt. states he is visiting his 2 sons in [place] Cristobal [phone] and Stew for the past few months and staying at [facility].  States he went to [facility] for a while and they set him up with home care services after d/c.  States he has all his DME there Slideboard, Motorized scooter, RW, Cane Commode, Shower chair urinal for use at night. Pt. declined offer for CM to call sons - pt. states he manages his own HHA, meds, appointments himself.  Jay hidalgo in [facility], ground level.    PCP in [facility] Dr. Naeem Covington [phone]  Pharmacy [facility] [place].    HCP is 2 sons.    States he was in [facility] [place] in July.    Pt. is a CMS STAR for PNA and pt. given yellow card for possible [facility] health [facility] team to contact him after home care completed.      Pt. in agreement with referral to Home care - informed PT recommend 24 hour care and pt. states once he goes to bed he does not get up again and uses the urinal at night and does not feel he needs 24 hour care.  485 will be sent to [facility].  for safe transition planning.  d/c barbara TBD.  CM following/other (specify)

## 2024-10-23 NOTE — CONSULT NOTE ADULT - ASSESSMENT
Esophageal Dysphagia  Esophageal Dysmotility     Recommendations:  - Obtain further collateral from outpt GI in [place]. Suspect pt has underlying esophageal dysmotility possible diffuse esophageal spasm. He was reportedly placed on a medication to relax his esophagus ?CCB but caused a rash  - Esophagram if tolerates  - soft diet  - PPI 40mg IV BID  - Carafate QID  - Will follow with you    Neto Pro M.D.  [facility]  [phone]

## 2024-10-23 NOTE — GOALS OF CARE CONVERSATION - ADVANCED CARE PLANNING - CONVERSATION DETAILS
[facility]-Palliative care team met with patient at bedside in SPCU along with his caregiver. Reviewed patient's medical and social history as well as events leading to patient's hospitalization. Writer discussed patient's current diagnosis (Asp PNA, Dysphagia, Acute resp failure with hypoxia, HLD), medical condition and management. Patient reports he has a HCP with his sons Cristobal and Narendra as health care agents.  Inquired about patient's wishes regarding extent of medical care to be provided including thoughts regarding cardiopulmonary resuscitation and mechanical ventilation/intubation and NIV. Patient showed insight into medical condition. He states his wishes are for DNR/DNI/Trial NIV. Discussed MOLST form. MOLST completed and placed on chart. All questions answered. Psychosocial support provided.

## 2024-10-23 NOTE — DIETITIAN INITIAL EVALUATION ADULT - ORAL INTAKE PTA/DIET HISTORY
Pt lethargic and currently coughing/vomiting. Unable to obtain diet/weight hx at this time. Pt not previously seen by RD, no prior RD notes. Per rounds, pt had recent hospitalization at [facility], and SLP recommended soft and bite size diet with thin liquids and to confirm findings with MBS. MBS was never performed. Pt also with recent endoscopy showing espohageal spasms and hernia. Pt admitted for aspiration pneumonia.    Wts per HIE:  74.8kg x 1 year Sept 2023- Sept 2024

## 2024-10-23 NOTE — CARE COORDINATION ASSESSMENT. - NSDCPLANSERVICES_GEN_ALL_CORE
Pt. is On STAR report for diagnosis of Pneumonia. LACE = 4.  Per H&P:  To ED from home  .  78 M  Hx HLD GI motility issues who came in c/o that he keeps regurgitating everything he eats or drinks. Patient is follows with a GI doctor. He has a h/o problems swallowing and was placed on a medication to 'relax his muscles' to help with his dysphagia. Patient says he developed a rash. Patient saw a dermatologist and was prescribed a medication. The dermatologist told him to stop the 'muscle relaxer'. He says his GI unaware that pt stopped taking his "swallowing medication". He denies SOB. In the ER he was found to have T 100.4 and SaO2 89% on RA.    Per treatment team rounds pt. still acute: Asp PNA  B/L, Zosyn  o22lnc , NS 100c/h, Dysphagia   Sp/SW now NPO      Per old chart review:  sept 2024 ED visit and  6/ 23 /24 to July 6 admitted at [facility].  -------------------------    CM following for home care PT and possible transportation or home o2 needs.  Pt. states he manages his own HHA services.  7d x 81/2 M-F 8hr Sat/ sun With [facility]./Home Care/Same as Prior Admission/Transportation

## 2024-10-23 NOTE — CARE COORDINATION ASSESSMENT. - MET/SPOKE WITH
Private HHA came to visit and NIGEL spoke with her briefly she states pt. all  DME he needs.   Pt. states  she has a key to the Hotel room so he does not have to get OOB alone./patient/caregiver

## 2024-10-23 NOTE — CONSULT NOTE ADULT - SUBJECTIVE AND OBJECTIVE BOX
Date/Time Patient Seen:  		  Referring MD:   Data Reviewed	       Patient is a 78y old  Male who presents with a chief complaint of     Subjective/HPI     · Chief Complaint: The patient is a 78y Male complaining of vomiting.  · HPI Objective Statement: 78-year-old male history of esophageal motility issues not on his medications prescribed by his GI complaining of spitting up possibly aspirating having a low-grade fever 100.4 here.  Noted to have hypoxia at 89% on room air.    PAST MEDICAL & SURGICAL HISTORY:  No pertinent past medical history    HIV:    HIV Test Questions:  · In accordance with [place] State law, we offer every patient who comes to our ED an HIV test. Would you like to be tested today?	Opt out    HEPATITIS C TEST QUESTIONS:    Hepatitis C Test Questions:  · In accordance with [place] State Law, we offer every patient a Hepatitis C test. Would you like to be tested today?	Opt out    PAST MEDICAL/SURGICAL/FAMILY/SOCIAL HISTORY:    Past Medical, Past Surgical, and Family History:  PAST MEDICAL HISTORY:  No pertinent past medical history.     Tobacco Usage:  · Tobacco Usage	Unknown if ever smoked    ALLERGIES AND HOME MEDICATIONS:   Allergies:        Allergies:  	No Known Allergies:     Home Medications:   * Patient Currently Takes Medications as of 22-Oct-2024 22:24 documented in Structured Notes  · 	clonazePAM: Last Dose Taken:    · 	gabapentin 300 mg oral capsule: Last Dose Taken:  , 1 cap(s) orally 3 times a day  · 	melatonin: Last Dose Taken:    · 	Lipitor 10 mg oral tablet: Last Dose Taken:  , 1 tab(s) orally    VITAL SIGNS (Pullset):    ,,ED ADULT Flow Sheet:    22-Oct-2024 21:35  · Temp (F): 100.2  · Temp (C) Temp (C): 37.9  · Temp site Temp Site: oral  · Heart Rate Heart Rate (beats/min): 93  · BP Systolic Systolic: 139  · BP Diastolic Diastolic (mm Hg): 63  · Respiration Rate (breaths/min) Respiration Rate (breaths/min): 19  · SpO2 (%) SpO2 (%): 89  · O2 Delivery/Oxygen Delivery Method Patient On (Oxygen Delivery Method): room air  · How was the weight captured? Weight Type/Method: stated  · Dosing Weight (KILOGRAMS) Dosing Weight (KILOGRAMS): 77.1  · Dosing Weight  (POUNDS) Dosing Weight (POUNDS): 169.9  · Height type Height Type: stated  · Height (FEET) Height (FEET): 6  · Height (INCHES) Height (INCHES): 0  · Height (CENTIMETERS) Height (CENTIMETERS): 182.88  · BSA (m2): 1.99  · BMI (kG/m2) BMI (kG/m2): 23.1  · Ideal Body Weight(kg) Ideal Body Weight(kg): 78  · SpO2 (%) SpO2 (%): 89  · Preferred Language to Address Healthcare Preferred Language to Address Healthcare: English        Medication list         MEDICATIONS  (STANDING):  enoxaparin Injectable 40 milliGRAM(s) SubCutaneous every 24 hours  piperacillin/tazobactam IVPB.- 3.375 Gram(s) IV Intermittent once  piperacillin/tazobactam IVPB.- 3.375 Gram(s) IV Intermittent once  piperacillin/tazobactam IVPB.. 3.375 Gram(s) IV Intermittent every 8 hours    MEDICATIONS  (PRN):  acetaminophen     Tablet .. 650 milliGRAM(s) Oral every 6 hours PRN Temp greater or equal to 38C (100.4F), Mild Pain (1 - 3)  aluminum hydroxide/magnesium hydroxide/simethicone Suspension 30 milliLiter(s) Oral every 4 hours PRN Dyspepsia  melatonin 3 milliGRAM(s) Oral at bedtime PRN Insomnia  ondansetron Injectable 4 milliGRAM(s) IV Push every 6 hours PRN Nausea and/or Vomiting         Vitals log        ICU Vital Signs Last 24 Hrs  T(C): 37.7 (22 Oct 2024 23:32), Max: 38 (22 Oct 2024 21:45)  T(F): 99.9 (22 Oct 2024 23:32), Max: 100.4 (22 Oct 2024 21:45)  HR: 78 (22 Oct 2024 23:53) (77 - 99)  BP: 127/61 (22 Oct 2024 23:53) (127/61 - 150/60)  BP(mean): --  ABP: --  ABP(mean): --  RR: 18 (22 Oct 2024 23:53) (17 - 20)  SpO2: 96% (22 Oct 2024 23:53) (89% - 96%)    O2 Parameters below as of 22 Oct 2024 22:00    O2 Flow (L/min): 3               Input and Output:  I&O's Detail      Lab Data                        13.6   9.65  )-----------( 173      ( 22 Oct 2024 22:09 )             42.2     10-22    142  |  103  |  38[H]  ----------------------------<  127[H]  4.2   |  29  |  1.40[H]    Ca    9.9      22 Oct 2024 22:09    TPro  7.3  /  Alb  3.9  /  TBili  2.7[H]  /  DBili  x   /  AST  21  /  ALT  24  /  AlkPhos  87  10-22            Review of Systems	      Objective     Physical Examination        Pertinent Lab findings & Imaging      Howard:  NO   Adequate UO     I&O's Detail           Discussed with:     Cultures:	        Radiology                             Date/Time Patient Seen:  		  Referring MD:   Data Reviewed	       Patient is a 78y old  Male who presents with a chief complaint of     Subjective/HPI     · Chief Complaint: The patient is a 78y Male complaining of vomiting.  · HPI Objective Statement: 78-year-old male history of esophageal motility issues not on his medications prescribed by his GI complaining of spitting up possibly aspirating having a low-grade fever 100.4 here.  Noted to have hypoxia at 89% on room air.    PAST MEDICAL & SURGICAL HISTORY:  No pertinent past medical history    HIV:    HIV Test Questions:  · In accordance with [place] State law, we offer every patient who comes to our ED an HIV test. Would you like to be tested today?	Opt out    HEPATITIS C TEST QUESTIONS:    Hepatitis C Test Questions:  · In accordance with [place] Law, we offer every patient a Hepatitis C test. Would you like to be tested today?	Opt out    PAST MEDICAL/SURGICAL/FAMILY/SOCIAL HISTORY:    Past Medical, Past Surgical, and Family History:  PAST MEDICAL HISTORY:  No pertinent past medical history.     Tobacco Usage:  · Tobacco Usage	Unknown if ever smoked    ALLERGIES AND HOME MEDICATIONS:   Allergies:        Allergies:  	No Known Allergies:     Home Medications:   * Patient Currently Takes Medications as of 22-Oct-2024 22:24 documented in Structured Notes  · 	clonazePAM: Last Dose Taken:    · 	gabapentin 300 mg oral capsule: Last Dose Taken:  , 1 cap(s) orally 3 times a day  · 	melatonin: Last Dose Taken:    · 	Lipitor 10 mg oral tablet: Last Dose Taken:  , 1 tab(s) orally    VITAL SIGNS (Pullset):    ,,ED ADULT Flow Sheet:    22-Oct-2024 21:35  · Temp (F): 100.2  · Temp (C) Temp (C): 37.9  · Temp site Temp Site: oral  · Heart Rate Heart Rate (beats/min): 93  · BP Systolic Systolic: 139  · BP Diastolic Diastolic (mm Hg): 63  · Respiration Rate (breaths/min) Respiration Rate (breaths/min): 19  · SpO2 (%) SpO2 (%): 89  · O2 Delivery/Oxygen Delivery Method Patient On (Oxygen Delivery Method): room air  · How was the weight captured? Weight Type/Method: stated  · Dosing Weight (KILOGRAMS) Dosing Weight (KILOGRAMS): 77.1  · Dosing Weight  (POUNDS) Dosing Weight (POUNDS): 169.9  · Height type Height Type: stated  · Height (FEET) Height (FEET): 6  · Height (INCHES) Height (INCHES): 0  · Height (CENTIMETERS) Height (CENTIMETERS): 182.88  · BSA (m2): 1.99  · BMI (kG/m2) BMI (kG/m2): 23.1  · Ideal Body Weight(kg) Ideal Body Weight(kg): 78  · SpO2 (%) SpO2 (%): 89  · Preferred Language to Address Healthcare Preferred Language to Address Healthcare: English        Medication list         MEDICATIONS  (STANDING):  enoxaparin Injectable 40 milliGRAM(s) SubCutaneous every 24 hours  piperacillin/tazobactam IVPB.- 3.375 Gram(s) IV Intermittent once  piperacillin/tazobactam IVPB.- 3.375 Gram(s) IV Intermittent once  piperacillin/tazobactam IVPB.. 3.375 Gram(s) IV Intermittent every 8 hours    MEDICATIONS  (PRN):  acetaminophen     Tablet .. 650 milliGRAM(s) Oral every 6 hours PRN Temp greater or equal to 38C (100.4F), Mild Pain (1 - 3)  aluminum hydroxide/magnesium hydroxide/simethicone Suspension 30 milliLiter(s) Oral every 4 hours PRN Dyspepsia  melatonin 3 milliGRAM(s) Oral at bedtime PRN Insomnia  ondansetron Injectable 4 milliGRAM(s) IV Push every 6 hours PRN Nausea and/or Vomiting         Vitals log        ICU Vital Signs Last 24 Hrs  T(C): 37.7 (22 Oct 2024 23:32), Max: 38 (22 Oct 2024 21:45)  T(F): 99.9 (22 Oct 2024 23:32), Max: 100.4 (22 Oct 2024 21:45)  HR: 78 (22 Oct 2024 23:53) (77 - 99)  BP: 127/61 (22 Oct 2024 23:53) (127/61 - 150/60)  BP(mean): --  ABP: --  ABP(mean): --  RR: 18 (22 Oct 2024 23:53) (17 - 20)  SpO2: 96% (22 Oct 2024 23:53) (89% - 96%)    O2 Parameters below as of 22 Oct 2024 22:00    O2 Flow (L/min): 3               Input and Output:  I&O's Detail      Lab Data                        13.6   9.65  )-----------( 173      ( 22 Oct 2024 22:09 )             42.2     10-22    142  |  103  |  38[H]  ----------------------------<  127[H]  4.2   |  29  |  1.40[H]    Ca    9.9      22 Oct 2024 22:09    TPro  7.3  /  Alb  3.9  /  TBili  2.7[H]  /  DBili  x   /  AST  21  /  ALT  24  /  AlkPhos  87  10-22            Review of Systems	    weak  frail  dyspepsia  all systems reviewed    Objective     Physical Examination    heart s1s2  lung dc BS  head nc  head at  abd soft  alert      Pertinent Lab findings & Imaging      Howard:  NO   Adequate UO     I&O's Detail           Discussed with:     Cultures:	        Radiology    cxr with PNA  report pending

## 2024-10-23 NOTE — DIETITIAN INITIAL EVALUATION ADULT - ADD RECOMMEND
1) Diet progression pending GI/SLP   2) Consider alternate means of nutrition via post-pyloric if without PO for >2 days  3) Continue to monitor PO intake, tolerance to diet prescription, weights, labs, GI tolerance, and skin integrity. MD notified via teams regarding any change/changes to diet order.

## 2024-10-23 NOTE — PATIENT PROFILE ADULT - NSPROMEDSADMININFO_GEN_A_NUR
Medicare Wellness Visit patient outreach outcome:  Unable to make appointment: Unable to reach patient      3rd attempt    [facility] Asst  [phone]     no concerns

## 2024-10-23 NOTE — H&P ADULT - PROBLEM SELECTOR PLAN 1
Admit  Pan-culture  Start Zosyn  Trend WBC and lactate  Check procalcitonin level  Check CT chest  SLP eval  Monitor oxygen level and supplemental oxygen prn  Pulm/ID consults  Further work-up/management pending clinical course.

## 2024-10-23 NOTE — PHYSICAL THERAPY INITIAL EVALUATION ADULT - ADDITIONAL COMMENTS
Lives in house with 24/7 aide.  No stairs. Lives in house with 24/7 aide.  No stairs.  Has motorized scooter.

## 2024-10-23 NOTE — SWALLOW BEDSIDE ASSESSMENT ADULT - SLP PERTINENT HISTORY OF CURRENT PROBLEM
Pt known to speech tx from previous admission at [facility], last seen for bedside swallow eval 6/28/24 rx soft & bite sized with thin liquids and MBS, MBS not completed during admission.  Per charting from previous admission, pt had Endoscopy 7/5/24 “Impressions:-esophageal spasms seen otherwise 2 cm hiatal hernia 40 to 42cm.Ulceration in the pylorus.Normal mucosa in the whole examined duodenum. (Biopsy).”  Patient reported he’s been trying to maintain a soft & bite sized diet, has been regurgitating PO inclusive of breakfast this morning (food and liquids).  Denied recent swallow evaluations besides above stated bedside swallow eval.

## 2024-10-24 ENCOUNTER — RESULT REVIEW (OUTPATIENT)
Age: 79
End: 2024-10-24

## 2024-10-24 LAB
AMYLASE P1 CFR SERPL: 39 U/L — SIGNIFICANT CHANGE UP (ref 25–125)
ANION GAP SERPL CALC-SCNC: 9 MMOL/L — SIGNIFICANT CHANGE UP (ref 5–17)
BUN SERPL-MCNC: 39 MG/DL — HIGH (ref 7–23)
CALCIUM SERPL-MCNC: 9.4 MG/DL — SIGNIFICANT CHANGE UP (ref 8.4–10.5)
CHLORIDE SERPL-SCNC: 106 MMOL/L — SIGNIFICANT CHANGE UP (ref 96–108)
CO2 SERPL-SCNC: 28 MMOL/L — SIGNIFICANT CHANGE UP (ref 22–31)
CREAT SERPL-MCNC: 1.03 MG/DL — SIGNIFICANT CHANGE UP (ref 0.5–1.3)
EGFR: 73 ML/MIN/1.73M2 — SIGNIFICANT CHANGE UP
GLUCOSE SERPL-MCNC: 94 MG/DL — SIGNIFICANT CHANGE UP (ref 70–99)
HCT VFR BLD CALC: 36.3 % — LOW (ref 39–50)
HGB BLD-MCNC: 12 G/DL — LOW (ref 13–17)
LIDOCAIN IGE QN: 9 U/L — LOW (ref 16–77)
MAGNESIUM SERPL-MCNC: 2 MG/DL — SIGNIFICANT CHANGE UP (ref 1.6–2.6)
MCHC RBC-ENTMCNC: 33.1 G/DL — SIGNIFICANT CHANGE UP (ref 32–36)
MCHC RBC-ENTMCNC: 33.7 PG — SIGNIFICANT CHANGE UP (ref 27–34)
MCV RBC AUTO: 102 FL — HIGH (ref 80–100)
NRBC # BLD: 0 /100 WBCS — SIGNIFICANT CHANGE UP (ref 0–0)
PLATELET # BLD AUTO: 126 K/UL — LOW (ref 150–400)
POTASSIUM SERPL-MCNC: 3.4 MMOL/L — LOW (ref 3.5–5.3)
POTASSIUM SERPL-SCNC: 3.4 MMOL/L — LOW (ref 3.5–5.3)
RBC # BLD: 3.56 M/UL — LOW (ref 4.2–5.8)
RBC # FLD: 14.9 % — HIGH (ref 10.3–14.5)
SODIUM SERPL-SCNC: 143 MMOL/L — SIGNIFICANT CHANGE UP (ref 135–145)
WBC # BLD: 5.41 K/UL — SIGNIFICANT CHANGE UP (ref 3.8–10.5)
WBC # FLD AUTO: 5.41 K/UL — SIGNIFICANT CHANGE UP (ref 3.8–10.5)

## 2024-10-24 RX ORDER — POTASSIUM CHLORIDE 10 MEQ
10 TABLET, EXTENDED RELEASE ORAL
Refills: 0 | Status: COMPLETED | OUTPATIENT
Start: 2024-10-24 | End: 2024-10-24

## 2024-10-24 RX ORDER — HYOSCYAMINE SULFATE 0.125 MG
0.25 TABLET,DISINTEGRATING ORAL THREE TIMES A DAY
Refills: 0 | Status: DISCONTINUED | OUTPATIENT
Start: 2024-10-24 | End: 2024-10-29

## 2024-10-24 RX ORDER — POTASSIUM CHLORIDE 10 MEQ
10 TABLET, EXTENDED RELEASE ORAL
Refills: 0 | Status: DISCONTINUED | OUTPATIENT
Start: 2024-10-24 | End: 2024-10-24

## 2024-10-24 RX ADMIN — SODIUM CHLORIDE 100 MILLILITER(S): 9 INJECTION, SOLUTION INTRAMUSCULAR; INTRAVENOUS; SUBCUTANEOUS at 20:26

## 2024-10-24 RX ADMIN — PIPERACILLIN AND TAZOBACTAM 25 GRAM(S): .5; 4 INJECTION, POWDER, LYOPHILIZED, FOR SOLUTION INTRAVENOUS at 20:24

## 2024-10-24 RX ADMIN — PIPERACILLIN AND TAZOBACTAM 25 GRAM(S): .5; 4 INJECTION, POWDER, LYOPHILIZED, FOR SOLUTION INTRAVENOUS at 03:41

## 2024-10-24 RX ADMIN — PIPERACILLIN AND TAZOBACTAM 25 GRAM(S): .5; 4 INJECTION, POWDER, LYOPHILIZED, FOR SOLUTION INTRAVENOUS at 12:00

## 2024-10-24 RX ADMIN — Medication 100 MILLIEQUIVALENT(S): at 09:42

## 2024-10-24 RX ADMIN — Medication 0.25 MILLIGRAM(S): at 22:01

## 2024-10-24 RX ADMIN — Medication 40 MILLIGRAM(S): at 03:41

## 2024-10-24 RX ADMIN — SODIUM CHLORIDE 100 MILLILITER(S): 9 INJECTION, SOLUTION INTRAMUSCULAR; INTRAVENOUS; SUBCUTANEOUS at 12:01

## 2024-10-24 RX ADMIN — PANTOPRAZOLE SODIUM 40 MILLIGRAM(S): 40 TABLET, DELAYED RELEASE ORAL at 05:19

## 2024-10-24 RX ADMIN — Medication 100 MILLIEQUIVALENT(S): at 10:39

## 2024-10-24 RX ADMIN — PANTOPRAZOLE SODIUM 40 MILLIGRAM(S): 40 TABLET, DELAYED RELEASE ORAL at 17:34

## 2024-10-24 RX ADMIN — Medication 0.25 MILLIGRAM(S): at 14:35

## 2024-10-24 NOTE — CASE MANAGEMENT PROGRESS NOTE - NSCMPROGRESSNOTE_GEN_ALL_CORE
Per treatment team rounds : pt. acute:  STAR Esophogram dysmotility of Esophagus ,NPO and needs MBS, IVF 100cc/h, PNA b/l on Zosyn, o2 4lnc, LIVES IN [place] VISITING:   [facility]  FOR  extended stay in [place]. Found out that pt. had [facility] home care PT/OT/VN PTA and  telehealth with [facility] NP - sent referral to [facility] and spoke with his nurse Coby [phone] ( number provided by Pt. and his private hire PAULO Falcon at bedside today.  Informed [facility] care of above to cancel that referral.  CM team following .

## 2024-10-24 NOTE — CONSULT NOTE ADULT - ASSESSMENT
The patient is an 78 year old male with a history of HL, atrial fibrillation s/p Watchman, pacemaker, CAD s/ CABG, CVA, esophageal spasm who presents with difficulty swallowing.    Plan:  - ECG with atrial fibrillation and intermittent ventricular paced rhythm  - Pacemaker interrogated 6/24 with normal function and good battery life  - CT chest with multilobar PNA  - BNP 3564  - Check echo  - Not on anticoagulation for atrial fibrillation due to presence of left atrial appendage occlusion device  - Currently NPO  - For any tachycardia, can give metoprolol tartrate IV as needed  - GI follow-up  - If plan includes any GI intervention, may proceed from a cardiac standpoint

## 2024-10-24 NOTE — PROGRESS NOTE ADULT - ASSESSMENT
Esophageal Dysphagia  Esophageal Dysmotility     Recommendations:  - Patient will need esophageal manometry as outpt. Recent EGD 7/2024 reviewed with evidence of esophageal spasms   - soft diet  - PPI 40mg IV BID  - Carafate QID  - Added hyoscyamine .25 TID   - If ongoing symptoms will add CCB  - Will follow with you    Neto Pro M.D.  [facility]  [phone]

## 2024-10-24 NOTE — CONSULT NOTE ADULT - SUBJECTIVE AND OBJECTIVE BOX
History of Present Illness: The patient is an 78 year old male with a history of HL, atrial fibrillation s/p Watchman, pacemaker, CAD s/ CABG, CVA, esophageal spasm who presents with difficulty swallowing. He has also been short of breath and with a cough. No chest pain or leg swelling.    Past Medical/Surgical History:  HL, atrial fibrillation s/p Watchman, pacemaker, CAD s/ CABG, CVA, esophageal spasm    Medications:  Home Medications:  dicyclomine 10 mg oral capsule: 2 cap(s) orally once a day (23 Oct 2024 12:12)  Pepcid 20 mg oral tablet: 1 tab(s) orally once a day (23 Oct 2024 12:12)  predniSONE 10 mg oral tablet: 4 tab(s) orally once a day taper by 10mg every 3 days, currently taking 30mg qd (23 Oct 2024 12:12)      Family History: Non-contributory family history of premature cardiovascular atherosclerotic disease    Social History: No tobacco, alcohol or drug use    Review of Systems:  General: No fevers, chills, weight gain  Skin: No rashes, color changes  Cardiovascular: No chest pain, orthopnea  Respiratory: No shortness of breath, cough  Gastrointestinal: No nausea, abdominal pain  Genitourinary: No incontinence, pain with urination  Musculoskeletal: No pain, swelling, decreased range of motion  Neurological: No headache, weakness  Psychiatric: No depression, anxiety  Endocrine: No weight gain, increased thirst  All other systems are comprehensively negative.    Physical Exam:  Vitals:        Vital Signs Last 24 Hrs  T(C): 36.8 (24 Oct 2024 07:39), Max: 37.7 (23 Oct 2024 21:11)  T(F): 98.2 (24 Oct 2024 07:39), Max: 99.8 (23 Oct 2024 21:11)  HR: 75 (24 Oct 2024 04:00) (71 - 79)  BP: 48/74 (24 Oct 2024 04:00) (48/74 - 161/75)  BP(mean): 93 (24 Oct 2024 04:00) (87 - 95)  RR: 30 (24 Oct 2024 04:00) (19 - 30)  SpO2: 97% (24 Oct 2024 04:00) (84% - 97%)    Parameters below as of 24 Oct 2024 04:00  Patient On (Oxygen Delivery Method): nasal cannula  O2 Flow (L/min): 4  General: NAD  HEENT: MMM  Neck: No JVD, no carotid bruit  Lungs: CTAB  CV: RRR, nl S1/S2, no M/R/G  Abdomen: S/NT/ND, +BS  Extremities: No LE edema, no cyanosis  Neuro: AAOx3, non-focal  Skin: No rash    Labs:                        12.0   5.41  )-----------( 126      ( 24 Oct 2024 06:00 )             36.3     10-24    143  |  106  |  39[H]  ----------------------------<  94  3.4[L]   |  28  |  1.03    Ca    9.4      24 Oct 2024 06:00  Mg     2.0     10-24    TPro  6.3  /  Alb  3.2[L]  /  TBili  2.6[H]  /  DBili  x   /  AST  22  /  ALT  17  /  AlkPhos  71  10-23        PT/INR - ( 22 Oct 2024 22:09 )   PT: 15.0 sec;   INR: 1.30 ratio         PTT - ( 22 Oct 2024 22:09 )  PTT:29.3 sec    ECG/Telemetry: Atrial fibrillation, RBBB, intermittent ventricular paced rhythm

## 2024-10-24 NOTE — PROGRESS NOTE ADULT - PROBLEM SELECTOR PLAN 2
SLP noted  NPO for now  For MBS  Started on hyoscyamine   Continue iv PPI bid  Can add CCB  Will attempt to resume diet after MBS

## 2024-10-24 NOTE — PROGRESS NOTE ADULT - ASSESSMENT
78-year-old male history of esophageal motility issues not on his medications prescribed by his GI complaining of spitting up possibly aspirating having a low-grade fever 100.4 here.  Noted to have hypoxia at 89% on room air.    sepsis  ely  aspiration  asp pna  frail - weak - elderly  esophageal motility issues   ataxic gait    curr NPO  GI eval noted  on ABX  on NC o2 support    trend renal indices  I and O  replete lytes  trend Lactic Acid  emp ABX  asp prec  HOB elev  oral hygiene  skin care  monitor VS and HD and Sat  goal sat > 88 pct  Incentive Antonio if able to cooperate  CT chest -   GOC discussion  assist with needs

## 2024-10-25 LAB
ANION GAP SERPL CALC-SCNC: 8 MMOL/L — SIGNIFICANT CHANGE UP (ref 5–17)
BUN SERPL-MCNC: 43 MG/DL — HIGH (ref 7–23)
CALCIUM SERPL-MCNC: 9.6 MG/DL — SIGNIFICANT CHANGE UP (ref 8.4–10.5)
CHLORIDE SERPL-SCNC: 108 MMOL/L — SIGNIFICANT CHANGE UP (ref 96–108)
CO2 SERPL-SCNC: 28 MMOL/L — SIGNIFICANT CHANGE UP (ref 22–31)
CREAT SERPL-MCNC: 0.98 MG/DL — SIGNIFICANT CHANGE UP (ref 0.5–1.3)
EGFR: 78 ML/MIN/1.73M2 — SIGNIFICANT CHANGE UP
GLUCOSE SERPL-MCNC: 87 MG/DL — SIGNIFICANT CHANGE UP (ref 70–99)
HCT VFR BLD CALC: 38.1 % — LOW (ref 39–50)
HGB BLD-MCNC: 12.5 G/DL — LOW (ref 13–17)
MAGNESIUM SERPL-MCNC: 1.9 MG/DL — SIGNIFICANT CHANGE UP (ref 1.6–2.6)
MCHC RBC-ENTMCNC: 32.8 G/DL — SIGNIFICANT CHANGE UP (ref 32–36)
MCHC RBC-ENTMCNC: 33.7 PG — SIGNIFICANT CHANGE UP (ref 27–34)
MCV RBC AUTO: 102.7 FL — HIGH (ref 80–100)
NRBC # BLD: 0 /100 WBCS — SIGNIFICANT CHANGE UP (ref 0–0)
PLATELET # BLD AUTO: 148 K/UL — LOW (ref 150–400)
POTASSIUM SERPL-MCNC: 3.8 MMOL/L — SIGNIFICANT CHANGE UP (ref 3.5–5.3)
POTASSIUM SERPL-SCNC: 3.8 MMOL/L — SIGNIFICANT CHANGE UP (ref 3.5–5.3)
RBC # BLD: 3.71 M/UL — LOW (ref 4.2–5.8)
RBC # FLD: 14.5 % — SIGNIFICANT CHANGE UP (ref 10.3–14.5)
SODIUM SERPL-SCNC: 144 MMOL/L — SIGNIFICANT CHANGE UP (ref 135–145)
WBC # BLD: 7.77 K/UL — SIGNIFICANT CHANGE UP (ref 3.8–10.5)
WBC # FLD AUTO: 7.77 K/UL — SIGNIFICANT CHANGE UP (ref 3.8–10.5)

## 2024-10-25 PROCEDURE — 74230 X-RAY XM SWLNG FUNCJ C+: CPT | Mod: 26

## 2024-10-25 RX ORDER — METHYLPREDNISOLONE ACETATE 80 MG/ML
24 INJECTION, SUSPENSION INTRALESIONAL; INTRAMUSCULAR; INTRASYNOVIAL; SOFT TISSUE DAILY
Refills: 0 | Status: DISCONTINUED | OUTPATIENT
Start: 2024-10-25 | End: 2024-10-25

## 2024-10-25 RX ORDER — METOPROLOL TARTRATE 50 MG
5 TABLET ORAL EVERY 8 HOURS
Refills: 0 | Status: DISCONTINUED | OUTPATIENT
Start: 2024-10-25 | End: 2024-10-25

## 2024-10-25 RX ORDER — ACETAMINOPHEN 500 MG
1000 TABLET ORAL ONCE
Refills: 0 | Status: DISCONTINUED | OUTPATIENT
Start: 2024-10-25 | End: 2024-10-25

## 2024-10-25 RX ORDER — AMLODIPINE BESYLATE 10 MG
2.5 TABLET ORAL DAILY
Refills: 0 | Status: DISCONTINUED | OUTPATIENT
Start: 2024-10-25 | End: 2024-10-25

## 2024-10-25 RX ORDER — METHYLPREDNISOLONE ACETATE 80 MG/ML
20 INJECTION, SUSPENSION INTRALESIONAL; INTRAMUSCULAR; INTRASYNOVIAL; SOFT TISSUE EVERY 24 HOURS
Refills: 0 | Status: DISCONTINUED | OUTPATIENT
Start: 2024-10-25 | End: 2024-10-26

## 2024-10-25 RX ORDER — ISOSORBIDE DINITRATE 10 MG
5 TABLET ORAL
Refills: 0 | Status: DISCONTINUED | OUTPATIENT
Start: 2024-10-25 | End: 2024-10-28

## 2024-10-25 RX ORDER — SODIUM CHLORIDE 9 MG/ML
1000 INJECTION, SOLUTION INTRAMUSCULAR; INTRAVENOUS; SUBCUTANEOUS
Refills: 0 | Status: DISCONTINUED | OUTPATIENT
Start: 2024-10-25 | End: 2024-10-26

## 2024-10-25 RX ORDER — PANTOPRAZOLE SODIUM 40 MG/1
40 TABLET, DELAYED RELEASE ORAL
Refills: 0 | Status: DISCONTINUED | OUTPATIENT
Start: 2024-10-25 | End: 2024-10-29

## 2024-10-25 RX ORDER — CLONAZEPAM 1 MG
1.5 TABLET ORAL AT BEDTIME
Refills: 0 | Status: DISCONTINUED | OUTPATIENT
Start: 2024-10-25 | End: 2024-10-29

## 2024-10-25 RX ORDER — GABAPENTIN 300 MG/1
300 CAPSULE ORAL ONCE
Refills: 0 | Status: COMPLETED | OUTPATIENT
Start: 2024-10-25 | End: 2024-10-25

## 2024-10-25 RX ORDER — SUCRALFATE 1 G/10ML
1 SUSPENSION ORAL
Refills: 0 | Status: DISCONTINUED | OUTPATIENT
Start: 2024-10-25 | End: 2024-10-29

## 2024-10-25 RX ADMIN — GABAPENTIN 300 MILLIGRAM(S): 300 CAPSULE ORAL at 15:18

## 2024-10-25 RX ADMIN — SODIUM CHLORIDE 100 MILLILITER(S): 9 INJECTION, SOLUTION INTRAMUSCULAR; INTRAVENOUS; SUBCUTANEOUS at 05:17

## 2024-10-25 RX ADMIN — PIPERACILLIN AND TAZOBACTAM 25 GRAM(S): .5; 4 INJECTION, POWDER, LYOPHILIZED, FOR SOLUTION INTRAVENOUS at 12:39

## 2024-10-25 RX ADMIN — Medication 40 MILLIGRAM(S): at 05:39

## 2024-10-25 RX ADMIN — Medication 5 MILLIGRAM(S): at 17:41

## 2024-10-25 RX ADMIN — Medication 0.25 MILLIGRAM(S): at 05:39

## 2024-10-25 RX ADMIN — SODIUM CHLORIDE 100 MILLILITER(S): 9 INJECTION, SOLUTION INTRAMUSCULAR; INTRAVENOUS; SUBCUTANEOUS at 05:39

## 2024-10-25 RX ADMIN — PIPERACILLIN AND TAZOBACTAM 25 GRAM(S): .5; 4 INJECTION, POWDER, LYOPHILIZED, FOR SOLUTION INTRAVENOUS at 05:17

## 2024-10-25 RX ADMIN — SUCRALFATE 1 GRAM(S): 1 SUSPENSION ORAL at 23:34

## 2024-10-25 RX ADMIN — Medication 1.5 MILLIGRAM(S): at 23:34

## 2024-10-25 RX ADMIN — Medication 0.25 MILLIGRAM(S): at 21:35

## 2024-10-25 RX ADMIN — Medication 10 MILLIGRAM(S): at 23:35

## 2024-10-25 RX ADMIN — PIPERACILLIN AND TAZOBACTAM 25 GRAM(S): .5; 4 INJECTION, POWDER, LYOPHILIZED, FOR SOLUTION INTRAVENOUS at 20:25

## 2024-10-25 RX ADMIN — PANTOPRAZOLE SODIUM 40 MILLIGRAM(S): 40 TABLET, DELAYED RELEASE ORAL at 17:41

## 2024-10-25 RX ADMIN — METHYLPREDNISOLONE ACETATE 20 MILLIGRAM(S): 80 INJECTION, SUSPENSION INTRALESIONAL; INTRAMUSCULAR; INTRASYNOVIAL; SOFT TISSUE at 11:27

## 2024-10-25 RX ADMIN — SUCRALFATE 1 GRAM(S): 1 SUSPENSION ORAL at 17:41

## 2024-10-25 RX ADMIN — Medication 0.25 MILLIGRAM(S): at 17:40

## 2024-10-25 RX ADMIN — PANTOPRAZOLE SODIUM 40 MILLIGRAM(S): 40 TABLET, DELAYED RELEASE ORAL at 05:39

## 2024-10-25 NOTE — OCCUPATIONAL THERAPY INITIAL EVALUATION ADULT - TRANSFER TRAINING, PT EVAL
Pt will transfer using sliding board to bedside recliner/drop arm commode with Mod A x 1 1-2 sessions

## 2024-10-25 NOTE — PROGRESS NOTE ADULT - PROBLEM SELECTOR PLAN 2
SLP noted  NPO for now  For MBS today  Started on hyoscyamine   Continue iv PPI bid  Can add CCB  Will attempt to resume diet after MBS

## 2024-10-25 NOTE — SWALLOW VFSS/MBS ASSESSMENT ADULT - DIAGNOSTIC IMPRESSIONS
1) Functional oral stage of swallow for regular solids, soft and bite sized solids, moderately thick liquids, mildly thick liquids, and thin liquids marked by adequate oral containment, adequate bolus collection, adequate mastication, and timely posterior transit/transfer. Adequate oral clearance noted.  2) Mild to Moderate Pharyngeal Dysphagia for regular solids, soft and bite sized solids, moderately thick liquids, mildly thick liquids, and thin liquids marked by delayed initiation of the pharyngeal swallow trigger, reduced hyolaryngeal elevation, reduced laryngeal vestibular closure, reduced tongue base retraction, and reduced pharyngeal constriction. There is moderate stasis post swallow at the base of tongue, valleculae, and pyriforms greater for regular solids. Partial clearance is noted following secondary swallow and thin liquid wash. There was an instance of trace penetration during the swallow for thin liquids. However, full retrieval noted and this was not reduplicated given small single sips. No penetration observed on any other trials or consistencies. No aspiration observed across all trials.  3) Of note, trace retention in the cervical esophagus noted across several consistencies which was not reduced with thin liquid wash. Please note this is not a formal assessment of the esophagus.

## 2024-10-25 NOTE — SWALLOW VFSS/MBS ASSESSMENT ADULT - COMMENTS
Per charting, pt is a "This is a 78 M with PMH of HLD GI motility issues who came in c/o that he keeps regurgitating everything he eats or drinks. Patient is follows with a GI doctor. He has a h/o problems swallowing and was placed on a medication to 'relax his muscles' to help with his dysphagia. Patient says he developed a rash. Patient saw a dermatologist and was prescribed a medication. The dermatologist told him to stop the 'muscle relaxer'. He says his GI unaware that pt stopped taking his "swallowing medication". He denies SOB. In the ER he was found to have T 100.4 and SaO2 89% on RA."    CT Chest 10/23 "Multilobar pneumonia with a distribution suggestive of aspiration. Diffusely dilated fluid-filled esophagus."    Pt seen on 10/23 for initial swallow evaluation with recommendations for "NPO with short-term non oral means of nutrition/hydration" (see report for details).     Patient arrived to Radiology for MBS this AM, and received awake/alert and oriented. Patient transferred to a specialized seating unit with lateral view projection.  Patient reports that he has been having difficulty swallowing solids, with occasional regurgitation. Patient is receiving supplemental O2 via nasal cannula.     Of Note, Cervical Hardware noted at C2-C3 on  view image.

## 2024-10-25 NOTE — PROGRESS NOTE ADULT - ASSESSMENT
The patient is an 78 year old male with a history of HL, atrial fibrillation s/p Watchman, pacemaker, CAD s/ CABG, CVA, esophageal spasm who presents with difficulty swallowing.    Plan:  - ECG with atrial fibrillation and intermittent ventricular paced rhythm  - Pacemaker interrogated 6/24 with normal function and good battery life  - CT chest with multilobar PNA  - BNP 3564  - Echo with mildly reduced LV systolic function, mod MR, mod TR  - Not on anticoagulation for atrial fibrillation due to presence of left atrial appendage occlusion device  - Currently NPO  - Continue metoprolol tartrate 5 mg IV q8h  - GI follow-up  - If plan includes any GI intervention, may proceed from a cardiac standpoint

## 2024-10-25 NOTE — OCCUPATIONAL THERAPY INITIAL EVALUATION ADULT - GROOMING, PREVIOUS LEVEL OF FUNCTION, OT EVAL
Anesthesia Post-op Note    Patient: Mark Anthony Urrutia  Procedure(s) Performed: CORONARY ANGIOGRAM/POSSIBLE PTCA - CV; ECHOCARDIOGRAM, TRANSESOPHAGEAL; LEFT HEART CATH  Anesthesia type: MAC    Vitals Value Taken Time   Temp 36.3 °C (97.3 °F) 09/20/23 1107   Pulse 88 09/20/23 1330   Resp 14 09/20/23 1330   SpO2 92 % 09/20/23 1330   /77 09/20/23 1330         Patient Location: PACU Phase 2  Post-op Vital Signs:stable  Level of Consciousness: awake and oriented  Respiratory Status: spontaneous ventilation and room air  Cardiovascular blood pressure returned to baseline  Hydration: euvolemic  Pain Management: adequately controlled  Vomiting: none  Nausea: None  Airway Patency:patent  Post-op Assessment: awake, alert, appropriately conversant, or baseline, no complications and patient tolerated procedure well      No notable events documented.   needed assist

## 2024-10-25 NOTE — PROGRESS NOTE ADULT - ASSESSMENT
78-year-old male history of esophageal motility issues not on his medications prescribed by his GI complaining of spitting up possibly aspirating having a low-grade fever 100.4 here.  Noted to have hypoxia at 89% on room air.    sepsis  ely  aspiration  asp pna  frail - weak - elderly  esophageal motility issues   ataxic gait    curr NPO  GI eval noted  on ABX  on NC o2 support  cardio eval noted    trend renal indices  I and O  replete lytes  trend Lactic Acid  emp ABX  asp prec  HOB elev  oral hygiene  skin care  monitor VS and HD and Sat  goal sat > 88 pct  Incentive Naselle if able to cooperate  CT chest -   GOC discussion  assist with needs

## 2024-10-25 NOTE — OCCUPATIONAL THERAPY INITIAL EVALUATION ADULT - ADDITIONAL COMMENTS
Lives in house with 24/7 aide.  No stairs.  Has motorized scooter. Lives in house with 24/7 aide.  No stairs.  Has motorized scooter. Uses sliding board to transfers. Does not ambulate.

## 2024-10-25 NOTE — SWALLOW VFSS/MBS ASSESSMENT ADULT - DEMONSTRATES NEED FOR REFERRAL TO ANOTHER SERVICE
Recommend GI consult given history of esophageal dysphagia and retention noted in the cervical esophagus/Registered Dietitian/GI

## 2024-10-25 NOTE — SWALLOW VFSS/MBS ASSESSMENT ADULT - RECOMMENDED FEEDING/EATING TECHNIQUES
slow pace; double swallow to reduce residue/allow for swallow between intakes/crush medication (when feasible)/hard swallow w/ each bite or sip/maintain upright posture during/after eating for 30 mins/oral hygiene/position upright (90 degrees)/provide rest periods between swallows/small sips/bites

## 2024-10-25 NOTE — PROGRESS NOTE ADULT - ASSESSMENT
Esophageal Dysphagia  Esophageal Dysmotility     Recommendations:  - Suspect pt has underlying esophageal dysmotility possible diffuse esophageal spasm. He was reportedly placed on a medication to relax his esophagus ?CCB but caused a rash  - swallow evaluation today, may adat per s/s recs   - c/w hyoscyamine  - Can add CCB if needed   - PPI 40mg IV BID  - Carafate QID  - Will follow with you    Neto Pro M.D.  [facility]  [phone]

## 2024-10-25 NOTE — CASE MANAGEMENT PROGRESS NOTE - NSCMPROGRESSNOTE_GEN_ALL_CORE
Update Communication Note: As per morning rounds, Patient CMS STAR. Patient lives in [place] visiting. DX: Aspiration PNA on IV Zosyn, patient on 2 liters nasal cannula of oxygen , patient failed speech and swallow . Pending MBS Today. Patient NPO. ? Peg Tube. Patient has Condom Cath. Patient has a rash started patient back on Steroids. Patient not ready for Discharge will continue to follow patient.

## 2024-10-26 LAB
ALBUMIN SERPL ELPH-MCNC: 2.6 G/DL — LOW (ref 3.3–5)
ALP SERPL-CCNC: 57 U/L — SIGNIFICANT CHANGE UP (ref 30–120)
ALT FLD-CCNC: 17 U/L — SIGNIFICANT CHANGE UP (ref 10–60)
ANION GAP SERPL CALC-SCNC: 7 MMOL/L — SIGNIFICANT CHANGE UP (ref 5–17)
AST SERPL-CCNC: 18 U/L — SIGNIFICANT CHANGE UP (ref 10–40)
BILIRUB SERPL-MCNC: 1.8 MG/DL — HIGH (ref 0.2–1.2)
BUN SERPL-MCNC: 37 MG/DL — HIGH (ref 7–23)
CALCIUM SERPL-MCNC: 9.2 MG/DL — SIGNIFICANT CHANGE UP (ref 8.4–10.5)
CHLORIDE SERPL-SCNC: 104 MMOL/L — SIGNIFICANT CHANGE UP (ref 96–108)
CO2 SERPL-SCNC: 28 MMOL/L — SIGNIFICANT CHANGE UP (ref 22–31)
CREAT SERPL-MCNC: 0.91 MG/DL — SIGNIFICANT CHANGE UP (ref 0.5–1.3)
EGFR: 85 ML/MIN/1.73M2 — SIGNIFICANT CHANGE UP
GLUCOSE SERPL-MCNC: 110 MG/DL — HIGH (ref 70–99)
HCT VFR BLD CALC: 36.2 % — LOW (ref 39–50)
HGB BLD-MCNC: 11.9 G/DL — LOW (ref 13–17)
MAGNESIUM SERPL-MCNC: 2 MG/DL — SIGNIFICANT CHANGE UP (ref 1.6–2.6)
MCHC RBC-ENTMCNC: 32.9 G/DL — SIGNIFICANT CHANGE UP (ref 32–36)
MCHC RBC-ENTMCNC: 33.3 PG — SIGNIFICANT CHANGE UP (ref 27–34)
MCV RBC AUTO: 101.4 FL — HIGH (ref 80–100)
NRBC # BLD: 0 /100 WBCS — SIGNIFICANT CHANGE UP (ref 0–0)
PLATELET # BLD AUTO: 152 K/UL — SIGNIFICANT CHANGE UP (ref 150–400)
POTASSIUM SERPL-MCNC: 3.3 MMOL/L — LOW (ref 3.5–5.3)
POTASSIUM SERPL-SCNC: 3.3 MMOL/L — LOW (ref 3.5–5.3)
PROT SERPL-MCNC: 5.7 G/DL — LOW (ref 6–8.3)
RBC # BLD: 3.57 M/UL — LOW (ref 4.2–5.8)
RBC # FLD: 14 % — SIGNIFICANT CHANGE UP (ref 10.3–14.5)
SODIUM SERPL-SCNC: 139 MMOL/L — SIGNIFICANT CHANGE UP (ref 135–145)
WBC # BLD: 10.5 K/UL — SIGNIFICANT CHANGE UP (ref 3.8–10.5)
WBC # FLD AUTO: 10.5 K/UL — SIGNIFICANT CHANGE UP (ref 3.8–10.5)

## 2024-10-26 PROCEDURE — 70450 CT HEAD/BRAIN W/O DYE: CPT | Mod: 26

## 2024-10-26 RX ORDER — HALOPERIDOL DECANOATE 50 MG/ML
1 INJECTION INTRAMUSCULAR EVERY 6 HOURS
Refills: 0 | Status: DISCONTINUED | OUTPATIENT
Start: 2024-10-26 | End: 2024-10-29

## 2024-10-26 RX ORDER — GABAPENTIN 300 MG/1
100 CAPSULE ORAL DAILY
Refills: 0 | Status: DISCONTINUED | OUTPATIENT
Start: 2024-10-26 | End: 2024-10-29

## 2024-10-26 RX ORDER — ALBUTEROL 90 MCG
2 AEROSOL (GRAM) INHALATION EVERY 6 HOURS
Refills: 0 | Status: DISCONTINUED | OUTPATIENT
Start: 2024-10-26 | End: 2024-10-29

## 2024-10-26 RX ORDER — POTASSIUM CHLORIDE 10 MEQ
10 TABLET, EXTENDED RELEASE ORAL THREE TIMES A DAY
Refills: 0 | Status: DISCONTINUED | OUTPATIENT
Start: 2024-10-26 | End: 2024-10-27

## 2024-10-26 RX ORDER — LACTOBACILLUS ACIDOPHILUS 25MM CELL
1 CAPSULE ORAL DAILY
Refills: 0 | Status: DISCONTINUED | OUTPATIENT
Start: 2024-10-26 | End: 2024-10-29

## 2024-10-26 RX ORDER — HALOPERIDOL DECANOATE 50 MG/ML
2 INJECTION INTRAMUSCULAR ONCE
Refills: 0 | Status: COMPLETED | OUTPATIENT
Start: 2024-10-26 | End: 2024-10-26

## 2024-10-26 RX ORDER — QUETIAPINE FUMARATE 200 MG/1
12.5 TABLET ORAL AT BEDTIME
Refills: 0 | Status: DISCONTINUED | OUTPATIENT
Start: 2024-10-26 | End: 2024-10-27

## 2024-10-26 RX ORDER — MELATONIN 5 MG
5 TABLET ORAL AT BEDTIME
Refills: 0 | Status: DISCONTINUED | OUTPATIENT
Start: 2024-10-26 | End: 2024-10-29

## 2024-10-26 RX ORDER — LORAZEPAM 2 MG
2 TABLET ORAL ONCE
Refills: 0 | Status: DISCONTINUED | OUTPATIENT
Start: 2024-10-26 | End: 2024-10-26

## 2024-10-26 RX ORDER — POTASSIUM CHLORIDE 10 MEQ
10 TABLET, EXTENDED RELEASE ORAL THREE TIMES A DAY
Refills: 0 | Status: DISCONTINUED | OUTPATIENT
Start: 2024-10-26 | End: 2024-10-26

## 2024-10-26 RX ADMIN — METHYLPREDNISOLONE ACETATE 20 MILLIGRAM(S): 80 INJECTION, SUSPENSION INTRALESIONAL; INTRAMUSCULAR; INTRASYNOVIAL; SOFT TISSUE at 09:05

## 2024-10-26 RX ADMIN — SODIUM CHLORIDE 50 MILLILITER(S): 9 INJECTION, SOLUTION INTRAMUSCULAR; INTRAVENOUS; SUBCUTANEOUS at 05:00

## 2024-10-26 RX ADMIN — Medication 10 MILLIEQUIVALENT(S): at 17:14

## 2024-10-26 RX ADMIN — Medication 0.25 MILLIGRAM(S): at 14:13

## 2024-10-26 RX ADMIN — PANTOPRAZOLE SODIUM 40 MILLIGRAM(S): 40 TABLET, DELAYED RELEASE ORAL at 17:14

## 2024-10-26 RX ADMIN — Medication 10 MILLIGRAM(S): at 22:35

## 2024-10-26 RX ADMIN — Medication 5 MILLIGRAM(S): at 17:14

## 2024-10-26 RX ADMIN — GABAPENTIN 100 MILLIGRAM(S): 300 CAPSULE ORAL at 17:18

## 2024-10-26 RX ADMIN — PIPERACILLIN AND TAZOBACTAM 25 GRAM(S): .5; 4 INJECTION, POWDER, LYOPHILIZED, FOR SOLUTION INTRAVENOUS at 21:14

## 2024-10-26 RX ADMIN — Medication 40 MILLIGRAM(S): at 04:59

## 2024-10-26 RX ADMIN — Medication 1 TABLET(S): at 17:19

## 2024-10-26 RX ADMIN — HALOPERIDOL DECANOATE 2 MILLIGRAM(S): 50 INJECTION INTRAMUSCULAR at 02:26

## 2024-10-26 RX ADMIN — PIPERACILLIN AND TAZOBACTAM 25 GRAM(S): .5; 4 INJECTION, POWDER, LYOPHILIZED, FOR SOLUTION INTRAVENOUS at 12:16

## 2024-10-26 RX ADMIN — SUCRALFATE 1 GRAM(S): 1 SUSPENSION ORAL at 12:17

## 2024-10-26 RX ADMIN — QUETIAPINE FUMARATE 12.5 MILLIGRAM(S): 200 TABLET ORAL at 22:35

## 2024-10-26 RX ADMIN — SUCRALFATE 1 GRAM(S): 1 SUSPENSION ORAL at 23:15

## 2024-10-26 RX ADMIN — Medication 2 MILLIGRAM(S): at 03:56

## 2024-10-26 RX ADMIN — SUCRALFATE 1 GRAM(S): 1 SUSPENSION ORAL at 17:14

## 2024-10-26 RX ADMIN — PIPERACILLIN AND TAZOBACTAM 25 GRAM(S): .5; 4 INJECTION, POWDER, LYOPHILIZED, FOR SOLUTION INTRAVENOUS at 05:00

## 2024-10-26 RX ADMIN — Medication 5 MILLIGRAM(S): at 22:35

## 2024-10-26 RX ADMIN — Medication 1.5 MILLIGRAM(S): at 22:34

## 2024-10-26 RX ADMIN — SODIUM CHLORIDE 50 MILLILITER(S): 9 INJECTION, SOLUTION INTRAMUSCULAR; INTRAVENOUS; SUBCUTANEOUS at 14:14

## 2024-10-26 RX ADMIN — Medication 0.25 MILLIGRAM(S): at 22:34

## 2024-10-26 NOTE — CONSULT NOTE ADULT - SUBJECTIVE AND OBJECTIVE BOX
Patient is a 78y old  Male who presents with a chief complaint of Asp PNA (26 Oct 2024 15:08)     HPI: This is a 78 M with PMH of HLD GI motility issues who came in on 10/22 c/o that he keeps regurgitating everything he eats or drinks. Patient follows with a GI doctor. He has a h/o problems swallowing and was placed on a  dicyclomine to help with his dysphagia. Patient says he developed a rash. Patient saw a dermatologist and was prescribed prednisone. The dermatologist told him to stop the dicyclomine. He says his GI isunaware that pt stopped taking his dicyclomine. He denies SOB. In the ER he was found to have T 100.4 and SaO2 89% on RA.    No Facial asymmetry  No lateralized weakness.  No Fall LOC or seizure activity.  No Droopy eye lids.  No nasal tone of Voice.     DRAFT     PAST MEDICAL & SURGICAL HISTORY:      Hypercholesterolemia,     GERD,     Dysphagia,     Anxiety    MEDICATIONS  (STANDING):    atorvastatin 10 milliGRAM(s) Oral at bedtime  clonazePAM  Tablet 1.5 milliGRAM(s) Oral at bedtime  enoxaparin Injectable 40 milliGRAM(s) SubCutaneous every 24 hours  gabapentin 100 milliGRAM(s) Oral daily  hyoscyamine SL 0.25 milliGRAM(s) SubLingual three times a day  isosorbide   dinitrate Tablet (ISORDIL) 5 milliGRAM(s) Oral two times a day  lactobacillus acidophilus 1 Tablet(s) Oral daily  melatonin 5 milliGRAM(s) Oral at bedtime  pantoprazole    Tablet 40 milliGRAM(s) Oral two times a day  piperacillin/tazobactam IVPB.. 3.375 Gram(s) IV Intermittent every 8 hours  potassium chloride    Tablet ER 10 milliEquivalent(s) Oral three times a day  QUEtiapine 12.5 milliGRAM(s) Oral at bedtime  sucralfate 1 Gram(s) Oral four times a day    MEDICATIONS  (PRN):    albuterol    90 MICROgram(s) HFA Inhaler 2 Puff(s) Inhalation every 6 hours PRN Shortness of Breath and/or Wheezing  haloperidol    Injectable 1 milliGRAM(s) IntraMuscular every 6 hours PRN Agitation  ondansetron Injectable 4 milliGRAM(s) IV Push every 6 hours PRN Nausea and/or Vomiting      Allergies    No Known Allergies    SOCIAL HISTORY:    No h/o Smoking.   No h/o alcohol use.  Ex Smoker. Quite in past.  Pt smokes.  Pt does drink socially.  Pt drinks alcohol heavily.    FAMILY HISTORY: Asked the pt. N/C    REVIEW OF SYSTEMS:    CONSTITUTIONAL: No fever  EYES: No eye pain,   ENMT:  No sinus or throat pain  NECK: No pain or stiffness  RESPIRATORY: No cough, No hemoptysis; No shortness of breath  CARDIOVASCULAR: No acute chest pain, palpitations,  or leg swelling  GASTROINTESTINAL: No abdominal pain. No nausea, vomiting, or hematemesis;  GERD, Dysphagia  GENITOURINARY: No  hematuria, or incontinence  MUSCULOSKELETAL: No joint swelling; No extremity pain  SKIN: No itching, rashes, or lesions   LYMPH NODES: No enlarged glands  NEUROLOGICAL: No headaches, memory loss,   PSYCHIATRIC: No depression, anxiety, mood swings, or difficulty sleeping  ENDOCRINE: No heat or cold intolerance;   HEME/LYMPH: No easy bruising, or bleeding gums  Allergy/Immunology. No medication allergy. No seasonal allergies.    PHYSICAL EXAM:  Vital Signs Last 24 Hrs  T(F): 97.6 (10-26-24 @ 10:32)  HR: 62 (10-26-24 @ 04:16)  BP: 163/84 (10-26-24 @ 04:16)  RR: 18 (10-26-24 @ 04:16)    GENERAL: NAD, well-groomed, well-developed  HEAD:  Atraumatic, Normocephalic  EYES: EOMI, PERRLA, conjunctiva and sclera clear  NECK: Supple, No JVD    On Neurological Examination:    Mental Status - Pt is alert, awake, oriented X3. Higher functions are intact.  Follows commands well and able to answer questions appropriately.    Speech -  Normal. Pt has no aphasia.    Cranial Nerves - Pupils 3 mm equal and reactive to light, extraocular eye movements intact. Pt has no visual field deficit. No facial asymmetry. Tongue - is in midline.    Motor Exam - 4 plus/5 all over, No drift. No shaking or tremors.    Sensory Exam - Pt withdraws all extremities equally on stimulation. No asymmetry seen. No complaints of tingling, numbness.    Gait -     Deep tendon Reflexes - 2 plus all over.    Coordination - Fine finger movements are normal on both sides. Finger to nose is also normal on both sides.       Neck Supple -  Yes.    LABS:                        11.9   10.50 )-----------( 152      ( 26 Oct 2024 05:30 )             36.2     10-26    139  |  104  |  37[H]  ----------------------------<  110[H]  3.3[L]   |  28  |  0.91    Ca    9.2      26 Oct 2024 05:30  Mg     2.0     10-26    TPro  5.7[L]  /  Alb  2.6[L]  /  TBili  1.8[H]  /  DBili  x   /  AST  18  /  ALT  17  /  AlkPhos  57  10-26    Urinalysis Basic - ( 26 Oct 2024 05:30 )    Color: x / Appearance: x / SG: x / pH: x  Gluc: 110 mg/dL / Ketone: x  / Bili: x / Urobili: x   Blood: x / Protein: x / Nitrite: x   Leuk Esterase: x / RBC: x / WBC x   Sq Epi: x / Non Sq Epi: x / Bacteria: x    RADIOLOGY & ADDITIONAL STUDIES:    < from: CT Chest No Cont (10.23.24 @ 10:11) >    Multilobar pneumonia with a distribution suggestive of aspiration.    Diffusely dilated fluid-filled esophagus.    Partially included fat stranding in the abdomen, most concentrated in the retroperitoneum. Consider pancreatitis; correlate with pancreatic enzymes.    < end of copied text >    < from: Xray Esophagram Single Contrast (10.23.24 @ 15:07) >    IMPRESSION:    Limited study demonstrates no peristalsis through the esophagus.  Suspected retained food within the esophagus, without evidence of obstruction as described above.    < end of copied text >     Patient is a 78y old  Male who presents with a chief complaint of Asp PNA (26 Oct 2024 15:08)     HPI: This is a 78 M with PMH of HLD GI motility issues who came in on 10/22 c/o that he keeps regurgitating everything he eats or drinks. Patient follows with a GI doctor. He has a h/o problems swallowing and was placed on a  dicyclomine to help with his dysphagia. Patient says he developed a rash. Patient saw a dermatologist and was prescribed prednisone. The dermatologist told him to stop the dicyclomine. He says his GI isunaware that pt stopped taking his dicyclomine. He denies SOB. In the ER he was found to have T 100.4 and SaO2 89% on RA.    No Facial asymmetry  No lateralized weakness.  No Fall LOC or seizure activity.  No Droopy eye lids. 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     Pt says that he has h/o Congential Cervical stenosis and became paraplegic sec to Cervical Myelopathy    Pt is retired .     PAST MEDICAL & SURGICAL HISTORY:      Hypercholesterolemia,     GERD,     Dysphagia,     Anxiety    MEDICATIONS  (STANDING):    atorvastatin 10 milliGRAM(s) Oral at bedtime  clonazePAM  Tablet 1.5 milliGRAM(s) Oral at bedtime  enoxaparin Injectable 40 milliGRAM(s) SubCutaneous every 24 hours  gabapentin 100 milliGRAM(s) Oral daily  hyoscyamine SL 0.25 milliGRAM(s) SubLingual three times a day  isosorbide   dinitrate Tablet (ISORDIL) 5 milliGRAM(s) Oral two times a day  lactobacillus acidophilus 1 Tablet(s) Oral daily  melatonin 5 milliGRAM(s) Oral at bedtime  pantoprazole    Tablet 40 milliGRAM(s) Oral two times a day  piperacillin/tazobactam IVPB.. 3.375 Gram(s) IV Intermittent every 8 hours  potassium chloride    Tablet ER 10 milliEquivalent(s) Oral three times a day  QUEtiapine 12.5 milliGRAM(s) Oral at bedtime  sucralfate 1 Gram(s) Oral four times a day    MEDICATIONS  (PRN):    albuterol    90 MICROgram(s) HFA Inhaler 2 Puff(s) Inhalation every 6 hours PRN Shortness of Breath and/or Wheezing  haloperidol    Injectable 1 milliGRAM(s) IntraMuscular every 6 hours PRN Agitation  ondansetron Injectable 4 milliGRAM(s) IV Push every 6 hours PRN Nausea and/or Vomiting      Allergies    No Known Allergies    SOCIAL HISTORY:    No h/o Smoking.   No h/o alcohol use.    FAMILY HISTORY: Asked the pt. N/C    REVIEW OF SYSTEMS:    CONSTITUTIONAL: No fever  EYES: No eye pain,   ENMT:  No sinus or throat pain  NECK: No pain or stiffness  RESPIRATORY: No cough, No hemoptysis; No shortness of breath  CARDIOVASCULAR: No acute chest pain, palpitations,  or leg swelling  GASTROINTESTINAL: No abdominal pain. 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 No medication allergy. No seasonal allergies.    PHYSICAL EXAM:  Vital Signs Last 24 Hrs  T(F): 97.6 (10-26-24 @ 10:32)  HR: 62 (10-26-24 @ 04:16)  BP: 163/84 (10-26-24 @ 04:16)  RR: 18 (10-26-24 @ 04:16)    GENERAL: NAD, well-groomed, well-developed  HEAD:  Atraumatic, Normocephalic  EYES: EOMI, PERRLA, conjunctiva and sclera clear  NECK: Supple, No JVD    On Neurological Examination:    Mental Status - Pt is alert, awake, oriented X3. Higher functions are intact.  Follows commands well and able to answer questions appropriately.    Speech -  Normal. Pt has no aphasia.    Cranial Nerves - Pupils 3 mm equal and reactive to light, extraocular eye movements intact. Pt has no visual field deficit.     Motor Exam - 3+/5 in B/L UE. LE  - Able to wiggles toes.      Deep tendon Reflexes - 2 plus all over.    Neck Supple -  Yes.    LABS:                        11.9   10.50 )-----------( 152      ( 26 Oct 2024 05:30 )             36.2     10-26    139  |  104  |  37[H]  ----------------------------<  110[H]  3.3[L]   |  28  |  0.91    Ca    9.2      26 Oct 2024 05:30  Mg     2.0     10-26    TPro  5.7[L]  /  Alb  2.6[L]  /  TBili  1.8[H]  /  DBili  x   /  AST  18  /  ALT  17  /  AlkPhos  57  10-26    Urinalysis Basic - ( 26 Oct 2024 05:30 )    Color: x / Appearance: x / SG: x / pH: x  Gluc: 110 mg/dL / Ketone: x  / Bili: x / Urobili: x   Blood: x / Protein: x / Nitrite: x   Leuk Esterase: x / RBC: x / WBC x   Sq Epi: x / Non Sq Epi: x / Bacteria: x    RADIOLOGY & ADDITIONAL STUDIES:    < from: CT Chest No Cont (10.23.24 @ 10:11) >    Multilobar pneumonia with a distribution suggestive of aspiration.    Diffusely dilated fluid-filled esophagus.    Partially included fat stranding in the abdomen, most concentrated in the retroperitoneum. Consider pancreatitis; correlate with pancreatic enzymes.    < end of copied text >    < from: Xray Esophagram Single Contrast (10.23.24 @ 15:07) >    IMPRESSION:    Limited study demonstrates no peristalsis through the esophagus.  Suspected retained food within the esophagus, without evidence of obstruction as described above.    < end of copied text >

## 2024-10-26 NOTE — PROGRESS NOTE ADULT - ASSESSMENT
The patient is an 78 year old male with a history of HL, atrial fibrillation s/p Watchman, pacemaker, CAD s/ CABG, CVA, esophageal spasm who presents with difficulty swallowing.    10/26/24  Seen at [facility]  Lying flat, comfortably    Plan:  - ECG with atrial fibrillation and intermittent ventricular paced rhythm  - Pacemaker interrogated 6/24 with normal function and good battery life  - CT chest with multilobar pneumonia  - BNP 3564  - Echo with mildly reduced LV systolic function, mod MR, mod TR-see above  - Not on anticoagulation for atrial fibrillation due to presence of left atrial appendage occlusion device  - Currently NPO  - Continue metoprolol tartrate 5 mg IV q8h  - GI follow-up  - If plan includes any GI intervention, may proceed from a cardiac standpoint The patient is an 78 year old male with a history of HL, atrial fibrillation s/p Watchman, pacemaker, CAD s/ CABG, CVA, esophageal spasm who presents with difficulty swallowing.    10/26/24  Seen at [facility]  Lying flat, comfortably  Potassium-3.3    Plan:  - ECG with atrial fibrillation and intermittent ventricular paced rhythm  - Pacemaker interrogated 6/24 with normal function and good battery life  - CT chest with multilobar pneumonia  - BNP 3564  - Echo with mildly reduced LV systolic function, mod MR, mod TR-see above  - Not on anticoagulation for atrial fibrillation due to presence of left atrial appendage occlusion device  - Currently NPO  - K-supps and follow labs  - Continue metoprolol tartrate 5 mg IV q8h  - GI follow-up  - If plan includes any GI intervention, may proceed from a cardiac standpoint

## 2024-10-26 NOTE — CHART NOTE - NSCHARTNOTEFT_GEN_A_CORE
Assessment: Per HPI:  This is a 78 M with PMH of HLD GI motility issues who came in c/o that he keeps regurgitating everything he eats or drinks. Patient is follows with a GI doctor. He has a h/o problems swallowing and was placed on a  dicyclomine to help with his dysphagia. Patient says he developed a rash. Patient saw a dermatologist and was prescribed prednisone. The dermatologist told him to stop the dicyclomine. He says his GI unaware that pt stopped taking his dicyclomine. He denies SOB. In the ER he was found to have T 100.4 and SaO2 89% on RA.     10/26  Pt seen for nutrition follow-up. Visited patient in room, confused. Previously NPO x4 days, seen by Speech-Language Pathologist/MBS on 10/25 recommended Soft and bite sized/thin liquid diet. presents with  appetite/po intake, consuming >% of meals. Denies n/v/d/c, no BM noted. Pertinent medications/nutrition labs reviewed; receiving IVF in house. Plan to c/w current diet. RD to continue to monitor nutrition status per protocol.      Factors impacting intake: [ ] none [ ] nausea  [ ] vomiting [ ] diarrhea [ ] constipation  [ ]chewing problems [ ] swallowing issues  [ ] other:     Diet Prescription: DASH/soft and bite sized  Intake:     Daily Weight in k.4 (26 Oct 2024 04:16)  Weight in k (25 Oct 2024 05:01)  Weight in k (24 Oct 2024 04:00)  Weight in k.2 (23 Oct 2024 07:15)  weight fluctuated likely secondary to fluid shift, noted edema in house. will continue to monitor weight trends as able   % Weight Change    Pertinent Medications: MEDICATIONS  (STANDING):  atorvastatin 10 milliGRAM(s) Oral at bedtime  clonazePAM  Tablet 1.5 milliGRAM(s) Oral at bedtime  enoxaparin Injectable 40 milliGRAM(s) SubCutaneous every 24 hours  hyoscyamine SL 0.25 milliGRAM(s) SubLingual three times a day  isosorbide   dinitrate Tablet (ISORDIL) 5 milliGRAM(s) Oral two times a day  methylPREDNISolone sodium succinate Injectable 20 milliGRAM(s) IV Push every 24 hours  pantoprazole    Tablet 40 milliGRAM(s) Oral two times a day  piperacillin/tazobactam IVPB.. 3.375 Gram(s) IV Intermittent every 8 hours  sodium chloride 0.9%. 1000 milliLiter(s) (50 mL/Hr) IV Continuous <Continuous>  sucralfate 1 Gram(s) Oral four times a day    MEDICATIONS  (PRN):  ondansetron Injectable 4 milliGRAM(s) IV Push every 6 hours PRN Nausea and/or Vomiting    Pertinent Labs: 10-26 Na139 mmol/L Glu 110 mg/dL[H] K+ 3.3 mmol/L[L] Cr  0.91 mg/dL BUN 37 mg/dL[H] 10-26 Alb 2.6 g/dL[L]     CAPILLARY BLOOD GLUCOSE          Edema per flowsheets: +4 bl ankle  Skin: free of pressure injury       Estimated Needs:   [ x] no change since previous assessment  [ ] recalculated:     Previous Nutrition Diagnosis:   [ ] Inadequate Energy Intake [ x]Inadequate Oral Intake [ ] Excessive Energy Intake   [ ] Underweight [ ] Increased Nutrient Needs [ ] Overweight/Obesity [ ] Altered Nutrition related lab values  [ ] Altered GI Function [ ] Unintended Weight Loss [ ] Food & Nutrition Related Knowledge Deficit [ ] Malnutrition     Nutrition Diagnosis is [ x] ongoing  [ ] resolved [ ] not applicable     New Nutrition Diagnosis: [x ] not applicable       Interventions:   Recommend  [ ] Change Diet To:  [ ] Nutrition Supplement  [ ] Nutrition Support  [ ] Other:     Monitoring and Evaluation:   [X ] Intake [ x ] Tolerance to diet prescription [ x ] weights [ x ] labs[ x ] follow up per protocol  [ ] other: Assessment: Per HPI:  This is a 78 M with PMH of HLD GI motility issues who came in c/o that he keeps regurgitating everything he eats or drinks. Patient is follows with a GI doctor. He has a h/o problems swallowing and was placed on a  dicyclomine to help with his dysphagia. Patient says he developed a rash. Patient saw a dermatologist and was prescribed prednisone. The dermatologist told him to stop the dicyclomine. He says his GI unaware that pt stopped taking his dicyclomine. He denies SOB. In the ER he was found to have T 100.4 and SaO2 89% on RA.     10/26  Pt seen for nutrition follow-up. Visited patient in room, confused. Previously NPO x4 days, seen by Speech-Language Pathologist/MBS on 10/25 recommended Soft and bite sized/thin liquid diet. Per RN presents with fair intake yesterday, nut poor appetite/po intake today, consuming <50% of meals. Denies n/v/d/c, no BM noted. Pertinent medications/nutrition labs reviewed; receiving IVF in house. Plan to c/w current diet. Recommend adding Ensure High Protein plus 8oz (350 kcal, 20g protein) bid to optimize intake. RD to continue to monitor nutrition status per protocol.      Factors impacting intake: [ ] none [ ] nausea  [ ] vomiting [ ] diarrhea [ ] constipation  [ ]chewing problems [ ] swallowing issues  [x ] other: AMS, lack of appetite    Diet Prescription: DASH/soft and bite sized  Intake: NPO x4 days, then po poor    Daily Weight in k.4 (26 Oct 2024 04:16)  Weight in k (25 Oct 2024 05:01)  Weight in k (24 Oct 2024 04:00)  Weight in k.2 (23 Oct 2024 07:15)  weight fluctuated likely secondary to fluid shift, noted edema in house. will continue to monitor weight trends as able   % Weight Change    Pertinent Medications: MEDICATIONS  (STANDING):  atorvastatin 10 milliGRAM(s) Oral at bedtime  clonazePAM  Tablet 1.5 milliGRAM(s) Oral at bedtime  enoxaparin Injectable 40 milliGRAM(s) SubCutaneous every 24 hours  hyoscyamine SL 0.25 milliGRAM(s) SubLingual three times a day  isosorbide   dinitrate Tablet (ISORDIL) 5 milliGRAM(s) Oral two times a day  methylPREDNISolone sodium succinate Injectable 20 milliGRAM(s) IV Push every 24 hours  pantoprazole    Tablet 40 milliGRAM(s) Oral two times a day  piperacillin/tazobactam IVPB.. 3.375 Gram(s) IV Intermittent every 8 hours  sodium chloride 0.9%. 1000 milliLiter(s) (50 mL/Hr) IV Continuous <Continuous>  sucralfate 1 Gram(s) Oral four times a day    MEDICATIONS  (PRN):  ondansetron Injectable 4 milliGRAM(s) IV Push every 6 hours PRN Nausea and/or Vomiting    Pertinent Labs: 10-26 Na139 mmol/L Glu 110 mg/dL[H] K+ 3.3 mmol/L[L] Cr  0.91 mg/dL BUN 37 mg/dL[H] 10-26 Alb 2.6 g/dL[L]     CAPILLARY BLOOD GLUCOSE          Edema per flowsheets: +4 bl ankle  Skin: free of pressure injury       Estimated Needs:   [ x] no change since previous assessment  [ ] recalculated:     Previous Nutrition Diagnosis:   [ ] Inadequate Energy Intake [ x]Inadequate Oral Intake [ ] Excessive Energy Intake   [ ] Underweight [ ] Increased Nutrient Needs [ ] Overweight/Obesity [ ] Altered Nutrition related lab values  [ ] Altered GI Function [ ] Unintended Weight Loss [ ] Food & Nutrition Related Knowledge Deficit [ ] Malnutrition     Nutrition Diagnosis is [ x] ongoing  [ ] resolved [ ] not applicable     New Nutrition Diagnosis: [x ] not applicable       Interventions: Plan to c/w current diet. Recommend adding Ensure High Protein plus 8oz (350 kcal, 20g protein) bid to optimize intake. RD to continue to monitor nutrition status per protocol.    Recommend  [ ] Change Diet To:  [ ] Nutrition Supplement  [ ] Nutrition Support  [ x] Other: Diet modified and sent to MD via teams    Monitoring and Evaluation:   [X ] Intake [ x ] Tolerance to diet prescription [ x ] weights [ x ] labs[ x ] follow up per protocol  [ ] other:

## 2024-10-26 NOTE — PROGRESS NOTE ADULT - ASSESSMENT
78-year-old male history of esophageal motility issues not on his medications prescribed by his GI complaining of spitting up possibly aspirating having a low-grade fever 100.4 here.  Noted to have hypoxia at 89% on room air.    sepsis  ely  aspiration  asp pna  frail - weak - elderly  esophageal motility issues   ataxic gait    remains a risk for aspiration  on ABX  vs noted  swallow studies noted    trend renal indices  I and O  replete lytes  trend Lactic Acid  emp ABX  asp prec  HOB elev  oral hygiene  skin care  monitor VS and HD and Sat  goal sat > 88 pct  Incentive Antonio if able to cooperate  CT chest -   GOC discussion  assist with needs

## 2024-10-26 NOTE — PROGRESS NOTE ADULT - PROBLEM SELECTOR PLAN 2
SLP noted  For MBS yesterday  Started on hyoscyamine   Continue iv PPI bid  Can add CCB  now on diet

## 2024-10-26 NOTE — CONSULT NOTE ADULT - ASSESSMENT
78 M with PMH of HLD GI motility issues who came in on 10/22 c/o that he keeps regurgitating everything he eats or drinks. Patient follows with a GI doctor. He has a h/o problems swallowing and was placed on a  dicyclomine to help with his dysphagia.   GI/SLP follow.  Found to have aspiration PNA.  Continue Antibiotics. ID eval noted.     No Facial asymmetry  No lateralized weakness.  No Fall LOC or seizure activity.  No Droopy eye lids. 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     Pt says that he has h/o Congential Cervical stenosis and became paraplegic sec to Cervical Myelopathy.    Unlikely to be Myasthenia Gravis but would get Acetylcholine receptor antibody    Aspiration precautions.    D/w pt. Questions answered.  Would follow.

## 2024-10-26 NOTE — PROGRESS NOTE ADULT - ASSESSMENT
Esophageal Dysphagia  Esophageal Dysmotility     Recommendations:  - Suspect pt has underlying esophageal dysmotility possible diffuse esophageal spasm. He was reportedly placed on a medication to relax his esophagus ?CCB but caused a rash  - aurora per slp  - c/w hyoscyamine  - Can add CCB if needed   - PPI 40mg IV BID  - Carafate QID  - Will follow with you      [facility]  [phone]

## 2024-10-27 DIAGNOSIS — R41.82 ALTERED MENTAL STATUS, UNSPECIFIED: ICD-10-CM

## 2024-10-27 LAB
ANION GAP SERPL CALC-SCNC: 5 MMOL/L — SIGNIFICANT CHANGE UP (ref 5–17)
BUN SERPL-MCNC: 40 MG/DL — HIGH (ref 7–23)
CALCIUM SERPL-MCNC: 9.1 MG/DL — SIGNIFICANT CHANGE UP (ref 8.4–10.5)
CHLORIDE SERPL-SCNC: 107 MMOL/L — SIGNIFICANT CHANGE UP (ref 96–108)
CO2 SERPL-SCNC: 29 MMOL/L — SIGNIFICANT CHANGE UP (ref 22–31)
CREAT SERPL-MCNC: 0.91 MG/DL — SIGNIFICANT CHANGE UP (ref 0.5–1.3)
EGFR: 85 ML/MIN/1.73M2 — SIGNIFICANT CHANGE UP
GLUCOSE SERPL-MCNC: 131 MG/DL — HIGH (ref 70–99)
POTASSIUM SERPL-MCNC: 3.3 MMOL/L — LOW (ref 3.5–5.3)
POTASSIUM SERPL-SCNC: 3.3 MMOL/L — LOW (ref 3.5–5.3)
SODIUM SERPL-SCNC: 141 MMOL/L — SIGNIFICANT CHANGE UP (ref 135–145)

## 2024-10-27 RX ORDER — POTASSIUM CHLORIDE 10 MEQ
20 TABLET, EXTENDED RELEASE ORAL DAILY
Refills: 0 | Status: DISCONTINUED | OUTPATIENT
Start: 2024-10-27 | End: 2024-10-29

## 2024-10-27 RX ORDER — LORAZEPAM 2 MG
2 TABLET ORAL ONCE
Refills: 0 | Status: DISCONTINUED | OUTPATIENT
Start: 2024-10-27 | End: 2024-10-29

## 2024-10-27 RX ORDER — POTASSIUM CHLORIDE 10 MEQ
10 TABLET, EXTENDED RELEASE ORAL
Refills: 0 | Status: COMPLETED | OUTPATIENT
Start: 2024-10-27 | End: 2024-10-27

## 2024-10-27 RX ORDER — LORAZEPAM 2 MG
2 TABLET ORAL ONCE
Refills: 0 | Status: DISCONTINUED | OUTPATIENT
Start: 2024-10-27 | End: 2024-10-27

## 2024-10-27 RX ORDER — QUETIAPINE FUMARATE 200 MG/1
25 TABLET ORAL AT BEDTIME
Refills: 0 | Status: DISCONTINUED | OUTPATIENT
Start: 2024-10-27 | End: 2024-10-29

## 2024-10-27 RX ADMIN — SUCRALFATE 1 GRAM(S): 1 SUSPENSION ORAL at 17:07

## 2024-10-27 RX ADMIN — Medication 10 MILLIEQUIVALENT(S): at 08:21

## 2024-10-27 RX ADMIN — Medication 5 MILLIGRAM(S): at 22:24

## 2024-10-27 RX ADMIN — Medication 100 MILLIEQUIVALENT(S): at 12:11

## 2024-10-27 RX ADMIN — QUETIAPINE FUMARATE 25 MILLIGRAM(S): 200 TABLET ORAL at 22:24

## 2024-10-27 RX ADMIN — Medication 1.5 MILLIGRAM(S): at 22:24

## 2024-10-27 RX ADMIN — SUCRALFATE 1 GRAM(S): 1 SUSPENSION ORAL at 23:52

## 2024-10-27 RX ADMIN — PIPERACILLIN AND TAZOBACTAM 25 GRAM(S): .5; 4 INJECTION, POWDER, LYOPHILIZED, FOR SOLUTION INTRAVENOUS at 12:11

## 2024-10-27 RX ADMIN — Medication 2 MILLIGRAM(S): at 03:11

## 2024-10-27 RX ADMIN — PANTOPRAZOLE SODIUM 40 MILLIGRAM(S): 40 TABLET, DELAYED RELEASE ORAL at 05:13

## 2024-10-27 RX ADMIN — Medication 1 TABLET(S): at 12:11

## 2024-10-27 RX ADMIN — Medication 100 MILLIEQUIVALENT(S): at 11:18

## 2024-10-27 RX ADMIN — GABAPENTIN 100 MILLIGRAM(S): 300 CAPSULE ORAL at 12:12

## 2024-10-27 RX ADMIN — PIPERACILLIN AND TAZOBACTAM 25 GRAM(S): .5; 4 INJECTION, POWDER, LYOPHILIZED, FOR SOLUTION INTRAVENOUS at 20:11

## 2024-10-27 RX ADMIN — HALOPERIDOL DECANOATE 1 MILLIGRAM(S): 50 INJECTION INTRAMUSCULAR at 00:02

## 2024-10-27 RX ADMIN — SUCRALFATE 1 GRAM(S): 1 SUSPENSION ORAL at 05:13

## 2024-10-27 RX ADMIN — PANTOPRAZOLE SODIUM 40 MILLIGRAM(S): 40 TABLET, DELAYED RELEASE ORAL at 17:07

## 2024-10-27 RX ADMIN — Medication 100 MILLIEQUIVALENT(S): at 10:11

## 2024-10-27 RX ADMIN — Medication 0.25 MILLIGRAM(S): at 14:14

## 2024-10-27 RX ADMIN — Medication 20 MILLIEQUIVALENT(S): at 14:14

## 2024-10-27 RX ADMIN — Medication 40 MILLIGRAM(S): at 05:10

## 2024-10-27 RX ADMIN — SUCRALFATE 1 GRAM(S): 1 SUSPENSION ORAL at 12:45

## 2024-10-27 RX ADMIN — Medication 0.25 MILLIGRAM(S): at 22:24

## 2024-10-27 RX ADMIN — Medication 0.25 MILLIGRAM(S): at 05:13

## 2024-10-27 RX ADMIN — PIPERACILLIN AND TAZOBACTAM 25 GRAM(S): .5; 4 INJECTION, POWDER, LYOPHILIZED, FOR SOLUTION INTRAVENOUS at 04:55

## 2024-10-27 RX ADMIN — Medication 5 MILLIGRAM(S): at 15:36

## 2024-10-27 RX ADMIN — Medication 5 MILLIGRAM(S): at 05:13

## 2024-10-27 RX ADMIN — Medication 10 MILLIGRAM(S): at 21:50

## 2024-10-27 NOTE — PROGRESS NOTE ADULT - PROBLEM SELECTOR PLAN 2
SLP noted  S/P MBS   Started on hyoscyamine   Continue iv PPI bid  Can add CCB  now on diet  GI eval

## 2024-10-27 NOTE — PROGRESS NOTE ADULT - ASSESSMENT
78-year-old male history of esophageal motility issues not on his medications prescribed by his GI complaining of spitting up possibly aspirating having a low-grade fever 100.4 here.  Noted to have hypoxia at 89% on room air.    sepsis  ely  aspiration  asp pna  frail - weak - elderly  esophageal motility issues   ataxic gait    remains a risk for aspiration  on ABX  vs noted  swallow studies noted  neuro eval noted - CT head no acute path - Ach rec ab ordered    trend renal indices  I and O  replete lytes  trend Lactic Acid  emp ABX  asp prec  HOB elev  oral hygiene  skin care  monitor VS and HD and Sat  goal sat > 88 pct  Incentive Antonio if able to cooperate  CT chest -   GOC discussion  assist with needs

## 2024-10-27 NOTE — PROGRESS NOTE ADULT - ASSESSMENT
The patient is an 78 year old male with a history of HL, atrial fibrillation s/p Watchman, pacemaker, CAD s/ CABG, CVA, esophageal spasm who presents with difficulty swallowing.    10/27/24  Seen at [facility] ICU  Lying flat, sleeping comfortably  Potassium-3.3    Plan:  - ECG with atrial fibrillation and intermittent ventricular paced rhythm  - Pacemaker interrogated 6/24 with normal function and good battery life  - CT chest with multilobar pneumonia  - BNP 3564  - Echo with mildly reduced LV systolic function, mod MR, mod TR  - Not on anticoagulation for atrial fibrillation due to presence of left atrial appendage occlusion device  - Currently NPO  - K-supps and follow labs  - Continue metoprolol tartrate 5 mg IV q8h  - GI follow-up  - If plan includes any GI intervention, may proceed from a cardiac standpoint

## 2024-10-27 NOTE — PROGRESS NOTE ADULT - ASSESSMENT
78 M with PMH of HLD GI motility issues who came in on 10/22 c/o that he keeps regurgitating everything he eats or drinks. Patient follows with a GI doctor. He has a h/o problems swallowing and was placed on a  dicyclomine to help with his dysphagia.   GI/SLP follow.  Found to have aspiration PNA.  Continue Antibiotics. ID eval noted.     No Facial asymmetry  No lateralized weakness.  No Fall LOC or seizure activity.  No Droopy eye lids. 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     Pt says that he has h/o Congential Cervical stenosis and became paraplegic sec to Cervical Myelopathy.    Acetylcholine receptor antibody is pending.     Aspiration precautions.    D/w pt's son and daughter at bedside. Questions answered.  Would follow.

## 2024-10-28 LAB
ALBUMIN SERPL ELPH-MCNC: 2.4 G/DL — LOW (ref 3.3–5)
ALP SERPL-CCNC: 57 U/L — SIGNIFICANT CHANGE UP (ref 30–120)
ALT FLD-CCNC: 20 U/L — SIGNIFICANT CHANGE UP (ref 10–60)
ANION GAP SERPL CALC-SCNC: 4 MMOL/L — LOW (ref 5–17)
AST SERPL-CCNC: 13 U/L — SIGNIFICANT CHANGE UP (ref 10–40)
BILIRUB SERPL-MCNC: 1.2 MG/DL — SIGNIFICANT CHANGE UP (ref 0.2–1.2)
BUN SERPL-MCNC: 30 MG/DL — HIGH (ref 7–23)
CALCIUM SERPL-MCNC: 9 MG/DL — SIGNIFICANT CHANGE UP (ref 8.4–10.5)
CHLORIDE SERPL-SCNC: 106 MMOL/L — SIGNIFICANT CHANGE UP (ref 96–108)
CO2 SERPL-SCNC: 31 MMOL/L — SIGNIFICANT CHANGE UP (ref 22–31)
CREAT SERPL-MCNC: 0.93 MG/DL — SIGNIFICANT CHANGE UP (ref 0.5–1.3)
CULTURE RESULTS: SIGNIFICANT CHANGE UP
CULTURE RESULTS: SIGNIFICANT CHANGE UP
EGFR: 83 ML/MIN/1.73M2 — SIGNIFICANT CHANGE UP
GLUCOSE SERPL-MCNC: 92 MG/DL — SIGNIFICANT CHANGE UP (ref 70–99)
HCT VFR BLD CALC: 36.9 % — LOW (ref 39–50)
HGB BLD-MCNC: 12.3 G/DL — LOW (ref 13–17)
MCHC RBC-ENTMCNC: 33.3 G/DL — SIGNIFICANT CHANGE UP (ref 32–36)
MCHC RBC-ENTMCNC: 33.9 PG — SIGNIFICANT CHANGE UP (ref 27–34)
MCV RBC AUTO: 101.7 FL — HIGH (ref 80–100)
NRBC # BLD: 0 /100 WBCS — SIGNIFICANT CHANGE UP (ref 0–0)
PLATELET # BLD AUTO: 146 K/UL — LOW (ref 150–400)
POTASSIUM SERPL-MCNC: 3.7 MMOL/L — SIGNIFICANT CHANGE UP (ref 3.5–5.3)
POTASSIUM SERPL-SCNC: 3.7 MMOL/L — SIGNIFICANT CHANGE UP (ref 3.5–5.3)
PROT SERPL-MCNC: 5.3 G/DL — LOW (ref 6–8.3)
RBC # BLD: 3.63 M/UL — LOW (ref 4.2–5.8)
RBC # FLD: 13.9 % — SIGNIFICANT CHANGE UP (ref 10.3–14.5)
SODIUM SERPL-SCNC: 141 MMOL/L — SIGNIFICANT CHANGE UP (ref 135–145)
SPECIMEN SOURCE: SIGNIFICANT CHANGE UP
SPECIMEN SOURCE: SIGNIFICANT CHANGE UP
WBC # BLD: 8.99 K/UL — SIGNIFICANT CHANGE UP (ref 3.8–10.5)
WBC # FLD AUTO: 8.99 K/UL — SIGNIFICANT CHANGE UP (ref 3.8–10.5)

## 2024-10-28 PROCEDURE — 99221 1ST HOSP IP/OBS SF/LOW 40: CPT

## 2024-10-28 RX ORDER — ISOSORBIDE DINITRATE 10 MG
5 TABLET ORAL THREE TIMES A DAY
Refills: 0 | Status: DISCONTINUED | OUTPATIENT
Start: 2024-10-28 | End: 2024-10-29

## 2024-10-28 RX ORDER — ASPIRIN/MAG CARB/ALUMINUM AMIN 325 MG
81 TABLET ORAL DAILY
Refills: 0 | Status: DISCONTINUED | OUTPATIENT
Start: 2024-10-28 | End: 2024-10-29

## 2024-10-28 RX ORDER — ACETAMINOPHEN 500 MG
650 TABLET ORAL EVERY 6 HOURS
Refills: 0 | Status: DISCONTINUED | OUTPATIENT
Start: 2024-10-28 | End: 2024-10-29

## 2024-10-28 RX ADMIN — SUCRALFATE 1 GRAM(S): 1 SUSPENSION ORAL at 17:51

## 2024-10-28 RX ADMIN — Medication 1.5 MILLIGRAM(S): at 21:25

## 2024-10-28 RX ADMIN — Medication 5 MILLIGRAM(S): at 11:53

## 2024-10-28 RX ADMIN — Medication 5 MILLIGRAM(S): at 21:25

## 2024-10-28 RX ADMIN — Medication 81 MILLIGRAM(S): at 11:53

## 2024-10-28 RX ADMIN — PIPERACILLIN AND TAZOBACTAM 25 GRAM(S): .5; 4 INJECTION, POWDER, LYOPHILIZED, FOR SOLUTION INTRAVENOUS at 11:53

## 2024-10-28 RX ADMIN — Medication 40 MILLIGRAM(S): at 05:02

## 2024-10-28 RX ADMIN — PIPERACILLIN AND TAZOBACTAM 25 GRAM(S): .5; 4 INJECTION, POWDER, LYOPHILIZED, FOR SOLUTION INTRAVENOUS at 19:46

## 2024-10-28 RX ADMIN — Medication 650 MILLIGRAM(S): at 09:04

## 2024-10-28 RX ADMIN — PANTOPRAZOLE SODIUM 40 MILLIGRAM(S): 40 TABLET, DELAYED RELEASE ORAL at 17:51

## 2024-10-28 RX ADMIN — Medication 0.25 MILLIGRAM(S): at 05:02

## 2024-10-28 RX ADMIN — Medication 10 MILLIGRAM(S): at 21:25

## 2024-10-28 RX ADMIN — PANTOPRAZOLE SODIUM 40 MILLIGRAM(S): 40 TABLET, DELAYED RELEASE ORAL at 05:02

## 2024-10-28 RX ADMIN — Medication 650 MILLIGRAM(S): at 10:04

## 2024-10-28 RX ADMIN — Medication 0.25 MILLIGRAM(S): at 21:25

## 2024-10-28 RX ADMIN — Medication 5 MILLIGRAM(S): at 17:50

## 2024-10-28 RX ADMIN — GABAPENTIN 100 MILLIGRAM(S): 300 CAPSULE ORAL at 11:53

## 2024-10-28 RX ADMIN — QUETIAPINE FUMARATE 25 MILLIGRAM(S): 200 TABLET ORAL at 21:25

## 2024-10-28 RX ADMIN — SUCRALFATE 1 GRAM(S): 1 SUSPENSION ORAL at 23:21

## 2024-10-28 RX ADMIN — Medication 0.25 MILLIGRAM(S): at 15:21

## 2024-10-28 RX ADMIN — SUCRALFATE 1 GRAM(S): 1 SUSPENSION ORAL at 05:02

## 2024-10-28 RX ADMIN — Medication 20 MILLIEQUIVALENT(S): at 11:53

## 2024-10-28 RX ADMIN — Medication 5 MILLIGRAM(S): at 05:02

## 2024-10-28 RX ADMIN — PIPERACILLIN AND TAZOBACTAM 25 GRAM(S): .5; 4 INJECTION, POWDER, LYOPHILIZED, FOR SOLUTION INTRAVENOUS at 03:30

## 2024-10-28 RX ADMIN — Medication 1 TABLET(S): at 11:53

## 2024-10-28 RX ADMIN — SUCRALFATE 1 GRAM(S): 1 SUSPENSION ORAL at 11:53

## 2024-10-28 NOTE — BH CONSULTATION LIAISON ASSESSMENT NOTE - NSBHCHARTREVIEWLAB_PSY_A_CORE FT
12.3   8.99  )-----------( 146      ( 28 Oct 2024 06:00 )             36.9   10-28    141  |  106  |  30[H]  ----------------------------<  92  3.7   |  31  |  0.93    Ca    9.0      28 Oct 2024 06:00    TPro  5.3[L]  /  Alb  2.4[L]  /  TBili  1.2  /  DBili  x   /  AST  13  /  ALT  20  /  AlkPhos  57  10-28

## 2024-10-28 NOTE — PROGRESS NOTE ADULT - ASSESSMENT
78-year-old male history of esophageal motility issues not on his medications prescribed by his GI complaining of spitting up possibly aspirating having a low-grade fever 100.4 here.  Noted to have hypoxia at 89% on room air.    sepsis  ely  aspiration  asp pna  frail - weak - elderly  esophageal motility issues   ataxic gait    remains a risk for aspiration  on ABX  vs noted  swallow studies noted  neuro eval noted - CT head no acute path - Ach rec ab ordered  got ativan overnight     trend renal indices  I and O  replete lytes  trend Lactic Acid  emp ABX  asp prec  HOB elev  oral hygiene  skin care  monitor VS and HD and Sat  goal sat > 88 pct  Incentive Antonio if able to cooperate  CT chest -   GOC discussion  assist with needs

## 2024-10-28 NOTE — BH CONSULTATION LIAISON ASSESSMENT NOTE - NSBHATTESTTYPEVISIT_PSY_A_CORE
We are committed to providing you with the best care possible.   In order to help us achieve these goals please remember to bring all medications, herbal products, and over the counter supplements with you to each visit.     If your provider has ordered testing for you, please be sure to follow up with our office if you have not received results within 7 days after the testing took place.     *If you receive a survey after visiting one of our offices, please take time to share your experience concerning your physician office visit. These surveys are confidential and no health information about you is shared.  We are eager to improve for you and we are counting on your feedback to help make that happen.    
Attending Only

## 2024-10-28 NOTE — BH CONSULTATION LIAISON ASSESSMENT NOTE - HPI (INCLUDE ILLNESS QUALITY, SEVERITY, DURATION, TIMING, CONTEXT, MODIFYING FACTORS, ASSOCIATED SIGNS AND SYMPTOMS)
Patient seen, evaluated and chart reviewed. Patient is an 78 WWM, with no significant prior psychiatric history, with PMH of HLD GI motility issues who came in c/o that he keeps regurgitating everything he eats or drinks. Patient is follows with a GI doctor. He has a h/o problems swallowing and was placed on a  dicyclomine to help with his dysphagia. Patient says he developed a rash. Patient saw a dermatologist and was prescribed prednisone. The dermatologist told him to stop the dicyclomine. He says his GI unaware that pt stopped taking his dicyclomine. He denies SOB. In the ER he was found to have T 100.4 and SaO2 89% on RA. Patient was initially agitated and confused, requiring multiple PRNs with good response to Haldol. At this time patient is calm and cooperative, no evidence of acute psychosis, herlinda or depression.

## 2024-10-28 NOTE — CASE MANAGEMENT PROGRESS NOTE - NSCMPROGRESSNOTE_GEN_ALL_CORE
Per treatment team ROUNDS PT. STILL ACUTE:  ON iv ZOSYN FOR ASP PNA ( cms star ) USING O23L NC, Pt. confused at times last night and lethargic, MBS now on soft bite size diet  DASH/TLC eat slowly , due to Esophogeal motility issues.  PT/OT following recommend [facility].        [facility]HA from [facility] for 8 1/2 hrs M-F and 8 hr Sat/ Sun .  His HHA Remmie  visited in [facility] today.      Pt. states he is visiting his 2 sons in [place] Cristobal [phone] and Stew for the past few months and staying at [facility].     [facility]  FOR  extended stay in [place].    Pt. had [facility] home care PT/OT   States he has all his DME there Slideboard, Motorized scooter, RW, Cane Commode, Shower chair urinal for use at night. Pt. declined offer for CM to call sons - pt. states he manages his own HHA, meds, appointments himself.  Has no steps in Hotel, ground level.    PCP in [facility] Dr. Naeem Covington [phone]  Pharmacy [facility], [place].   Per treatment team ROUNDS PT. STILL ACUTE:  ON iv ZOSYN FOR ASP PNA ( cms star ) USING O23L NC, Pt. confused at times last night and lethargic, MBS now on soft bite size diet  DASH/TLC eat slowly , due to Esophogeal motility issues.  PT/OT following recommend [facility].        HHA from [facility] for 8 1/2 hrs M-F and 8 hr Sat/ Sun .  His HHA Ezekiel  visited in [facility] today.      Pt. states he is visiting his 2 sons in [place] Crist[place] [phone] and Stew for the past few months and staying at [facility].     [facility]  FOR  extended stay in [place].    Pt. had [facility] [phone]  home care PT/OT PTA  telehealth Medical needs with [employer] NP.     States he has all his DME there Slideboard, Motorized scooter, RW, Cane Commode, Shower chair urinal for use at night.     CM met with pt. at bedside discussed transition plans pt.  has been in [facility] recently and would rather go home with his HHA.  CM explained PT recommendations and his weakness and he  would prefer home if needed with 24 hour help.  Pt. asked CM to call son Cristobal  [phone]  -  CM called son and discussed above and likely d/c in next few days and may need home oxygen.  son states he will conference with patient and pt's other son to discuss plans. Also discussed transportation needs and different options.  son states he would want to be there and likely with his other son to settle pt. in to the hotel upon d/c.  He agree it is safest if home to hire 24 hr care.  Provided CM contact information for transition planning needs.         Has no steps in Hotel, ground level.    PCP in [facility] Dr. Naeem Covington [phone]  Pharmacy [facility], [place].    telehealth Medical needs with [employer] NP.    CM following.   Per treatment team ROUNDS PT. STILL ACUTE:  ON iv ZOSYN FOR ASP PNA ( cms star ) USING O23L NC, Pt. confused at times last night and lethargic, MBS now on soft bite size diet  DASH/TLC eat slowly , due to Esophogeal motility issues.  PT/OT following recommend [facility].        [facility] from [facility] for 8 1/2 hrs M-F and 8 hr Sat/ Sun .  His HHA Ezekiel  visited in [facility] today.      Pt. states he is visiting his 2 sons in [place] [phone] and Stew for the past few months and staying at [facility].     [facility]  FOR  extended stay in [place].    [facility] [phone]  home care PT/OT PTA  telehealth Medical needs with [employer] NP.     States he has all his DME there Slideboard, Motorized scooter, RW, Cane Commode, Shower chair urinal for use at night.     CM met with pt. at bedside discussed transition plans pt.  has been in [facility] [place] recently and would rather go home with his HHA.  CM explained PT recommendations and his weakness and he  would prefer home if needed with 24 hour help.  Pt. asked CM to call son Cristobal  [phone]  -  CM called son and discussed above and likely d/c in next few days and may need home oxygen.  son states he will conference with patient and pt's other son to discuss plans. Also discussed transportation needs and different options.  son states he would want to be there and likely with his other son to settle pt. in to the hotel upon d/c.  He agree it is safest if home to hire 24 hr care Vs [facility] to gain strength first..  Provided CM contact information for transition planning needs.         Has no steps in Hotel, ground level.    PCP in [facility] Dr. Naeem Covington [phone]  Pharmacy [facility], [place].    telehealth Medical needs with [employer] [facility] NP.    CM following.   Per treatment team ROUNDS PT. STILL ACUTE:  ON iv ZOSYN FOR ASP PNA ( cms star ) USING O23L NC, Pt. confused at times last night and lethargic, MBS now on soft bite size diet  DASH/TLC eat slowly , due to Esophogeal motility issues.  PT/OT following recommend [facility].        [facility] from [facility] for 8 1/2 hrs M-F and 8 hr Sat/ Sun .  His HHA Ezekiel  visited in [facility] today.      Pt. states he is visiting his 2 sons in [place] [phone] and Stew for the past few months and staying at [facility].     [facility]  FOR  extended stay in [place].    [facility] [phone]  home care PT/OT PTA  telehealth Medical needs with [employer] NP.     States he has all his DME there Slideboard, Motorized scooter, RW, Cane Commode, Shower chair urinal for use at night.     CM met with pt. at bedside discussed transition plans pt.  has been in [facility] [place] recently and would rather go home with his HHA.  CM explained PT recommendations and his weakness and he  would prefer home if needed with 24 hour help.  Pt. asked CM to call son Cristobal  [phone]  -  CM called son and discussed above and likely d/c in next few days and may need home oxygen.  son states he will conference with patient and pt's other son to discuss plans. Also discussed transportation needs and different options.  son states he would want to be there and likely with his other son to settle pt. in to the hotel upon d/c.  He agree it is safest if home to hire 24 hr care Vs [facility] to gain strength first..  Provided CM contact information for transition planning needs.         Has no steps in Hotel, ground level.    PCP in [facility] Dr. Naeem Covington [phone]  Pharmacy [facility], [place].    telehealth Medical needs with [employer] [facility] NP.    CM following.      Addendum :  2pm received return call from Cristobal Clemens pt. son states he spoke with pt's other son Stew and patient and they are in agreement with [facility] for a short time upon d/c.  CM informed TREVA Jacob who will provide a [facility] list as he wants a place by him in [facility].

## 2024-10-28 NOTE — PHARMACOTHERAPY INTERVENTION NOTE - COMMENTS
Rx Written	Rx Dispensed	Drug	Quantity	Days Supply	Prescriber Name	Dispenser  09/18/2024	09/19/2024	clonazepam 0.5 mg tablet	45	15	Avery Dang MD	[facility] #[id number]  08/27/2024	08/27/2024	clonazepam 0.5 mg tablet	90	30	Avery Dang MD	[id number]  08/04/2024	08/04/2024	clonazepam 0.5 mg tablet	60	20	Avery Dang MD	[id number]  07/07/2024	07/07/2024	clonazepam 0.5 mg tablet	90	30	Avery Dang MD	[id number]  06/14/2024	06/16/2024	clonazepam 1 mg tablet	45	30	Naeem Escalante	[facility] #[id number]

## 2024-10-28 NOTE — BH CONSULTATION LIAISON ASSESSMENT NOTE - NSBHCHARTREVIEWVS_PSY_A_CORE FT
Vital Signs Last 24 Hrs  T(C): 36.4 (28 Oct 2024 11:50), Max: 36.8 (27 Oct 2024 20:58)  T(F): 97.5 (28 Oct 2024 11:50), Max: 98.3 (28 Oct 2024 07:41)  HR: 61 (28 Oct 2024 11:50) (60 - 65)  BP: 151/68 (28 Oct 2024 11:50) (146/68 - 156/66)  BP(mean): 96 (28 Oct 2024 07:41) (92 - 96)  RR: 17 (28 Oct 2024 11:50) (17 - 21)  SpO2: 91% (28 Oct 2024 11:50) (91% - 95%)    Parameters below as of 28 Oct 2024 11:50  Patient On (Oxygen Delivery Method): nasal cannula

## 2024-10-28 NOTE — SOCIAL WORK PROGRESS NOTE - NSSWPROGRESSNOTE_GEN_ALL_CORE
TREVA spoke to patients son Cristobal to discuss discharge planning.  He is agreeable to [facility] and TREVA to email list to him at [email or web address]. Son will provide choices.  TREVA to follow.

## 2024-10-28 NOTE — PROGRESS NOTE ADULT - ASSESSMENT
78 M with PMH of HLD GI motility issues who came in on 10/22 c/o that he keeps regurgitating everything he eats or drinks. Patient follows with a GI doctor. He has a h/o problems swallowing and was placed on a  dicyclomine to help with his dysphagia.   GI/SLP follow.  Found to have aspiration PNA.  Continue Antibiotics. ID eval noted.     No Facial asymmetry  No lateralized weakness.  No Fall LOC or seizure activity.  No Droopy eye lids. 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     Pt says that he has h/o Congential Cervical stenosis and became paraplegic sec to Cervical Myelopathy.    Acetylcholine receptor antibody is still pending.     Aspiration precautions.    D/w pt.   Would follow.  78 M with PMH of HLD GI motility issues who came in on 10/22 c/o that he keeps regurgitating everything he eats or drinks. Patient follows with a GI doctor. He has a h/o problems swallowing and was placed on a  dicyclomine to help with his dysphagia.   GI/SLP follow.  Found to have aspiration PNA.  Continue Antibiotics. ID eval noted.     No Facial asymmetry  No lateralized weakness.  No Fall LOC or seizure activity.  No Droopy eye lids. 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     Pt says that he has h/o Congential Cervical stenosis and became paraplegic sec to Cervical Myelopathy.    Acetylcholine receptor antibody is still pending.     Deconditioning.     Aspiration precautions.    D/w pt and his aide at bedside. Questions answered.  Would follow.

## 2024-10-28 NOTE — PROGRESS NOTE ADULT - ASSESSMENT
Esophageal Dysphagia  Esophageal Dysmotility     Recommendations:  - Suspect pt has underlying esophageal dysmotility possible diffuse esophageal spasm. He was reportedly placed on a medication to relax his esophagus ?CCB but caused a rash  - sdiet per slp  - c/w hyoscyamine  - Can add CCB if needed   - PPI 40mg po BID  - Carafate QID  - f/u GI in [place] on discharge, may need esophageal manometry  - Will follow with you      [facility]  [phone]

## 2024-10-28 NOTE — BH CONSULTATION LIAISON ASSESSMENT NOTE - NSBHMSEAFFCONG_PSY_A_CORE
Anesthesia Post Evaluation    Patient: Jase Peña    Procedure(s) Performed: Procedure(s) (LRB):  ORIF, FRACTURE, CLAVICLE (Left)    Final Anesthesia Type: general      Patient location during evaluation: PACU  Patient participation: Yes- Able to Participate  Level of consciousness: awake and sedated  Post-procedure vital signs: reviewed and stable  Pain management: adequate  Airway patency: patent    PONV status at discharge: No PONV  Anesthetic complications: no      Cardiovascular status: blood pressure returned to baseline  Respiratory status: unassisted  Hydration status: euvolemic  Follow-up not needed.              Vitals Value Taken Time   /70 09/09/24 1646   Temp 36.7 °C (98 °F) 09/09/24 1416   Pulse 61 09/09/24 1659   Resp 18 09/09/24 1645   SpO2 94 % 09/09/24 1659   Vitals shown include unfiled device data.      Event Time   Out of Recovery 15:11:32         Pain/Dayana Score: Presence of Pain: denies (9/9/2024 12:15 PM)  Pain Rating Prior to Med Admin: 7 (9/9/2024  2:55 PM)  Dayana Score: 10 (9/9/2024  3:15 PM)           Congruent

## 2024-10-28 NOTE — BH CONSULTATION LIAISON ASSESSMENT NOTE - CURRENT MEDICATION
MEDICATIONS  (STANDING):  aspirin  chewable 81 milliGRAM(s) Oral daily  atorvastatin 10 milliGRAM(s) Oral at bedtime  clonazePAM  Tablet 1.5 milliGRAM(s) Oral at bedtime  enoxaparin Injectable 40 milliGRAM(s) SubCutaneous every 24 hours  gabapentin 100 milliGRAM(s) Oral daily  hyoscyamine SL 0.25 milliGRAM(s) SubLingual three times a day  isosorbide   dinitrate Tablet (ISORDIL) 5 milliGRAM(s) Oral three times a day  lactobacillus acidophilus 1 Tablet(s) Oral daily  LORazepam   Injectable 2 milliGRAM(s) IV Push once  melatonin 5 milliGRAM(s) Oral at bedtime  pantoprazole    Tablet 40 milliGRAM(s) Oral two times a day  piperacillin/tazobactam IVPB.. 3.375 Gram(s) IV Intermittent every 8 hours  potassium chloride   Powder 20 milliEquivalent(s) Oral daily  QUEtiapine 25 milliGRAM(s) Oral at bedtime  sucralfate 1 Gram(s) Oral four times a day    MEDICATIONS  (PRN):  acetaminophen     Tablet .. 650 milliGRAM(s) Oral every 6 hours PRN Mild Pain (1 - 3)  albuterol    90 MICROgram(s) HFA Inhaler 2 Puff(s) Inhalation every 6 hours PRN Shortness of Breath and/or Wheezing  haloperidol    Injectable 1 milliGRAM(s) IntraMuscular every 6 hours PRN Agitation  ondansetron Injectable 4 milliGRAM(s) IV Push every 6 hours PRN Nausea and/or Vomiting

## 2024-10-28 NOTE — PROGRESS NOTE ADULT - ASSESSMENT
The patient is an 78 year old male with a history of HL, atrial fibrillation s/p Watchman, pacemaker, CAD s/ CABG, CVA, esophageal spasm who presents with difficulty swallowing.    Plan:  - ECG with atrial fibrillation and intermittent ventricular paced rhythm  - Pacemaker interrogated 6/24 with normal function and good battery life  - CT chest with multilobar PNA  - BNP 3564  - Echo with mildly reduced LV systolic function, mod MR, mod TR  - Not on anticoagulation for atrial fibrillation due to presence of left atrial appendage occlusion device  - Continue isordil 5 mg tid  - Resume aspirin 81 mg daily  - Continue atorvastatin 10 mg daily  - GI follow-up

## 2024-10-29 ENCOUNTER — TRANSCRIPTION ENCOUNTER (OUTPATIENT)
Age: 79
End: 2024-10-29

## 2024-10-29 VITALS
OXYGEN SATURATION: 94 % | RESPIRATION RATE: 22 BRPM | DIASTOLIC BLOOD PRESSURE: 72 MMHG | HEART RATE: 69 BPM | TEMPERATURE: 98 F | SYSTOLIC BLOOD PRESSURE: 167 MMHG

## 2024-10-29 LAB
ACRM BINDING ANTIBODY: <0.03 NMOL/L — SIGNIFICANT CHANGE UP (ref 0–0.24)
ANION GAP SERPL CALC-SCNC: 7 MMOL/L — SIGNIFICANT CHANGE UP (ref 5–17)
BUN SERPL-MCNC: 25 MG/DL — HIGH (ref 7–23)
CALCIUM SERPL-MCNC: 9.1 MG/DL — SIGNIFICANT CHANGE UP (ref 8.4–10.5)
CHLORIDE SERPL-SCNC: 104 MMOL/L — SIGNIFICANT CHANGE UP (ref 96–108)
CO2 SERPL-SCNC: 29 MMOL/L — SIGNIFICANT CHANGE UP (ref 22–31)
CREAT SERPL-MCNC: 1.01 MG/DL — SIGNIFICANT CHANGE UP (ref 0.5–1.3)
EGFR: 75 ML/MIN/1.73M2 — SIGNIFICANT CHANGE UP
GLUCOSE SERPL-MCNC: 102 MG/DL — HIGH (ref 70–99)
HCT VFR BLD CALC: 37.1 % — LOW (ref 39–50)
HGB BLD-MCNC: 12.7 G/DL — LOW (ref 13–17)
MAGNESIUM SERPL-MCNC: 2 MG/DL — SIGNIFICANT CHANGE UP (ref 1.6–2.6)
MCHC RBC-ENTMCNC: 34 PG — SIGNIFICANT CHANGE UP (ref 27–34)
MCHC RBC-ENTMCNC: 34.2 G/DL — SIGNIFICANT CHANGE UP (ref 32–36)
MCV RBC AUTO: 99.5 FL — SIGNIFICANT CHANGE UP (ref 80–100)
NRBC # BLD: 0 /100 WBCS — SIGNIFICANT CHANGE UP (ref 0–0)
PLATELET # BLD AUTO: 155 K/UL — SIGNIFICANT CHANGE UP (ref 150–400)
POTASSIUM SERPL-MCNC: 3.6 MMOL/L — SIGNIFICANT CHANGE UP (ref 3.5–5.3)
POTASSIUM SERPL-SCNC: 3.6 MMOL/L — SIGNIFICANT CHANGE UP (ref 3.5–5.3)
RBC # BLD: 3.73 M/UL — LOW (ref 4.2–5.8)
RBC # FLD: 13.9 % — SIGNIFICANT CHANGE UP (ref 10.3–14.5)
SODIUM SERPL-SCNC: 140 MMOL/L — SIGNIFICANT CHANGE UP (ref 135–145)
WBC # BLD: 8.32 K/UL — SIGNIFICANT CHANGE UP (ref 3.8–10.5)
WBC # FLD AUTO: 8.32 K/UL — SIGNIFICANT CHANGE UP (ref 3.8–10.5)

## 2024-10-29 RX ORDER — SUCRALFATE 1 G/10ML
1 SUSPENSION ORAL
Qty: 0 | Refills: 0 | DISCHARGE
Start: 2024-10-29

## 2024-10-29 RX ORDER — LACTOBACILLUS ACIDOPHILUS 25MM CELL
1 CAPSULE ORAL
Qty: 0 | Refills: 0 | DISCHARGE
Start: 2024-10-29

## 2024-10-29 RX ORDER — ALBUTEROL 90 MCG
2 AEROSOL (GRAM) INHALATION
Qty: 0 | Refills: 0 | DISCHARGE
Start: 2024-10-29

## 2024-10-29 RX ORDER — CLONAZEPAM 1 MG
3 TABLET ORAL
Qty: 0 | Refills: 0 | DISCHARGE
Start: 2024-10-29

## 2024-10-29 RX ORDER — QUETIAPINE FUMARATE 200 MG/1
1 TABLET ORAL
Qty: 0 | Refills: 0 | DISCHARGE
Start: 2024-10-29

## 2024-10-29 RX ORDER — ISOSORBIDE DINITRATE 10 MG
1 TABLET ORAL
Qty: 0 | Refills: 0 | DISCHARGE
Start: 2024-10-29

## 2024-10-29 RX ORDER — HYOSCYAMINE SULFATE 0.125 MG
1 TABLET,DISINTEGRATING ORAL
Qty: 0 | Refills: 0 | DISCHARGE
Start: 2024-10-29

## 2024-10-29 RX ADMIN — GABAPENTIN 100 MILLIGRAM(S): 300 CAPSULE ORAL at 12:35

## 2024-10-29 RX ADMIN — PIPERACILLIN AND TAZOBACTAM 25 GRAM(S): .5; 4 INJECTION, POWDER, LYOPHILIZED, FOR SOLUTION INTRAVENOUS at 04:01

## 2024-10-29 RX ADMIN — Medication 5 MILLIGRAM(S): at 12:35

## 2024-10-29 RX ADMIN — Medication 5 MILLIGRAM(S): at 15:07

## 2024-10-29 RX ADMIN — Medication 81 MILLIGRAM(S): at 12:35

## 2024-10-29 RX ADMIN — SUCRALFATE 1 GRAM(S): 1 SUSPENSION ORAL at 12:35

## 2024-10-29 RX ADMIN — PIPERACILLIN AND TAZOBACTAM 25 GRAM(S): .5; 4 INJECTION, POWDER, LYOPHILIZED, FOR SOLUTION INTRAVENOUS at 12:35

## 2024-10-29 RX ADMIN — PANTOPRAZOLE SODIUM 40 MILLIGRAM(S): 40 TABLET, DELAYED RELEASE ORAL at 17:01

## 2024-10-29 RX ADMIN — Medication 0.25 MILLIGRAM(S): at 15:07

## 2024-10-29 RX ADMIN — Medication 20 MILLIEQUIVALENT(S): at 12:34

## 2024-10-29 RX ADMIN — Medication 0.25 MILLIGRAM(S): at 05:50

## 2024-10-29 RX ADMIN — Medication 40 MILLIGRAM(S): at 04:02

## 2024-10-29 RX ADMIN — SUCRALFATE 1 GRAM(S): 1 SUSPENSION ORAL at 05:50

## 2024-10-29 RX ADMIN — Medication 1 TABLET(S): at 12:35

## 2024-10-29 RX ADMIN — SUCRALFATE 1 GRAM(S): 1 SUSPENSION ORAL at 17:01

## 2024-10-29 RX ADMIN — PANTOPRAZOLE SODIUM 40 MILLIGRAM(S): 40 TABLET, DELAYED RELEASE ORAL at 05:51

## 2024-10-29 RX ADMIN — Medication 5 MILLIGRAM(S): at 05:51

## 2024-10-29 NOTE — PROGRESS NOTE ADULT - ASSESSMENT
The patient is an 78 year old male with a history of HL, atrial fibrillation s/p Watchman, pacemaker, CAD s/ CABG, CVA, esophageal spasm who presents with difficulty swallowing.    Plan:  - ECG with atrial fibrillation and intermittent ventricular paced rhythm  - Pacemaker interrogated 6/24 with normal function and good battery life  - CT chest with multilobar PNA  - BNP 3564  - Echo with mildly reduced LV systolic function, mod MR, mod TR  - Not on anticoagulation for atrial fibrillation due to presence of left atrial appendage occlusion device  - Continue isordil 5 mg tid  - Continue aspirin 81 mg daily  - Continue atorvastatin 10 mg daily  - GI follow-up

## 2024-10-29 NOTE — PROGRESS NOTE ADULT - PROBLEM SELECTOR PROBLEM 1
Aspiration pneumonia

## 2024-10-29 NOTE — PROGRESS NOTE ADULT - ASSESSMENT
78-year-old male history of esophageal motility issues not on his medications prescribed by his GI complaining of spitting up possibly aspirating having a low-grade fever 100.4 here.  Noted to have hypoxia at 89% on room air.    sepsis  ely  aspiration  asp pna  frail - weak - elderly  esophageal motility issues   ataxic gait    remains a risk for aspiration  on ABX  vs noted  swallow studies noted  neuro eval noted - CT head no acute path - Ach rec ab ordered  dc plan for [facility] _ family aware - sw f/u noted -     trend renal indices  I and O  replete lytes  trend Lactic Acid  emp ABX  asp prec  HOB elev  oral hygiene  skin care  monitor VS and HD and Sat  goal sat > 88 pct  Incentive Trent if able to cooperate  CT chest -   GOC discussion  assist with needs

## 2024-10-29 NOTE — DISCHARGE NOTE NURSING/CASE MANAGEMENT/SOCIAL WORK - FINANCIAL ASSISTANCE
[facility] provides services at a reduced cost to those who are determined to be eligible through [facility]’s financial assistance program. Information regarding [facility]’s financial assistance program can be found by going to [email or web address] or by calling [phone].

## 2024-10-29 NOTE — SOCIAL WORK PROGRESS NOTE - NSSWPROGRESSNOTE_GEN_ALL_CORE
Per Dr Mahmood, pt is medically stable for dc to subacute rehab today. TREVA met with the pt who deferred to his son Cristobal to assist with dc planning. Cristobal requested [facility] but was denied admission. He then requested [facility] vivian and [facility]hav[facility] Cantu. PT was accepted to [facility] and is scheduled for dc at 4:30 pm via ambulance. Pt, his son Cristobal, Tx team and Avery from [facility] notified.

## 2024-10-29 NOTE — DISCHARGE NOTE PROVIDER - CARE PROVIDER_API CALL
[facility]  [address] [place] [address]  Phone: [phone]  Fax: [phone]  Follow Up Time: 1 month    Ceasar Mary  Follow Up Time: 1 month

## 2024-10-29 NOTE — PROGRESS NOTE ADULT - ASSESSMENT
78 M with PMH of HLD GI motility issues who came in on 10/22 c/o that he keeps regurgitating everything he eats or drinks. Patient follows with a GI doctor. He has a h/o problems swallowing and was placed on a  dicyclomine to help with his dysphagia.   GI/SLP follow.  Found to have aspiration PNA.  Continue Antibiotics. ID eval noted.     No Facial asymmetry  No lateralized weakness.  No Fall LOC or seizure activity.  No Droopy eye lids. 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     Pt says that he has h/o Congential Cervical stenosis and became paraplegic sec to Cervical Myelopathy.    Acetylcholine receptor antibody is WNL.    Deconditioning.     Aspiration precautions.    D/w pt.    Would follow.

## 2024-10-29 NOTE — PROGRESS NOTE ADULT - SUBJECTIVE AND OBJECTIVE BOX
KIAN WOODS is a 78yMale , patient examined and chart reviewed.      INTERVAL HPI/ OVERNIGHT EVENTS:   No events. Afebrile.  NAD.    PAST MEDICAL & SURGICAL HISTORY:  Dysphagia      For details regarding the patient's social history, family history, and other miscellaneous elements, please refer the initial infectious diseases consultation and/or the admitting history and physical examination for this admission.    ROS:  CONSTITUTIONAL:  Negative fever or chills  EYES:  Negative  blurry vision or double vision  CARDIOVASCULAR:  Negative for chest pain or palpitations  RESPIRATORY:  Negative for cough, wheezing, or SOB   GASTROINTESTINAL:  Negative for nausea, vomiting, diarrhea, constipation, or abdominal pain  GENITOURINARY:  Negative frequency, urgency or dysuria  NEUROLOGIC:  No headache, confusion, dizziness, lightheadedness  All other systems were reviewed and are negative     No Known Allergies      Current inpatient medications :    ANTIBIOTICS/RELEVANT:  piperacillin/tazobactam IVPB.. 3.375 Gram(s) IV Intermittent every 8 hours    MEDICATIONS  (STANDING):  atorvastatin 10 milliGRAM(s) Oral at bedtime  clonazePAM  Tablet 1.5 milliGRAM(s) Oral at bedtime  enoxaparin Injectable 40 milliGRAM(s) SubCutaneous every 24 hours  gabapentin 100 milliGRAM(s) Oral daily  hyoscyamine SL 0.25 milliGRAM(s) SubLingual three times a day  isosorbide   dinitrate Tablet (ISORDIL) 5 milliGRAM(s) Oral two times a day  lactobacillus acidophilus 1 Tablet(s) Oral daily  LORazepam   Injectable 2 milliGRAM(s) IV Push once  melatonin 5 milliGRAM(s) Oral at bedtime  pantoprazole    Tablet 40 milliGRAM(s) Oral two times a day  potassium chloride   Powder 20 milliEquivalent(s) Oral daily  QUEtiapine 25 milliGRAM(s) Oral at bedtime  sucralfate 1 Gram(s) Oral four times a day    MEDICATIONS  (PRN):  albuterol    90 MICROgram(s) HFA Inhaler 2 Puff(s) Inhalation every 6 hours PRN Shortness of Breath and/or Wheezing  haloperidol    Injectable 1 milliGRAM(s) IntraMuscular every 6 hours PRN Agitation  ondansetron Injectable 4 milliGRAM(s) IV Push every 6 hours PRN Nausea and/or Vomiting      Objective:  Vital Signs Last 24 Hrs  T(C): 36.8 (27 Oct 2024 20:58), Max: 36.8 (27 Oct 2024 20:58)  T(F): 98.2 (27 Oct 2024 20:58), Max: 98.2 (27 Oct 2024 20:58)  HR: 60 (27 Oct 2024 20:58) (60 - 67)  BP: 151/63 (27 Oct 2024 20:58) (123/50 - 170/73)  BP(mean): 92 (27 Oct 2024 20:58) (73 - 94)  RR: 20 (27 Oct 2024 20:58) (17 - 21)  SpO2: 95% (27 Oct 2024 20:58) (88% - 95%)    Parameters below as of 27 Oct 2024 12:30  Patient On (Oxygen Delivery Method): nasal cannula  O2 Flow (L/min): 3    Physical Exam:  General: no acute distress  Neck: supple, trachea midline  Lungs: decreased, no wheeze/rhonchi  Cardiovascular: regular rate and rhythm, S1 S2  Abdomen: soft, nontender,  bowel sounds normal  Neurological: alert and oriented x3  Skin: no rash  Extremities: no edema      LABS:                        11.9   10.50 )-----------( 152      ( 26 Oct 2024 05:30 )             36.2   10-27    141  |  107  |  40[H]  ----------------------------<  131[H]  3.3[L]   |  29  |  0.91    Ca    9.1      27 Oct 2024 05:30  Mg     2.0     10-26    TPro  5.7[L]  /  Alb  2.6[L]  /  TBili  1.8[H]  /  DBili  x   /  AST  18  /  ALT  17  /  AlkPhos  57  10-26      MICROBIOLOGY:  Culture - Blood (collected 22 Oct 2024 22:09)  Source: .Blood BLOOD  Preliminary Report (24 Oct 2024 13:01):    No growth at 24 hours    Culture - Blood (collected 22 Oct 2024 22:09)  Source: .Blood BLOOD  Preliminary Report (24 Oct 2024 13:01):    No growth at 24 hours    RADIOLOGY & ADDITIONAL STUDIES:    ACC: [id number] EXAM:  XR CHEST AP OR PA 1V   ORDERED BY: WALESKA MCMULLEN     PROCEDURE DATE:  10/22/2024          INTERPRETATION:  HISTORY: Sepsis    TECHNIQUE: A single AP view of the chest was obtained.    COMPARISON: None.    FINDINGS: The heart size cannot be adequately assessed on this single   view. Patient status post median sternotomy and CABG. There is a   left-sided dual-lead pacemaker with leads in good position. There is a   central large patchy airspace opacity in the right lung. There are no   pleural effusions. The left lung is grossly clear.    IMPRESSION: Large patchy airspace opacity in the medial aspect of the   right lung, likely pneumonia. Please correlate with the pending CT scan   of the chest.  < from: Xray Chest 1 View AP/PA (10.22.24 @ 22:29) >    ACC: [id number] EXAM:  XR CHEST AP OR PA 1V   ORDERED BY: WALESKA MCMULLEN     PROCEDURE DATE:  10/22/2024          INTERPRETATION:  HISTORY: Sepsis    TECHNIQUE: A single AP view of the chest was obtained.    COMPARISON: None.    FINDINGS: The heart size cannot be adequately assessed on this single   view. Patient status post median sternotomy and CABG. There is a   left-sided dual-lead pacemaker with leads in good position. There is a   central large patchy airspace opacity in the right lung. There are no   pleural effusions. The left lung is grossly clear.    IMPRESSION:lease correlate with the pending CT scan   of the chest.    Assessment :   79YO M with PMH of HLD GI motility issues dysphagia who presented to the hospital with c/o regurgitating everything he eats or drinks. In ER he was found to have T 100.4 WBC wnl and SaO2 89% on RA. CXR with Large patchy airspace opacity in the medial aspect of the right lung, likely pneumonia. Admitted with Asp pna  Afebrile wbc wnl  Had MBS today- diet modified    Plan :   Cont Zosyn till 10/30  Can change to po Augmentin to finish course on dc  Trend temps and cbc  Asp precautions  Stable from ID standpoint    Continue with present regiment.  Appropriate use of antibiotics and adverse effects reviewed.    > 35 minutes were spent in direct patient care reviewing notes, medications ,labs data/ imaging , discussion with multidisciplinary team.    Thank you for allowing me to participate in care of your patient .    Brynn Wells MD  Infectious Disease  [phone]
Chief Complaint: Difficulty swallowing    Interval Events: No events overnight.    Review of Systems:  General: No fevers, chills, weight gain  Skin: No rashes, color changes  Cardiovascular: No chest pain, orthopnea  Respiratory: No shortness of breath, cough  Gastrointestinal: No nausea, abdominal pain  Genitourinary: No incontinence, pain with urination  Musculoskeletal: No pain, swelling, decreased range of motion  Neurological: No headache, weakness  Psychiatric: No depression, anxiety  Endocrine: No weight gain, increased thirst  All other systems are comprehensively negative.    Physical Exam:  Vital Signs Last 24 Hrs  T(C): 36.8 (28 Oct 2024 07:41), Max: 36.8 (27 Oct 2024 20:58)  T(F): 98.3 (28 Oct 2024 07:41), Max: 98.3 (28 Oct 2024 07:41)  HR: 65 (28 Oct 2024 07:41) (60 - 65)  BP: 154/67 (28 Oct 2024 07:41) (134/51 - 156/66)  BP(mean): 96 (28 Oct 2024 07:41) (73 - 96)  RR: 18 (28 Oct 2024 07:41) (18 - 21)  SpO2: 94% (28 Oct 2024 07:41) (88% - 95%)  Parameters below as of 28 Oct 2024 07:41  Patient On (Oxygen Delivery Method): nasal cannula  General: NAD  HEENT: MMM  Neck: No JVD, no carotid bruit  Lungs: CTAB  CV: RRR, nl S1/S2, no M/R/G  Abdomen: S/NT/ND, +BS  Extremities: No LE edema, no cyanosis  Neuro: AAOx3, non-focal  Skin: No rash    Labs:    10-28    141  |  106  |  30[H]  ----------------------------<  92  3.7   |  31  |  0.93    Ca    9.0      28 Oct 2024 06:00    TPro  5.3[L]  /  Alb  2.4[L]  /  TBili  1.2  /  DBili  x   /  AST  13  /  ALT  20  /  AlkPhos  57  10-28                        12.3   8.99  )-----------( 146      ( 28 Oct 2024 06:00 )             36.9           ECG/Telemetry: Sinus rhythm
Chief Complaint: Difficulty swallowing    Interval Events: No events overnight.    Review of Systems:  General: No fevers, chills, weight gain  Skin: No rashes, color changes  Cardiovascular: No chest pain, orthopnea  Respiratory: No shortness of breath, cough  Gastrointestinal: No nausea, abdominal pain  Genitourinary: No incontinence, pain with urination  Musculoskeletal: No pain, swelling, decreased range of motion  Neurological: No headache, weakness  Psychiatric: No depression, anxiety  Endocrine: No weight gain, increased thirst  All other systems are comprehensively negative.    Physical Exam:  Vital Signs Last 24 Hrs  T(C): 36.9 (29 Oct 2024 07:51), Max: 37.3 (28 Oct 2024 20:27)  T(F): 98.4 (29 Oct 2024 07:51), Max: 99.2 (28 Oct 2024 20:27)  HR: 60 (29 Oct 2024 07:51) (60 - 64)  BP: 137/64 (29 Oct 2024 07:51) (137/64 - 167/66)  BP(mean): 89 (29 Oct 2024 07:51) (89 - 89)  RR: 20 (29 Oct 2024 07:51) (17 - 20)  SpO2: 89% (29 Oct 2024 07:51) (89% - 97%)  Parameters below as of 29 Oct 2024 07:51  Patient On (Oxygen Delivery Method): nasal cannula  General: NAD  HEENT: MMM  Neck: No JVD, no carotid bruit  Lungs: CTAB  CV: RRR, nl S1/S2, no M/R/G  Abdomen: S/NT/ND, +BS  Extremities: No LE edema, no cyanosis  Neuro: AAOx3, non-focal  Skin: No rash    Labs:    10-29    140  |  104  |  25[H]  ----------------------------<  102[H]  3.6   |  29  |  1.01    Ca    9.1      29 Oct 2024 06:00  Mg     2.0     10-29    TPro  5.3[L]  /  Alb  2.4[L]  /  TBili  1.2  /  DBili  x   /  AST  13  /  ALT  20  /  AlkPhos  57  10-28                        12.7   8.32  )-----------( 155      ( 29 Oct 2024 06:00 )             37.1       ECG/Telemetry: Sinus rhythm
Chief Complaint: Difficulty swallowing    Interval Events: No events overnight.    Review of Systems:  General: No fevers, chills, weight gain  Skin: No rashes, color changes  Cardiovascular: No chest pain, orthopnea  Respiratory: No shortness of breath, cough  Gastrointestinal: No nausea, abdominal pain  Genitourinary: No incontinence, pain with urination  Musculoskeletal: No pain, swelling, decreased range of motion  Neurological: No headache, weakness  Psychiatric: No depression, anxiety  Endocrine: No weight gain, increased thirst  All other systems are comprehensively negative.    Physical Exam:  Vitals:        Vital Signs Last 24 Hrs  T(C): 36.8 (25 Oct 2024 07:45), Max: 37.1 (24 Oct 2024 15:37)  T(F): 98.3 (25 Oct 2024 07:45), Max: 98.8 (24 Oct 2024 22:34)  HR: 61 (25 Oct 2024 04:57) (61 - 81)  BP: 167/71 (25 Oct 2024 04:57) (161/81 - 190/85)  BP(mean): 96 (25 Oct 2024 04:57) (96 - 110)  RR: 21 (25 Oct 2024 04:57) (20 - 25)  SpO2: 97% (25 Oct 2024 04:57) (96% - 98%)  Parameters below as of 25 Oct 2024 04:57  Patient On (Oxygen Delivery Method): nasal cannula  O2 Flow (L/min): 4  General: NAD  HEENT: MMM  Neck: No JVD, no carotid bruit  Lungs: CTAB  CV: RRR, nl S1/S2, no M/R/G  Abdomen: S/NT/ND, +BS  Extremities: No LE edema, no cyanosis  Neuro: AAOx3, non-focal  Skin: No rash    Labs:                        12.5   7.77  )-----------( 148      ( 25 Oct 2024 05:45 )             38.1     10-25    144  |  108  |  43[H]  ----------------------------<  87  3.8   |  28  |  0.98    Ca    9.6      25 Oct 2024 05:45  Mg     1.9     10-25              ECG/Telemetry: Sinus rhythm
Patient is a 78y Male with a known history of :  Aspiration pneumonia [J69.0]    Dysphagia [R13.10]    Acute respiratory failure with hypoxia [J96.01]      HPI:  This is a 78 M with PMH of HLD GI motility issues who came in c/o that he keeps regurgitating everything he eats or drinks. Patient is follows with a GI doctor. He has a h/o problems swallowing and was placed on a  dicyclomine to help with his dysphagia. Patient says he developed a rash. Patient saw a dermatologist and was prescribed prednisone. The dermatologist told him to stop the dicyclomine. He says his GI unaware that pt stopped taking his dicyclomine. He denies SOB. In the ER he was found to have T 100.4 and SaO2 89% on RA.    ***INCOMPLETE**** (23 Oct 2024 05:30)      REVIEW OF SYSTEMS:    CONSTITUTIONAL: No fever, weight loss, or fatigue  EYES: No eye pain, visual disturbances, or discharge  ENMT:  No difficulty hearing, tinnitus, vertigo; No sinus or throat pain  NECK: No pain or stiffness  BREASTS: No pain, masses, or nipple discharge  RESPIRATORY: No cough, wheezing, chills or hemoptysis; No shortness of breath  CARDIOVASCULAR: No chest pain, palpitations, dizziness, or leg swelling  GASTROINTESTINAL: No abdominal or epigastric pain. No nausea, vomiting, or hematemesis; No diarrhea or constipation. No melena or hematochezia.  GENITOURINARY: No dysuria, frequency, hematuria, or incontinence  NEUROLOGICAL: No headaches, memory loss, loss of strength, numbness, or tremors  SKIN: No itching, burning, rashes, or lesions   LYMPH NODES: No enlarged glands  ENDOCRINE: No heat or cold intolerance; No hair loss  MUSCULOSKELETAL: No joint pain or swelling; No muscle, back, or extremity pain  PSYCHIATRIC: No depression, anxiety, mood swings, or difficulty sleeping  HEME/LYMPH: No easy bruising, or bleeding gums  ALLERGY AND IMMUNOLOGIC: No hives or eczema    MEDICATIONS  (STANDING):  atorvastatin 10 milliGRAM(s) Oral at bedtime  clonazePAM  Tablet 1.5 milliGRAM(s) Oral at bedtime  enoxaparin Injectable 40 milliGRAM(s) SubCutaneous every 24 hours  gabapentin 100 milliGRAM(s) Oral daily  hyoscyamine SL 0.25 milliGRAM(s) SubLingual three times a day  isosorbide   dinitrate Tablet (ISORDIL) 5 milliGRAM(s) Oral two times a day  lactobacillus acidophilus 1 Tablet(s) Oral daily  melatonin 5 milliGRAM(s) Oral at bedtime  pantoprazole    Tablet 40 milliGRAM(s) Oral two times a day  piperacillin/tazobactam IVPB.. 3.375 Gram(s) IV Intermittent every 8 hours  potassium chloride   Solution 10 milliEquivalent(s) Oral three times a day  QUEtiapine 12.5 milliGRAM(s) Oral at bedtime  sucralfate 1 Gram(s) Oral four times a day    MEDICATIONS  (PRN):  albuterol    90 MICROgram(s) HFA Inhaler 2 Puff(s) Inhalation every 6 hours PRN Shortness of Breath and/or Wheezing  haloperidol    Injectable 1 milliGRAM(s) IntraMuscular every 6 hours PRN Agitation  ondansetron Injectable 4 milliGRAM(s) IV Push every 6 hours PRN Nausea and/or Vomiting      ALLERGIES: No Known Allergies      FAMILY HISTORY:      Social history:  Alochol:   Smoking:   Drug Use:   Marital Status:     PHYSICAL EXAMINATION:  -----------------------------  T(C): 36.5 (10-27-24 @ 04:50), Max: 36.5 (10-27-24 @ 04:50)  HR: 67 (10-27-24 @ 04:50) (63 - 67)  BP: 123/50 (10-27-24 @ 04:50) (123/50 - 170/73)  RR: 18 (10-27-24 @ 04:50) (17 - 18)  SpO2: 91% (10-27-24 @ 04:50) (90% - 93%)  Wt(kg): --    10-26 @ 07:01  -  10-27 @ 07:00  --------------------------------------------------------  IN:    IV PiggyBack: 200 mL  Total IN: 200 mL    OUT:  Total OUT: 0 mL    Total NET: 200 mL            Constitutional: well developed, normal appearance, well groomed, well nourished, no deformities and no acute distress.   Eyes: the conjunctiva exhibited no abnormalities and the eyelids demonstrated no xanthelasmas.   HEENT: normal oral mucosa, no oral pallor and no oral cyanosis.   Neck: normal jugular venous A waves present, normal jugular venous V waves present and no jugular venous briggs A waves.   Pulmonary: no respiratory distress, normal respiratory rhythm and effort, no accessory muscle use and lungs were clear to auscultation bilaterally. Anteriorly  Cardiovascular: heart rate and rhythm were irreg/irreg normal S1 and S2 and no murmur, gallop, rub, heave or thrill are present.   Musculoskeletal: the gait could not be assessed.   Extremities: no clubbing of the fingernails, no localized cyanosis, no petechial hemorrhages and no ischemic changes.   Skin: normal skin color and pigmentation, no rash, no venous stasis, no skin lesions, no skin ulcer and no xanthoma was observed.       LABS:   --------  10-27    141  |  107  |  40[H]  ----------------------------<  131[H]  3.3[L]   |  29  |  0.91    Ca    9.1      27 Oct 2024 05:30  Mg     2.0     10-26    TPro  5.7[L]  /  Alb  2.6[L]  /  TBili  1.8[H]  /  DBili  x   /  AST  18  /  ALT  17  /  AlkPhos  57  10-26                         11.9   10.50 )-----------( 152      ( 26 Oct 2024 05:30 )             36.2                   Radiology:    
     KIAN WOODS is a 78yMale , patient examined and chart reviewed.      INTERVAL HPI/ OVERNIGHT EVENTS:   No events. Afebrile.  NAD. Awake.    PAST MEDICAL & SURGICAL HISTORY:  Dysphagia      For details regarding the patient's social history, family history, and other miscellaneous elements, please refer the initial infectious diseases consultation and/or the admitting history and physical examination for this admission.    ROS:  CONSTITUTIONAL:  Negative fever or chills  EYES:  Negative  blurry vision or double vision  CARDIOVASCULAR:  Negative for chest pain or palpitations  RESPIRATORY:  Negative for cough, wheezing, or SOB   GASTROINTESTINAL:  Negative for nausea, vomiting, diarrhea, constipation, or abdominal pain  GENITOURINARY:  Negative frequency, urgency or dysuria  NEUROLOGIC:  No headache, confusion, dizziness, lightheadedness  All other systems were reviewed and are negative     No Known Allergies      Current inpatient medications :    ANTIBIOTICS/RELEVANT:  piperacillin/tazobactam IVPB.. 3.375 Gram(s) IV Intermittent every 8 hours    MEDICATIONS  (STANDING):  aspirin  chewable 81 milliGRAM(s) Oral daily  atorvastatin 10 milliGRAM(s) Oral at bedtime  clonazePAM  Tablet 1.5 milliGRAM(s) Oral at bedtime  enoxaparin Injectable 40 milliGRAM(s) SubCutaneous every 24 hours  gabapentin 100 milliGRAM(s) Oral daily  hyoscyamine SL 0.25 milliGRAM(s) SubLingual three times a day  isosorbide   dinitrate Tablet (ISORDIL) 5 milliGRAM(s) Oral three times a day  lactobacillus acidophilus 1 Tablet(s) Oral daily  LORazepam   Injectable 2 milliGRAM(s) IV Push once  melatonin 5 milliGRAM(s) Oral at bedtime  pantoprazole    Tablet 40 milliGRAM(s) Oral two times a day  potassium chloride   Powder 20 milliEquivalent(s) Oral daily  QUEtiapine 25 milliGRAM(s) Oral at bedtime  sucralfate 1 Gram(s) Oral four times a day    MEDICATIONS  (PRN):  acetaminophen     Tablet .. 650 milliGRAM(s) Oral every 6 hours PRN Mild Pain (1 - 3)  albuterol    90 MICROgram(s) HFA Inhaler 2 Puff(s) Inhalation every 6 hours PRN Shortness of Breath and/or Wheezing  haloperidol    Injectable 1 milliGRAM(s) IntraMuscular every 6 hours PRN Agitation  ondansetron Injectable 4 milliGRAM(s) IV Push every 6 hours PRN Nausea and/or Vomiting      Objective:  Vital Signs Last 24 Hrs  T(C): 36.4 (28 Oct 2024 11:50), Max: 36.8 (27 Oct 2024 20:58)  T(F): 97.5 (28 Oct 2024 11:50), Max: 98.3 (28 Oct 2024 07:41)  HR: 61 (28 Oct 2024 11:50) (60 - 65)  BP: 151/68 (28 Oct 2024 11:50) (151/63 - 156/66)  BP(mean): 96 (28 Oct 2024 07:41) (92 - 96)  RR: 17 (28 Oct 2024 11:50) (17 - 20)  SpO2: 91% (28 Oct 2024 11:50) (91% - 95%)    Parameters below as of 28 Oct 2024 11:50  Patient On (Oxygen Delivery Method): nasal cannula      Physical Exam:  General: no acute distress  Neck: supple, trachea midline  Lungs: decreased, no wheeze/rhonchi  Cardiovascular: regular rate and rhythm, S1 S2  Abdomen: soft, nontender,  bowel sounds normal  Neurological: alert and oriented x3  Skin: no rash  Extremities: no edema      LABS:                        12.3   8.99  )-----------( 146      ( 28 Oct 2024 06:00 )             36.9   10-28    141  |  106  |  30[H]  ----------------------------<  92  3.7   |  31  |  0.93    Ca    9.0      28 Oct 2024 06:00    TPro  5.3[L]  /  Alb  2.4[L]  /  TBili  1.2  /  DBili  x   /  AST  13  /  ALT  20  /  AlkPhos  57  10-28      MICROBIOLOGY:  Culture - Blood (collected 22 Oct 2024 22:09)  Source: .Blood BLOOD  Preliminary Report (24 Oct 2024 13:01):    No growth at 24 hours    Culture - Blood (collected 22 Oct 2024 22:09)  Source: .Blood BLOOD  Preliminary Report (24 Oct 2024 13:01):    No growth at 24 hours    RADIOLOGY & ADDITIONAL STUDIES:    ACC: [id number] EXAM:  XR CHEST AP OR PA 1V   ORDERED BY: WALESKA MCMULLEN     PROCEDURE DATE:  10/22/2024          INTERPRETATION:  HISTORY: Sepsis    TECHNIQUE: A single AP view of the chest was obtained.    COMPARISON: None.    FINDINGS: The heart size cannot be adequately assessed on this single   view. Patient status post median sternotomy and CABG. There is a   left-sided dual-lead pacemaker with leads in good position. There is a   central large patchy airspace opacity in the right lung. There are no   pleural effusions. The left lung is grossly clear.    IMPRESSION: Large patchy airspace opacity in the medial aspect of the   right lung, likely pneumonia. Please correlate with the pending CT scan   of the chest.  < from: Xray Chest 1 View AP/PA (10.22.24 @ 22:29) >    ACC: [id number] EXAM:  XR CHEST AP OR PA 1V   ORDERED BY: WALESKA MCMULLEN     PROCEDURE DATE:  10/22/2024          INTERPRETATION:  HISTORY: Sepsis    TECHNIQUE: A single AP view of the chest was obtained.    COMPARISON: None.    FINDINGS: The heart size cannot be adequately assessed on this single   view. Patient status post median sternotomy and CABG. There is a   left-sided dual-lead pacemaker with leads in good position. There is a   central large patchy airspace opacity in the right lung. There are no   pleural effusions. The left lung is grossly clear.    IMPRESSION:lease correlate with the pending CT scan   of the chest.    Assessment :   77YO M with PMH of HLD GI motility issues dysphagia who presented to the hospital with c/o regurgitating everything he eats or drinks. In ER he was found to have T 100.4 WBC wnl and SaO2 89% on RA. CXR with Large patchy airspace opacity in the medial aspect of the right lung, likely pneumonia. Admitted with Asp pna  Afebrile wbc wnl  Sp MBS - diet modified    Plan :   Cont Zosyn till 10/30  Can change to po Augmentin to finish course on dc  Trend temps and cbc  Asp precautions  Stable from ID standpoint    Continue with present regiment.  Appropriate use of antibiotics and adverse effects reviewed.    > 35 minutes were spent in direct patient care reviewing notes, medications ,labs data/ imaging , discussion with multidisciplinary team.    Thank you for allowing me to participate in care of your patient .    Brynn Wells MD  Infectious Disease  [phone]
Date/Time Patient Seen:  		  Referring MD:   Data Reviewed	       Patient is a 78y old  Male who presents with a chief complaint of Asp PNA (25 Oct 2024 15:20)      Subjective/HPI     PAST MEDICAL & SURGICAL HISTORY:  No pertinent past medical history    Dysphagia          Medication list         MEDICATIONS  (STANDING):  atorvastatin 10 milliGRAM(s) Oral at bedtime  clonazePAM  Tablet 1.5 milliGRAM(s) Oral at bedtime  enoxaparin Injectable 40 milliGRAM(s) SubCutaneous every 24 hours  hyoscyamine SL 0.25 milliGRAM(s) SubLingual three times a day  isosorbide   dinitrate Tablet (ISORDIL) 5 milliGRAM(s) Oral two times a day  methylPREDNISolone sodium succinate Injectable 20 milliGRAM(s) IV Push every 24 hours  pantoprazole    Tablet 40 milliGRAM(s) Oral two times a day  piperacillin/tazobactam IVPB.. 3.375 Gram(s) IV Intermittent every 8 hours  sodium chloride 0.9%. 1000 milliLiter(s) (50 mL/Hr) IV Continuous <Continuous>  sucralfate 1 Gram(s) Oral four times a day    MEDICATIONS  (PRN):  ondansetron Injectable 4 milliGRAM(s) IV Push every 6 hours PRN Nausea and/or Vomiting         Vitals log        ICU Vital Signs Last 24 Hrs  T(C): 36.7 (26 Oct 2024 04:42), Max: 36.9 (25 Oct 2024 20:43)  T(F): 98 (26 Oct 2024 04:42), Max: 98.4 (25 Oct 2024 20:43)  HR: 62 (26 Oct 2024 04:16) (62 - 70)  BP: 163/84 (26 Oct 2024 04:16) (156/72 - 164/78)  BP(mean): 98 (26 Oct 2024 04:16) (97 - 102)  ABP: --  ABP(mean): --  RR: 18 (26 Oct 2024 04:16) (18 - 23)  SpO2: 92% (26 Oct 2024 04:16) (92% - 94%)    O2 Parameters below as of 26 Oct 2024 04:16  Patient On (Oxygen Delivery Method): nasal cannula  O2 Flow (L/min): 3               Input and Output:  I&O's Detail    24 Oct 2024 07:01  -  25 Oct 2024 07:00  --------------------------------------------------------  IN:    sodium chloride 0.9%: 1200 mL  Total IN: 1200 mL    OUT:    Voided (mL): 550 mL  Total OUT: 550 mL    Total NET: 650 mL      25 Oct 2024 07:01  -  26 Oct 2024 05:23  --------------------------------------------------------  IN:    sodium chloride 0.9%: 200 mL  Total IN: 200 mL    OUT:    Voided (mL): 650 mL  Total OUT: 650 mL    Total NET: -450 mL          Lab Data                        12.5   7.77  )-----------( 148      ( 25 Oct 2024 05:45 )             38.1     10-25    144  |  108  |  43[H]  ----------------------------<  87  3.8   |  28  |  0.98    Ca    9.6      25 Oct 2024 05:45  Mg     1.9     10-25              Review of Systems	      Objective     Physical Examination    heart s1s2  lung dc BS  head nc  head at      Pertinent Lab findings & Imaging      Howard:  NO   Adequate UO     I&O's Detail    24 Oct 2024 07:01  -  25 Oct 2024 07:00  --------------------------------------------------------  IN:    sodium chloride 0.9%: 1200 mL  Total IN: 1200 mL    OUT:    Voided (mL): 550 mL  Total OUT: 550 mL    Total NET: 650 mL      25 Oct 2024 07:01  -  26 Oct 2024 05:24  --------------------------------------------------------  IN:    sodium chloride 0.9%: 200 mL  Total IN: 200 mL    OUT:    Voided (mL): 650 mL  Total OUT: 650 mL    Total NET: -450 mL               Discussed with:     Cultures:	        Radiology                            
Patient is a 78y Male with a known history of :  Aspiration pneumonia [J69.0]    Dysphagia [R13.10]    Acute respiratory failure with hypoxia [J96.01]      HPI:  This is a 78 M with PMH of HLD GI motility issues who came in c/o that he keeps regurgitating everything he eats or drinks. Patient is follows with a GI doctor. He has a h/o problems swallowing and was placed on a  dicyclomine to help with his dysphagia. Patient says he developed a rash. Patient saw a dermatologist and was prescribed prednisone. The dermatologist told him to stop the dicyclomine. He says his GI unaware that pt stopped taking his dicyclomine. He denies SOB. In the ER he was found to have T 100.4 and SaO2 89% on RA.    ***INCOMPLETE**** (23 Oct 2024 05:30)      REVIEW OF SYSTEMS:    CONSTITUTIONAL: No fever, weight loss, or fatigue  EYES: No eye pain, visual disturbances, or discharge  ENMT:  No difficulty hearing, tinnitus, vertigo; No sinus or throat pain  NECK: No pain or stiffness  BREASTS: No pain, masses, or nipple discharge  RESPIRATORY: No cough, wheezing, chills or hemoptysis; No shortness of breath  CARDIOVASCULAR: No chest pain, palpitations, dizziness, or leg swelling  GASTROINTESTINAL: No abdominal or epigastric pain. No nausea, vomiting, or hematemesis; No diarrhea or constipation. No melena or hematochezia.  GENITOURINARY: No dysuria, frequency, hematuria, or incontinence  NEUROLOGICAL: No headaches, memory loss, loss of strength, numbness, or tremors  SKIN: No itching, burning, rashes, or lesions   LYMPH NODES: No enlarged glands  ENDOCRINE: No heat or cold intolerance; No hair loss  MUSCULOSKELETAL: No joint pain or swelling; No muscle, back, or extremity pain  PSYCHIATRIC: No depression, anxiety, mood swings, or difficulty sleeping  HEME/LYMPH: No easy bruising, or bleeding gums  ALLERGY AND IMMUNOLOGIC: No hives or eczema    MEDICATIONS  (STANDING):  atorvastatin 10 milliGRAM(s) Oral at bedtime  clonazePAM  Tablet 1.5 milliGRAM(s) Oral at bedtime  enoxaparin Injectable 40 milliGRAM(s) SubCutaneous every 24 hours  hyoscyamine SL 0.25 milliGRAM(s) SubLingual three times a day  isosorbide   dinitrate Tablet (ISORDIL) 5 milliGRAM(s) Oral two times a day  methylPREDNISolone sodium succinate Injectable 20 milliGRAM(s) IV Push every 24 hours  pantoprazole    Tablet 40 milliGRAM(s) Oral two times a day  piperacillin/tazobactam IVPB.. 3.375 Gram(s) IV Intermittent every 8 hours  sodium chloride 0.9%. 1000 milliLiter(s) (50 mL/Hr) IV Continuous <Continuous>  sucralfate 1 Gram(s) Oral four times a day    MEDICATIONS  (PRN):  ondansetron Injectable 4 milliGRAM(s) IV Push every 6 hours PRN Nausea and/or Vomiting      ALLERGIES: No Known Allergies      FAMILY HISTORY:      Social history:  Alochol:   Smoking:   Drug Use:   Marital Status:     PHYSICAL EXAMINATION:  -----------------------------  T(C): 36.7 (10-26-24 @ 04:42), Max: 36.9 (10-25-24 @ 20:43)  HR: 62 (10-26-24 @ 04:16) (62 - 70)  BP: 163/84 (10-26-24 @ 04:16) (156/72 - 164/78)  RR: 18 (10-26-24 @ 04:16) (18 - 23)  SpO2: 92% (10-26-24 @ 04:16) (92% - 94%)  Wt(kg): --    10-25 @ 07:01  -  10-26 @ 07:00  --------------------------------------------------------  IN:    sodium chloride 0.9%: 200 mL  Total IN: 200 mL    OUT:    Voided (mL): 650 mL  Total OUT: 650 mL    Total NET: -450 mL            Constitutional: well developed, normal appearance, well groomed, well nourished, no deformities and no acute distress.   Eyes: the conjunctiva exhibited no abnormalities and the eyelids demonstrated no xanthelasmas.   HEENT: normal oral mucosa, no oral pallor and no oral cyanosis.   Neck: normal jugular venous A waves present, normal jugular venous V waves present and no jugular venous brgigs A waves.   Pulmonary: no respiratory distress, normal respiratory rhythm and effort, no accessory muscle use and lungs were clear to auscultation bilaterally. Anteriorly  Cardiovascular: heart rate and rhythm were irreg/irreg, normal S1 and S2 and no murmur, gallop, rub, heave or thrill are present.   Musculoskeletal: the gait could not be assessed.   Extremities: no clubbing of the fingernails, no localized cyanosis, no petechial hemorrhages and no ischemic changes.   Skin: normal skin color and pigmentation, no rash, no venous stasis, no skin lesions, no skin ulcer and no xanthoma was observed.   .     LABS:   --------  10-26    139  |  104  |  37[H]  ----------------------------<  110[H]  3.3[L]   |  28  |  0.91    Ca    9.2      26 Oct 2024 05:30  Mg     2.0     10-26    TPro  5.7[L]  /  Alb  2.6[L]  /  TBili  1.8[H]  /  DBili  x   /  AST  18  /  ALT  17  /  AlkPhos  57  10-26                         11.9   10.50 )-----------( 152      ( 26 Oct 2024 05:30 )             36.2                   Radiology:    < from: TTE W or WO Ultrasound Enhancing Agent (10.24.24 @ 09:28) >    TRANSTHORACIC ECHOCARDIOGRAM REPORT  ________________________________________________________________________________                                      _______       Pt. Name:       KIAN WOODS Study Date:    10/24/2024  MRN:            [id number]         YOB: 1945  Accession #:    [id number]           Age:           78 years  Account#:       [id number]        Gender:        M  Heart Rate:                         Height:        72.00 in (182.88 cm)  Rhythm:                   Weight:        172.88 lb (78.42 kg)  Blood Pressure: 159/68 mmHg         BSA/BMI:       2.00 m² / 23.45 kg/m²  ________________________________________________________________________________________  Referring Physician:    [id number] Hu Mahmood  Interpreting Physician: Maximiliano Lagos  Primary Sonographer:    Dary Mcfarland    CPT:               MYOCARDIAL STRAIN IMAGING - 17083.m;ECHO TTE WO CON COMP W                     DOPP - 76970.m  Indication(s):     Dyspnea, unspecified -R06.00  Procedure:         Transthoracic echocardiogram with 2-D, M-mode and complete                     spectral and color flow Doppler. Color Doppler performed.                     Spectral Doppler performed. Strain imaging performed for            evaluation of regional and global myocardial shape and                     dimensions.  Ordering Location: [facility]  Admission Status:  Inpatient    _______________________________________________________________________________________     CONCLUSIONS:      1. Left ventricular wall thickness is mildly increased. Left ventricular systolic function is mildly to moderately decreased with an ejection fraction visually estimated at 45 to 50 %.   2. Mildly reduced right ventricular systolic function.   3. Left atrium is mildly dilated.   4. The right atrium is mildly dilated.   5. Moderate mitral regurgitation.   6. Moderate tricuspid regurgitation.   7. Aortic root at the sinuses of Valsalva is borderline, measuring 3.90 cm (indexed 1.95 cm/m²). Ascending aorta is dilated, measuring 4.00 cm (indexed 2.00 cm/m²).   8. Estimated pulmonary artery systolic pressure is 47 mmHg, consistent with mild pulmonary hypertension.    ________________________________________________________________________________________  FINDINGS:     Left Ventricle:  The left ventricular cavity is normal in size. Left ventricular wall thickness is mildly increased. Left ventricular systolic function is mildly to moderately decreased with an ejection fraction visually estimated at 45 to 50%. Left ventricular regional wall motion assessment reveals hypokinesis of the mid anteroseptal, mid inferoseptal and apical walls.     Right Ventricle:  The right ventricular cavity is normal in size and right ventricular systolic function is mildly reduced. Tricuspid annular plane systolic excursion (TAPSE) is 1.3 cm (normal >=1.7 cm). A device lead is visualized.     Left Atrium:  The left atrium is mildly dilated.     Right Atrium:  The right atrium is mildly dilated.     Aortic Valve:  There is fibrocalcific aortic valve sclerosis without stenosis. There is mild to moderate aortic regurgitation.     Mitral Valve:  There is mitral valve thickening of the anterior and posterior leaflets. There is moderate mitral regurgitation.     Tricuspid Valve:  Structurally normal tricuspid valve with normal leaflet excursion. There is moderate tricuspid regurgitation. Estimated pulmonary artery systolic pressure is 47 mmHg, consistent with mild pulmonary hypertension.     Pulmonic Valve:  The pulmonic valve was not well visualized. There is mild to moderate pulmonic regurgitation.     Aorta:  The aortic root at the sinuses of Valsalva is borderline, measuring 3.90 cm (indexed 1.95 cm/m²). The ascending aorta is dilated, measuring 4.00 cm (indexed 2.00 cm/m²).     Systemic Veins:  The inferior vena cava is dilated measuring 3.10 cm in diameter, (dilated >2.1cm) with normal inspiratory collapse (normal >50%) consistent with mildly elevated right atrial pressure (~8, range 5-10mmHg).  ____________________________________________________________________  QUANTITATIVE DATA:  Left Ventricle Measurements: (Indexed to BSA)     IVSd (2D):   1.2 cm  LVPWd (2D):  1.2 cm  LVIDd (2D):  4.3 cm  LVIDs (2D):  3.4 cm  LV Mass:     185 g  92.2 g/m²  Visualized LV EF%: 45 to 50%     MV E Vmax:    0.92 m/s  MV A Vmax:    0.44 m/s  MV E/A:       2.06  e' lateral:   4.85 cm/s  e' medial:    14.70 cm/s  E/e' lateral: 18.91  E/e' medial:  6.24  E/e' Average: 9.38  MV DT:        261 msec    Aorta Measurements: (Normal range) (Indexed to BSA)     Ao Root d    3.90 cm (3.1 - 3.7 cm) 1.95 cm/m²  Ao Asc prox: 4.00 cm                2.00 cm/m²       Left Atrium Measurements: (Indexed to BSA)  LA Diam 2D: 4.80 cm       Right Ventricle Measurements:     TAPSE:            1.3 cm  RV S' Vmax:       9.57 cm/s  RV Base (RVID1):  3.8 cm  RV Mid (RVID2):   2.3 cm  RV Major (RVID3): 9.4 cm       LVOT / RVOT/ Qp/Qs Data: (Indexed to BSA)  LVOT Vmax:      0.78 m/s  LVOT Vmn:       0.578 m/s  LVOT VTI:       16.10 cm  LVOT peak grad: 2 mmHg  LVOT mean grad: 1.0 mmHg    Aortic Valve Measurements:  AV Mean Gradient:       3.0 mmHg  AV VTI:                 27.0 cm  AV VTI Ratio:           0.60  AoV Dimensionless Index 0.60  AR Vmax          3.51 m/s  AR PHT                  1010 msec  AR [place]                1.36 m/s²    Mitral Valve Measurements:     MV E Vmax: 0.9 m/s         MR Vmax:          3.90 m/s  MV A Vmax: 0.4 m/s         MR Peak Gradient: 60.8 mmHg  MV E/A:    2.1       Tricuspid Valve Measurements:     TR Vmax:          3.1 m/s  TR Peak Gradient: 39.2 mmHg  RA Pressure:      8 mmHg  PASP:             47 mmHg       Pulmonic Valve Measurments:  PV Vmax:          1.0 m/s  PV Peak Gradient: 3.8 mmHg    ________________________________________________________________________________________  Electronically signed on 10/24/2024 at 10:49:39 AM by Maximiliano Lagos         *** Final ***    < end of copied text >  
Patient is a 78y old  Male who presents with a chief complaint of Asp PNA (26 Oct 2024 15:08)     HPI: This is a 78 M with PMH of HLD GI motility issues who came in on 10/22 c/o that he keeps regurgitating everything he eats or drinks. Patient follows with a GI doctor. He has a h/o problems swallowing and was placed on a  dicyclomine to help with his dysphagia. Patient says he developed a rash. Patient saw a dermatologist and was prescribed prednisone. The dermatologist told him to stop the dicyclomine. He says his GI isunaware that pt stopped taking his dicyclomine. He denies SOB. In the ER he was found to have T 100.4 and SaO2 89% on RA.    No Facial asymmetry  No lateralized weakness.  No Fall LOC or seizure activity.  No Droopy eye lids. 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     Pt says that he has h/o Congential Cervical stenosis and became paraplegic sec to Cervical Myelopathy    Pt is retired .     Interval history -    In [facility]  Asleep.  Son/daughter are at bedside.    MEDICATIONS  (STANDING):    atorvastatin 10 milliGRAM(s) Oral at bedtime  clonazePAM  Tablet 1.5 milliGRAM(s) Oral at bedtime  enoxaparin Injectable 40 milliGRAM(s) SubCutaneous every 24 hours  gabapentin 100 milliGRAM(s) Oral daily  hyoscyamine SL 0.25 milliGRAM(s) SubLingual three times a day  isosorbide   dinitrate Tablet (ISORDIL) 5 milliGRAM(s) Oral two times a day  lactobacillus acidophilus 1 Tablet(s) Oral daily  melatonin 5 milliGRAM(s) Oral at bedtime  pantoprazole    Tablet 40 milliGRAM(s) Oral two times a day  piperacillin/tazobactam IVPB.. 3.375 Gram(s) IV Intermittent every 8 hours  potassium chloride    Tablet ER 10 milliEquivalent(s) Oral three times a day  QUEtiapine 12.5 milliGRAM(s) Oral at bedtime  sucralfate 1 Gram(s) Oral four times a day    MEDICATIONS  (PRN):    albuterol    90 MICROgram(s) HFA Inhaler 2 Puff(s) Inhalation every 6 hours PRN Shortness of Breath and/or Wheezing  haloperidol    Injectable 1 milliGRAM(s) IntraMuscular every 6 hours PRN Agitation  ondansetron Injectable 4 milliGRAM(s) IV Push every 6 hours PRN Nausea and/or Vomiting    Allergies    No Known Allergies    REVIEW OF SYSTEMS:    CONSTITUTIONAL: No fever  EYES: No eye pain,   ENMT:  No sinus or throat pain  NECK: No pain or stiffness  RESPIRATORY: No cough, No hemoptysis; No shortness of breath  CARDIOVASCULAR: No acute chest pain, palpitations,  or leg swelling  GASTROINTESTINAL: No abdominal pain. 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 No medication allergy. No seasonal allergies.    PHYSICAL EXAM:    Vital Signs Last 24 Hrs    T(C): 36.5 (27 Oct 2024 15:49), Max: 36.5 (27 Oct 2024 04:50)  T(F): 97.7 (27 Oct 2024 15:49), Max: 97.7 (27 Oct 2024 04:50)  HR: 602 (27 Oct 2024 15:49) (60 - 602)  BP: 146/68 (27 Oct 2024 15:49) (123/50 - 170/73)  BP(mean): 94 (27 Oct 2024 15:49) (73 - 100)  RR: 21 (27 Oct 2024 15:49) (17 - 21)  SpO2: 91% (27 Oct 2024 15:49) (88% - 92%)    Parameters below as of 27 Oct 2024 12:30  Patient On (Oxygen Delivery Method): nasal cannula  O2 Flow (L/min): 3      GENERAL: NAD, well-groomed, well-developed  HEAD:  Atraumatic, Normocephalic  EYES: EOMI, PERRLA, conjunctiva and sclera clear  NECK: Supple, No JVD    On Neurological Examination:    Mental Status - Pt is alert, awake, oriented X3. Higher functions are intact.  Follows commands well and able to answer questions appropriately.    Speech -  Normal. Pt has no aphasia.    Cranial Nerves - Pupils 3 mm equal and reactive to light, extraocular eye movements intact. Pt has no visual field deficit.     Motor Exam - 3+/5 in B/L UE. LE  - Able to wiggles toes.      Deep tendon Reflexes - 2 plus all over.    Neck Supple -  Yes.    LABS:             CBC Full  -  ( 26 Oct 2024 05:30 )  WBC Count : 10.50 K/uL  RBC Count : 3.57 M/uL  Hemoglobin : 11.9 g/dL  Hematocrit : 36.2 %  Platelet Count - Automated : 152 K/uL  Mean Cell Volume : 101.4 fL  Mean Cell Hemoglobin : 33.3 pg  Mean Cell Hemoglobin Concentration : 32.9 g/dL    10-26    139  |  104  |  37[H]  ----------------------------<  110[H]  3.3[L]   |  28  |  0.91    Ca    9.2      26 Oct 2024 05:30  Mg     2.0     10-26    TPro  5.7[L]  /  Alb  2.6[L]  /  TBili  1.8[H]  /  DBili  x   /  AST  18  /  ALT  17  /  AlkPhos  57  10-26    Urinalysis Basic - ( 26 Oct 2024 05:30 )    Color: x / Appearance: x / SG: x / pH: x  Gluc: 110 mg/dL / Ketone: x  / Bili: x / Urobili: x   Blood: x / Protein: x / Nitrite: x   Leuk Esterase: x / RBC: x / WBC x   Sq Epi: x / Non Sq Epi: x / Bacteria: x    RADIOLOGY & ADDITIONAL STUDIES:    < from: CT Chest No Cont (10.23.24 @ 10:11) >    Multilobar pneumonia with a distribution suggestive of aspiration.    Diffusely dilated fluid-filled esophagus.    Partially included fat stranding in the abdomen, most concentrated in the retroperitoneum. Consider pancreatitis; correlate with pancreatic enzymes.    < end of copied text >    < from: Xray Esophagram Single Contrast (10.23.24 @ 15:07) >    IMPRESSION:    Limited study demonstrates no peristalsis through the esophagus.  Suspected retained food within the esophagus, without evidence of obstruction as described above.    < end of copied text >    
[facility]  Napoleon Gill PA-C  [address] [place] [address]  [phone]      INTERVAL HPI/OVERNIGHT EVENTS:  no acute events    MEDICATIONS  (STANDING):  enoxaparin Injectable 40 milliGRAM(s) SubCutaneous every 24 hours  hyoscyamine SL 0.25 milliGRAM(s) SubLingual three times a day  metoprolol tartrate Injectable 5 milliGRAM(s) IV Push every 8 hours  pantoprazole  Injectable 40 milliGRAM(s) IV Push two times a day  piperacillin/tazobactam IVPB.. 3.375 Gram(s) IV Intermittent every 8 hours  sodium chloride 0.9%. 1000 milliLiter(s) (50 mL/Hr) IV Continuous <Continuous>    MEDICATIONS  (PRN):  ondansetron Injectable 4 milliGRAM(s) IV Push every 6 hours PRN Nausea and/or Vomiting      Allergies    No Known Allergies    Intolerances        ROS:   General:  No  fevers, chills, night sweats, fatigue,   Eyes:  Good vision, no reported pain  ENT:  No sore throat, pain, runny nose, dysphagia  CV:  No pain, palpitations, hypo/hypertension  Resp:  No dyspnea, cough, tachypnea, wheezing  GI:  No pain, No nausea, No vomiting, No diarrhea, No constipation, No weight loss, No fever, No pruritis, No rectal bleeding, No tarry stools, No dysphagia,  :  No pain, bleeding, incontinence, nocturia  Muscle:  No pain, weakness  Neuro:  No weakness, tingling, memory problems  Psych:  No fatigue, insomnia, mood problems, depression  Endocrine:  No polyuria, polydipsia, cold/heat intolerance  Heme:  No petechiae, ecchymosis, easy bruisability  Skin:  No rash, tattoos, scars, edema      PHYSICAL EXAM:   Vital Signs:  Vital Signs Last 24 Hrs  T(C): 36.8 (25 Oct 2024 07:45), Max: 37.1 (24 Oct 2024 15:37)  T(F): 98.3 (25 Oct 2024 07:45), Max: 98.8 (24 Oct 2024 22:34)  HR: 61 (25 Oct 2024 04:57) (61 - 81)  BP: 167/71 (25 Oct 2024 04:57) (161/81 - 190/85)  BP(mean): 96 (25 Oct 2024 04:57) (96 - 110)  RR: 21 (25 Oct 2024 04:57) (20 - 25)  SpO2: 97% (25 Oct 2024 04:57) (96% - 98%)    Parameters below as of 25 Oct 2024 04:57  Patient On (Oxygen Delivery Method): nasal cannula  O2 Flow (L/min): 4    Daily     Daily Weight in k (25 Oct 2024 05:01)    GENERAL:  Appears stated age,   HEENT:  NC/AT,    CHEST:  Full & symmetric excursion,   HEART:  Regular rhythm,  ABDOMEN:  Soft, non-tender, non-distended,  EXTEREMITIES:  no cyanosis  SKIN:  No rash  NEURO:  Alert,       LABS:                        12.5   7.77  )-----------( 148      ( 25 Oct 2024 05:45 )             38.1     10-25    144  |  108  |  43[H]  ----------------------------<  87  3.8   |  28  |  0.98    Ca    9.6      25 Oct 2024 05:45  Mg     1.9     10-25        Urinalysis Basic - ( 25 Oct 2024 05:45 )    Color: x / Appearance: x / SG: x / pH: x  Gluc: 87 mg/dL / Ketone: x  / Bili: x / Urobili: x   Blood: x / Protein: x / Nitrite: x   Leuk Esterase: x / RBC: x / WBC x   Sq Epi: x / Non Sq Epi: x / Bacteria: x        RADIOLOGY & ADDITIONAL TESTS:  
     KIAN WOODS is a 78yMale , patient examined and chart reviewed.      INTERVAL HPI/ OVERNIGHT EVENTS:   No events. Afebrile.    PAST MEDICAL & SURGICAL HISTORY:  Dysphagia      For details regarding the patient's social history, family history, and other miscellaneous elements, please refer the initial infectious diseases consultation and/or the admitting history and physical examination for this admission.    ROS:  CONSTITUTIONAL:  Negative fever or chills  EYES:  Negative  blurry vision or double vision  CARDIOVASCULAR:  Negative for chest pain or palpitations  RESPIRATORY:  Negative for cough, wheezing, or SOB   GASTROINTESTINAL:  Negative for nausea, vomiting, diarrhea, constipation, or abdominal pain  GENITOURINARY:  Negative frequency, urgency or dysuria  NEUROLOGIC:  No headache, confusion, dizziness, lightheadedness  All other systems were reviewed and are negative     No Known Allergies      Current inpatient medications :    ANTIBIOTICS/RELEVANT:  piperacillin/tazobactam IVPB.. 3.375 Gram(s) IV Intermittent every 8 hours      enoxaparin Injectable 40 milliGRAM(s) SubCutaneous every 24 hours  hyoscyamine SL 0.25 milliGRAM(s) SubLingual three times a day  ondansetron Injectable 4 milliGRAM(s) IV Push every 6 hours PRN  pantoprazole  Injectable 40 milliGRAM(s) IV Push two times a day  sodium chloride 0.9%. 1000 milliLiter(s) IV Continuous <Continuous>      Objective:    10-23 @ 07:01  -  10-24 @ 07:00  --------------------------------------------------------  IN: 1100 mL / OUT: 500 mL / NET: 600 mL      T(C): 37.1 (10-24-24 @ 15:37), Max: 37.7 (10-23-24 @ 21:11)  HR: 71 (10-24-24 @ 12:00) (67 - 79)  BP: 177/83 (10-24-24 @ 12:00) (48/74 - 177/83)  RR: 25 (10-24-24 @ 12:00) (19 - 30)  SpO2: 97% (10-24-24 @ 12:00) (92% - 97%)      Physical Exam:  General: no acute distress  Neck: supple, trachea midline  Lungs: decreased, no wheeze/rhonchi  Cardiovascular: regular rate and rhythm, S1 S2  Abdomen: soft, nontender,  bowel sounds normal  Neurological: alert and oriented x3  Skin: no rash  Extremities: no edema      LABS:                          12.0   5.41  )-----------( 126      ( 24 Oct 2024 06:00 )             36.3       10-24    143  |  106  |  39[H]  ----------------------------<  94  3.4[L]   |  28  |  1.03    Ca    9.4      24 Oct 2024 06:00  Mg     2.0     10-24    TPro  6.3  /  Alb  3.2[L]  /  TBili  2.6[H]  /  DBili  x   /  AST  22  /  ALT  17  /  AlkPhos  71  10-23      PT/INR - ( 22 Oct 2024 22:09 )   PT: 15.0 sec;   INR: 1.30 ratio         PTT - ( 22 Oct 2024 22:09 )  PTT:29.3 sec    MICROBIOLOGY:  Culture - Blood (collected 22 Oct 2024 22:09)  Source: .Blood BLOOD  Preliminary Report (24 Oct 2024 13:01):    No growth at 24 hours    Culture - Blood (collected 22 Oct 2024 22:09)  Source: .Blood BLOOD  Preliminary Report (24 Oct 2024 13:01):    No growth at 24 hours    RADIOLOGY & ADDITIONAL STUDIES:    ACC: [id number] EXAM:  XR CHEST AP OR PA 1V   ORDERED BY: WALESKA MCMULLEN     PROCEDURE DATE:  10/22/2024          INTERPRETATION:  HISTORY: Sepsis    TECHNIQUE: A single AP view of the chest was obtained.    COMPARISON: None.    FINDINGS: The heart size cannot be adequately assessed on this single   view. Patient status post median sternotomy and CABG. There is a   left-sided dual-lead pacemaker with leads in good position. There is a   central large patchy airspace opacity in the right lung. There are no   pleural effusions. The left lung is grossly clear.    IMPRESSION: Large patchy airspace opacity in the medial aspect of the   right lung, likely pneumonia. Please correlate with the pending CT scan   of the chest.  < from: Xray Chest 1 View AP/PA (10.22.24 @ 22:29) >    ACC: [id number] EXAM:  XR CHEST AP OR PA 1V   ORDERED BY: WALESKA MCMULLEN     PROCEDURE DATE:  10/22/2024          INTERPRETATION:  HISTORY: Sepsis    TECHNIQUE: A single AP view of the chest was obtained.    COMPARISON: None.    FINDINGS: The heart size cannot be adequately assessed on this single   view. Patient status post median sternotomy and CABG. There is a   left-sided dual-lead pacemaker with leads in good position. There is a   central large patchy airspace opacity in the right lung. There are no   pleural effusions. The left lung is grossly clear.    IMPRESSION:lease correlate with the pending CT scan   of the chest.    Assessment :   77YO M with PMH of HLD GI motility issues dysphagia who presented to the hospital with c/o regurgitating everything he eats or drinks. In ER he was found to have T 100.4 WBC wnl and SaO2 89% on RA. CXR with Large patchy airspace opacity in the medial aspect of the right lung, likely pneumonia. Admitted with Asp pna  Afebrile wbc wnl    Plan :   Cont Zosyn  Fu cultures  Trend temps and cbc  MBS  Asp precautions    Continue with present regiment.  Appropriate use of antibiotics and adverse effects reviewed.      I have discussed the above plan of care with patient in detail. He expressed understanding of the  treatment plan . Risks, benefits and alternatives discussed in detail. I have asked if he has any questions or concerns and appropriately addressed them to the best of my ability .    > 35 minutes were spent in direct patient care reviewing notes, medications ,labs data/ imaging , discussion with multidisciplinary team.    Thank you for allowing me to participate in care of your patient .    Brynn Wells MD  Infectious Disease  [phone]
     KIAN WOODS is a 78yMale , patient examined and chart reviewed.      INTERVAL HPI/ OVERNIGHT EVENTS:   No events. Afebrile.    PAST MEDICAL & SURGICAL HISTORY:  Dysphagia      For details regarding the patient's social history, family history, and other miscellaneous elements, please refer the initial infectious diseases consultation and/or the admitting history and physical examination for this admission.    ROS:  CONSTITUTIONAL:  Negative fever or chills  EYES:  Negative  blurry vision or double vision  CARDIOVASCULAR:  Negative for chest pain or palpitations  RESPIRATORY:  Negative for cough, wheezing, or SOB   GASTROINTESTINAL:  Negative for nausea, vomiting, diarrhea, constipation, or abdominal pain  GENITOURINARY:  Negative frequency, urgency or dysuria  NEUROLOGIC:  No headache, confusion, dizziness, lightheadedness  All other systems were reviewed and are negative     No Known Allergies      Current inpatient medications :    ANTIBIOTICS/RELEVANT:  piperacillin/tazobactam IVPB.. 3.375 Gram(s) IV Intermittent every 8 hours    MEDICATIONS  (STANDING):  aspirin  chewable 81 milliGRAM(s) Oral daily  atorvastatin 10 milliGRAM(s) Oral at bedtime  clonazePAM  Tablet 1.5 milliGRAM(s) Oral at bedtime  enoxaparin Injectable 40 milliGRAM(s) SubCutaneous every 24 hours  gabapentin 100 milliGRAM(s) Oral daily  hyoscyamine SL 0.25 milliGRAM(s) SubLingual three times a day  isosorbide   dinitrate Tablet (ISORDIL) 5 milliGRAM(s) Oral three times a day  lactobacillus acidophilus 1 Tablet(s) Oral daily  melatonin 5 milliGRAM(s) Oral at bedtime  pantoprazole    Tablet 40 milliGRAM(s) Oral two times a day  potassium chloride   Powder 20 milliEquivalent(s) Oral daily  QUEtiapine 25 milliGRAM(s) Oral at bedtime  sucralfate 1 Gram(s) Oral four times a day    MEDICATIONS  (PRN):  acetaminophen     Tablet .. 650 milliGRAM(s) Oral every 6 hours PRN Mild Pain (1 - 3)  albuterol    90 MICROgram(s) HFA Inhaler 2 Puff(s) Inhalation every 6 hours PRN Shortness of Breath and/or Wheezing  ondansetron Injectable 4 milliGRAM(s) IV Push every 6 hours PRN Nausea and/or Vomiting      Objective:  Vital Signs Last 24 Hrs  T(C): 36.7 (29 Oct 2024 16:03), Max: 37.3 (28 Oct 2024 20:27)  T(F): 98.1 (29 Oct 2024 16:03), Max: 99.2 (28 Oct 2024 20:27)  HR: 60 (29 Oct 2024 16:03) (60 - 63)  BP: 166/80 (29 Oct 2024 16:03) (137/64 - 167/66)  BP(mean): 89 (29 Oct 2024 07:51) (89 - 89)  RR: 18 (29 Oct 2024 16:03) (18 - 20)  SpO2: 94% (29 Oct 2024 16:03) (89% - 97%)    Parameters below as of 29 Oct 2024 16:03  Patient On (Oxygen Delivery Method): nasal cannula  O2 Flow (L/min): 2      Physical Exam:  General: no acute distress  Neck: supple, trachea midline  Lungs: decreased, no wheeze/rhonchi  Cardiovascular: regular rate and rhythm, S1 S2  Abdomen: soft, nontender,  bowel sounds normal  Neurological: alert awake  Skin: no rash  Extremities: no edema      LABS:                        12.7   8.32  )-----------( 155      ( 29 Oct 2024 06:00 )             37.1   10-29    140  |  104  |  25[H]  ----------------------------<  102[H]  3.6   |  29  |  1.01    Ca    9.1      29 Oct 2024 06:00  Mg     2.0     10-29    TPro  5.3[L]  /  Alb  2.4[L]  /  TBili  1.2  /  DBili  x   /  AST  13  /  ALT  20  /  AlkPhos  57  10-28      MICROBIOLOGY:  Culture - Blood (collected 22 Oct 2024 22:09)  Source: .Blood BLOOD  Preliminary Report (24 Oct 2024 13:01):    No growth at 24 hours    Culture - Blood (collected 22 Oct 2024 22:09)  Source: .Blood BLOOD  Preliminary Report (24 Oct 2024 13:01):    No growth at 24 hours    RADIOLOGY & ADDITIONAL STUDIES:    ACC: [id number] EXAM:  XR CHEST AP OR PA 1V   ORDERED BY: WALESKA MCMULLEN     PROCEDURE DATE:  10/22/2024          INTERPRETATION:  HISTORY: Sepsis    TECHNIQUE: A single AP view of the chest was obtained.    COMPARISON: None.    FINDINGS: The heart size cannot be adequately assessed on this single   view. Patient status post median sternotomy and CABG. There is a   left-sided dual-lead pacemaker with leads in good position. There is a   central large patchy airspace opacity in the right lung. There are no   pleural effusions. The left lung is grossly clear.    IMPRESSION: Large patchy airspace opacity in the medial aspect of the   right lung, likely pneumonia. Please correlate with the pending CT scan   of the chest.  < from: Xray Chest 1 View AP/PA (10.22.24 @ 22:29) >    ACC: [id number] EXAM:  XR CHEST AP OR PA 1V   ORDERED BY: WALESKA MCMULLEN     PROCEDURE DATE:  10/22/2024          INTERPRETATION:  HISTORY: Sepsis    TECHNIQUE: A single AP view of the chest was obtained.    COMPARISON: None.    FINDINGS: The heart size cannot be adequately assessed on this single   view. Patient status post median sternotomy and CABG. There is a   left-sided dual-lead pacemaker with leads in good position. There is a   central large patchy airspace opacity in the right lung. There are no   pleural effusions. The left lung is grossly clear.    IMPRESSION:lease correlate with the pending CT scan   of the chest.    Assessment :   77YO M with PMH of HLD GI motility issues dysphagia who presented to the hospital with c/o regurgitating everything he eats or drinks. In ER he was found to have T 100.4 WBC wnl and SaO2 89% on RA. CXR with Large patchy airspace opacity in the medial aspect of the right lung, likely pneumonia. Admitted with Asp pna  Afebrile wbc wnl  Sp MBS - diet modified  Clinically stable    Plan :   Cont Zosyn till 10/30  Can change to po Augmentin to finish course on dc  Trend temps and cbc  Asp precautions  Stable from ID standpoint  Dc planning per primary team    Continue with present regiment.  Appropriate use of antibiotics and adverse effects reviewed.    > 35 minutes were spent in direct patient care reviewing notes, medications ,labs data/ imaging , discussion with multidisciplinary team.    Thank you for allowing me to participate in care of your patient .    Brynn Wells MD  Infectious Disease  [phone]
     KIAN WOODS is a 78yMale , patient examined and chart reviewed.      INTERVAL HPI/ OVERNIGHT EVENTS:   No events. Afebrile.  Had MBS today.    PAST MEDICAL & SURGICAL HISTORY:  Dysphagia      For details regarding the patient's social history, family history, and other miscellaneous elements, please refer the initial infectious diseases consultation and/or the admitting history and physical examination for this admission.    ROS:  CONSTITUTIONAL:  Negative fever or chills  EYES:  Negative  blurry vision or double vision  CARDIOVASCULAR:  Negative for chest pain or palpitations  RESPIRATORY:  Negative for cough, wheezing, or SOB   GASTROINTESTINAL:  Negative for nausea, vomiting, diarrhea, constipation, or abdominal pain  GENITOURINARY:  Negative frequency, urgency or dysuria  NEUROLOGIC:  No headache, confusion, dizziness, lightheadedness  All other systems were reviewed and are negative     No Known Allergies      Current inpatient medications :    ANTIBIOTICS/RELEVANT:  piperacillin/tazobactam IVPB.. 3.375 Gram(s) IV Intermittent every 8 hours    MEDICATIONS  (STANDING):  atorvastatin 10 milliGRAM(s) Oral at bedtime  clonazePAM  Tablet 1.5 milliGRAM(s) Oral at bedtime  enoxaparin Injectable 40 milliGRAM(s) SubCutaneous every 24 hours  hyoscyamine SL 0.25 milliGRAM(s) SubLingual three times a day  isosorbide   dinitrate Tablet (ISORDIL) 5 milliGRAM(s) Oral two times a day  methylPREDNISolone sodium succinate Injectable 20 milliGRAM(s) IV Push every 24 hours  pantoprazole    Tablet 40 milliGRAM(s) Oral two times a day  sodium chloride 0.9%. 1000 milliLiter(s) (50 mL/Hr) IV Continuous <Continuous>  sucralfate 1 Gram(s) Oral four times a day    MEDICATIONS  (PRN):  ondansetron Injectable 4 milliGRAM(s) IV Push every 6 hours PRN Nausea and/or Vomiting        Objective:  Vital Signs Last 24 Hrs  T(C): 36.7 (25 Oct 2024 11:55), Max: 37.1 (24 Oct 2024 15:37)  T(F): 98 (25 Oct 2024 11:55), Max: 98.8 (24 Oct 2024 22:34)  HR: 61 (25 Oct 2024 04:57) (61 - 81)  BP: 167/71 (25 Oct 2024 04:57) (161/81 - 190/85)  BP(mean): 96 (25 Oct 2024 04:57) (96 - 108)  RR: 21 (25 Oct 2024 04:57) (20 - 24)  SpO2: 97% (25 Oct 2024 04:57) (96% - 98%)    Parameters below as of 25 Oct 2024 04:57  Patient On (Oxygen Delivery Method): nasal cannula  O2 Flow (L/min): 4    Physical Exam:  General: no acute distress  Neck: supple, trachea midline  Lungs: decreased, no wheeze/rhonchi  Cardiovascular: regular rate and rhythm, S1 S2  Abdomen: soft, nontender,  bowel sounds normal  Neurological: alert and oriented x3  Skin: no rash  Extremities: no edema      LABS:                               12.5   7.77  )-----------( 148      ( 25 Oct 2024 05:45 )             38.1   10-25    144  |  108  |  43[H]  ----------------------------<  87  3.8   |  28  |  0.98    Ca    9.6      25 Oct 2024 05:45  Mg     1.9     10-25    MICROBIOLOGY:  Culture - Blood (collected 22 Oct 2024 22:09)  Source: .Blood BLOOD  Preliminary Report (24 Oct 2024 13:01):    No growth at 24 hours    Culture - Blood (collected 22 Oct 2024 22:09)  Source: .Blood BLOOD  Preliminary Report (24 Oct 2024 13:01):    No growth at 24 hours    RADIOLOGY & ADDITIONAL STUDIES:    ACC: [id number] EXAM:  XR CHEST AP OR PA 1V   ORDERED BY: WALESKA MCMULLEN     PROCEDURE DATE:  10/22/2024          INTERPRETATION:  HISTORY: Sepsis    TECHNIQUE: A single AP view of the chest was obtained.    COMPARISON: None.    FINDINGS: The heart size cannot be adequately assessed on this single   view. Patient status post median sternotomy and CABG. There is a   left-sided dual-lead pacemaker with leads in good position. There is a   central large patchy airspace opacity in the right lung. There are no   pleural effusions. The left lung is grossly clear.    IMPRESSION: Large patchy airspace opacity in the medial aspect of the   right lung, likely pneumonia. Please correlate with the pending CT scan   of the chest.  < from: Xray Chest 1 View AP/PA (10.22.24 @ 22:29) >    ACC: [id number] EXAM:  XR CHEST AP OR PA 1V   ORDERED BY: WALESKA MCMULLEN     PROCEDURE DATE:  10/22/2024          INTERPRETATION:  HISTORY: Sepsis    TECHNIQUE: A single AP view of the chest was obtained.    COMPARISON: None.    FINDINGS: The heart size cannot be adequately assessed on this single   view. Patient status post median sternotomy and CABG. There is a   left-sided dual-lead pacemaker with leads in good position. There is a   central large patchy airspace opacity in the right lung. There are no   pleural effusions. The left lung is grossly clear.    IMPRESSION:lease correlate with the pending CT scan   of the chest.    Assessment :   77YO M with PMH of HLD GI motility issues dysphagia who presented to the hospital with c/o regurgitating everything he eats or drinks. In ER he was found to have T 100.4 WBC wnl and SaO2 89% on RA. CXR with Large patchy airspace opacity in the medial aspect of the right lung, likely pneumonia. Admitted with Asp pna  Afebrile wbc wnl  Had MBS today- diet modified    Plan :   Cont Zosyn till 10/30  Can change to po Augmentin to finish course on dc  Trend temps and cbc  Asp precautions  Stable from ID standpoint    Continue with present regiment.  Appropriate use of antibiotics and adverse effects reviewed.    > 35 minutes were spent in direct patient care reviewing notes, medications ,labs data/ imaging , discussion with multidisciplinary team.    Thank you for allowing me to participate in care of your patient .    Brynn Wells MD  Infectious Disease  [phone]
Date/Time Patient Seen:  		  Referring MD:   Data Reviewed	       Patient is a 78y old  Male who presents with a chief complaint of Asp PNA (24 Oct 2024 12:08)      Subjective/HPI     PAST MEDICAL & SURGICAL HISTORY:  No pertinent past medical history    Dysphagia          Medication list         MEDICATIONS  (STANDING):  enoxaparin Injectable 40 milliGRAM(s) SubCutaneous every 24 hours  hyoscyamine SL 0.25 milliGRAM(s) SubLingual three times a day  pantoprazole  Injectable 40 milliGRAM(s) IV Push two times a day  piperacillin/tazobactam IVPB.. 3.375 Gram(s) IV Intermittent every 8 hours  sodium chloride 0.9%. 1000 milliLiter(s) (100 mL/Hr) IV Continuous <Continuous>    MEDICATIONS  (PRN):  ondansetron Injectable 4 milliGRAM(s) IV Push every 6 hours PRN Nausea and/or Vomiting         Vitals log        ICU Vital Signs Last 24 Hrs  T(C): 36.7 (25 Oct 2024 05:01), Max: 37.1 (24 Oct 2024 15:37)  T(F): 98 (25 Oct 2024 05:01), Max: 98.8 (24 Oct 2024 22:34)  HR: 81 (24 Oct 2024 20:03) (66 - 81)  BP: 161/81 (24 Oct 2024 20:03) (159/68 - 190/85)  BP(mean): 104 (24 Oct 2024 20:03) (96 - 110)  ABP: --  ABP(mean): --  RR: 24 (24 Oct 2024 20:03) (20 - 25)  SpO2: 98% (24 Oct 2024 20:03) (96% - 98%)             Input and Output:  I&O's Detail    23 Oct 2024 07:01  -  24 Oct 2024 07:00  --------------------------------------------------------  IN:    IV PiggyBack: 200 mL    sodium chloride 0.9%: 900 mL  Total IN: 1100 mL    OUT:    Blood Loss (mL): 500 mL  Total OUT: 500 mL    Total NET: 600 mL      24 Oct 2024 07:01  -  25 Oct 2024 05:42  --------------------------------------------------------  IN:    sodium chloride 0.9%: 500 mL  Total IN: 500 mL    OUT:    Voided (mL): 550 mL  Total OUT: 550 mL    Total NET: -50 mL          Lab Data                        12.0   5.41  )-----------( 126      ( 24 Oct 2024 06:00 )             36.3     10-24    143  |  106  |  39[H]  ----------------------------<  94  3.4[L]   |  28  |  1.03    Ca    9.4      24 Oct 2024 06:00  Mg     2.0     10-24    TPro  6.3  /  Alb  3.2[L]  /  TBili  2.6[H]  /  DBili  x   /  AST  22  /  ALT  17  /  AlkPhos  71  10-23            Review of Systems	      Objective     Physical Examination    heart s1s2  lung dc BS  head nc  head at      Pertinent Lab findings & Imaging      Howard:  NO   Adequate UO     I&O's Detail    23 Oct 2024 07:01  -  24 Oct 2024 07:00  --------------------------------------------------------  IN:    IV PiggyBack: 200 mL    sodium chloride 0.9%: 900 mL  Total IN: 1100 mL    OUT:    Blood Loss (mL): 500 mL  Total OUT: 500 mL    Total NET: 600 mL      24 Oct 2024 07:01  -  25 Oct 2024 05:42  --------------------------------------------------------  IN:    sodium chloride 0.9%: 500 mL  Total IN: 500 mL    OUT:    Voided (mL): 550 mL  Total OUT: 550 mL    Total NET: -50 mL               Discussed with:     Cultures:	        Radiology                            
Date/Time Patient Seen:  		  Referring MD:   Data Reviewed	       Patient is a 78y old  Male who presents with a chief complaint of Asp PNA (26 Oct 2024 15:56)      Subjective/HPI     PAST MEDICAL & SURGICAL HISTORY:  No pertinent past medical history    Dysphagia          Medication list         MEDICATIONS  (STANDING):  atorvastatin 10 milliGRAM(s) Oral at bedtime  clonazePAM  Tablet 1.5 milliGRAM(s) Oral at bedtime  enoxaparin Injectable 40 milliGRAM(s) SubCutaneous every 24 hours  gabapentin 100 milliGRAM(s) Oral daily  hyoscyamine SL 0.25 milliGRAM(s) SubLingual three times a day  isosorbide   dinitrate Tablet (ISORDIL) 5 milliGRAM(s) Oral two times a day  lactobacillus acidophilus 1 Tablet(s) Oral daily  melatonin 5 milliGRAM(s) Oral at bedtime  pantoprazole    Tablet 40 milliGRAM(s) Oral two times a day  piperacillin/tazobactam IVPB.. 3.375 Gram(s) IV Intermittent every 8 hours  potassium chloride   Solution 10 milliEquivalent(s) Oral three times a day  QUEtiapine 12.5 milliGRAM(s) Oral at bedtime  sucralfate 1 Gram(s) Oral four times a day    MEDICATIONS  (PRN):  albuterol    90 MICROgram(s) HFA Inhaler 2 Puff(s) Inhalation every 6 hours PRN Shortness of Breath and/or Wheezing  haloperidol    Injectable 1 milliGRAM(s) IntraMuscular every 6 hours PRN Agitation  ondansetron Injectable 4 milliGRAM(s) IV Push every 6 hours PRN Nausea and/or Vomiting         Vitals log        ICU Vital Signs Last 24 Hrs  T(C): 36.3 (27 Oct 2024 00:33), Max: 36.4 (26 Oct 2024 10:32)  T(F): 97.3 (27 Oct 2024 00:33), Max: 97.6 (26 Oct 2024 10:32)  HR: 66 (27 Oct 2024 00:33) (63 - 66)  BP: 170/73 (27 Oct 2024 00:33) (168/67 - 170/73)  BP(mean): 100 (26 Oct 2024 21:19) (100 - 100)  ABP: --  ABP(mean): --  RR: 17 (27 Oct 2024 00:33) (17 - 18)  SpO2: 92% (27 Oct 2024 00:33) (90% - 93%)    O2 Parameters below as of 27 Oct 2024 00:33  Patient On (Oxygen Delivery Method): nasal cannula                 Input and Output:  I&O's Detail    25 Oct 2024 07:01  -  26 Oct 2024 07:00  --------------------------------------------------------  IN:    sodium chloride 0.9%: 200 mL  Total IN: 200 mL    OUT:    Voided (mL): 650 mL  Total OUT: 650 mL    Total NET: -450 mL      26 Oct 2024 07:01  -  27 Oct 2024 05:47  --------------------------------------------------------  IN:    IV PiggyBack: 100 mL  Total IN: 100 mL    OUT:  Total OUT: 0 mL    Total NET: 100 mL          Lab Data                        11.9   10.50 )-----------( 152      ( 26 Oct 2024 05:30 )             36.2     10-26    139  |  104  |  37[H]  ----------------------------<  110[H]  3.3[L]   |  28  |  0.91    Ca    9.2      26 Oct 2024 05:30  Mg     2.0     10-26    TPro  5.7[L]  /  Alb  2.6[L]  /  TBili  1.8[H]  /  DBili  x   /  AST  18  /  ALT  17  /  AlkPhos  57  10-26            Review of Systems	      Objective     Physical Examination    heart s1s2  lung dc bS  head nc  head at      Pertinent Lab findings & Imaging      Howard:  NO   Adequate UO     I&O's Detail    25 Oct 2024 07:01  -  26 Oct 2024 07:00  --------------------------------------------------------  IN:    sodium chloride 0.9%: 200 mL  Total IN: 200 mL    OUT:    Voided (mL): 650 mL  Total OUT: 650 mL    Total NET: -450 mL      26 Oct 2024 07:01  -  27 Oct 2024 05:47  --------------------------------------------------------  IN:    IV PiggyBack: 100 mL  Total IN: 100 mL    OUT:  Total OUT: 0 mL    Total NET: 100 mL               Discussed with:     Cultures:	        Radiology                            
Date/Time Patient Seen:  		  Referring MD:   Data Reviewed	       Patient is a 78y old  Male who presents with a chief complaint of Pneumonitis due to inhalation of food or vomitus    Per HPI:  This is a 78 M with PMH of HLD GI motility issues who came in c/o that he keeps regurgitating everything he eats or drinks. Patient is follows with a GI doctor. He has a h/o problems swallowing and was placed on a  dicyclomine to help with his dysphagia. Patient says he developed a rash. Patient saw a dermatologist and was prescribed prednisone. The dermatologist told him to stop the dicyclomine. He says his GI unaware that pt stopped taking his dicyclomine. He denies SOB. In the ER he was found to have T 100.4 and SaO2 89% on RA. (23 Oct 2024 05:30) (23 Oct 2024 13:56)      Subjective/HPI     PAST MEDICAL & SURGICAL HISTORY:  No pertinent past medical history    Dysphagia          Medication list         MEDICATIONS  (STANDING):  enoxaparin Injectable 40 milliGRAM(s) SubCutaneous every 24 hours  pantoprazole  Injectable 40 milliGRAM(s) IV Push two times a day  piperacillin/tazobactam IVPB.. 3.375 Gram(s) IV Intermittent every 8 hours  sodium chloride 0.9%. 1000 milliLiter(s) (100 mL/Hr) IV Continuous <Continuous>    MEDICATIONS  (PRN):  ondansetron Injectable 4 milliGRAM(s) IV Push every 6 hours PRN Nausea and/or Vomiting         Vitals log        ICU Vital Signs Last 24 Hrs  T(C): 37.1 (24 Oct 2024 04:00), Max: 37.7 (23 Oct 2024 21:11)  T(F): 98.8 (24 Oct 2024 04:00), Max: 99.8 (23 Oct 2024 21:11)  HR: 75 (24 Oct 2024 04:00) (71 - 81)  BP: 48/74 (24 Oct 2024 04:00) (48/74 - 161/75)  BP(mean): 93 (24 Oct 2024 04:00) (85 - 95)  ABP: --  ABP(mean): --  RR: 30 (24 Oct 2024 04:00) (14 - 30)  SpO2: 97% (24 Oct 2024 04:00) (84% - 97%)    O2 Parameters below as of 24 Oct 2024 04:00  Patient On (Oxygen Delivery Method): nasal cannula  O2 Flow (L/min): 4               Input and Output:  I&O's Detail    23 Oct 2024 07:01  -  24 Oct 2024 05:21  --------------------------------------------------------  IN:    IV PiggyBack: 200 mL    sodium chloride 0.9%: 900 mL  Total IN: 1100 mL    OUT:    Blood Loss (mL): 500 mL  Total OUT: 500 mL    Total NET: 600 mL          Lab Data                        12.3   8.44  )-----------( 158      ( 23 Oct 2024 05:45 )             36.7     10-23    140  |  104  |  38[H]  ----------------------------<  127[H]  3.7   |  26  |  1.19    Ca    9.2      23 Oct 2024 05:45    TPro  6.3  /  Alb  3.2[L]  /  TBili  2.6[H]  /  DBili  x   /  AST  22  /  ALT  17  /  AlkPhos  71  10-23            Review of Systems	      Objective     Physical Examination    heart s1s2  lung dc BS  head nc  head at      Pertinent Lab findings & Imaging      Howard:  NO   Adequate UO     I&O's Detail    23 Oct 2024 07:01  -  24 Oct 2024 05:21  --------------------------------------------------------  IN:    IV PiggyBack: 200 mL    sodium chloride 0.9%: 900 mL  Total IN: 1100 mL    OUT:    Blood Loss (mL): 500 mL  Total OUT: 500 mL    Total NET: 600 mL               Discussed with:     Cultures:	        Radiology                            
[facility]  Napoleon Gill PA-C  [address] [place] [address]  [phone]      INTERVAL HPI/OVERNIGHT EVENTS: swallow evaluation noted    MEDICATIONS  (STANDING):  enoxaparin Injectable 40 milliGRAM(s) SubCutaneous every 24 hours  hyoscyamine SL 0.25 milliGRAM(s) SubLingual three times a day  metoprolol tartrate Injectable 5 milliGRAM(s) IV Push every 8 hours  pantoprazole  Injectable 40 milliGRAM(s) IV Push two times a day  piperacillin/tazobactam IVPB.. 3.375 Gram(s) IV Intermittent every 8 hours  sodium chloride 0.9%. 1000 milliLiter(s) (50 mL/Hr) IV Continuous <Continuous>    MEDICATIONS  (PRN):  ondansetron Injectable 4 milliGRAM(s) IV Push every 6 hours PRN Nausea and/or Vomiting      Allergies    No Known Allergies    Intolerances        ROS:   General:  No  fevers, chills, night sweats, fatigue,   Eyes:  Good vision, no reported pain  ENT:  No sore throat, pain, runny nose, dysphagia  CV:  No pain, palpitations, hypo/hypertension  Resp:  No dyspnea, cough, tachypnea, wheezing  GI:  No pain, No nausea, No vomiting, No diarrhea, No constipation, No weight loss, No fever, No pruritis, No rectal bleeding, No tarry stools, No dysphagia,  :  No pain, bleeding, incontinence, nocturia  Muscle:  No pain, weakness  Neuro:  No weakness, tingling, memory problems  Psych:  No fatigue, insomnia, mood problems, depression  Endocrine:  No polyuria, polydipsia, cold/heat intolerance  Heme:  No petechiae, ecchymosis, easy bruisability  Skin:  No rash, tattoos, scars, edema      PHYSICAL EXAM:   Vital Signs:  Vital Signs Last 24 Hrs  T(C): 36.8 (25 Oct 2024 07:45), Max: 37.1 (24 Oct 2024 15:37)  T(F): 98.3 (25 Oct 2024 07:45), Max: 98.8 (24 Oct 2024 22:34)  HR: 61 (25 Oct 2024 04:57) (61 - 81)  BP: 167/71 (25 Oct 2024 04:57) (161/81 - 190/85)  BP(mean): 96 (25 Oct 2024 04:57) (96 - 110)  RR: 21 (25 Oct 2024 04:57) (20 - 25)  SpO2: 97% (25 Oct 2024 04:57) (96% - 98%)    Parameters below as of 25 Oct 2024 04:57  Patient On (Oxygen Delivery Method): nasal cannula  O2 Flow (L/min): 4    Daily     Daily Weight in k (25 Oct 2024 05:01)    GENERAL:  Appears stated age,   HEENT:  NC/AT,    CHEST:  Full & symmetric excursion,   HEART:  Regular rhythm,  ABDOMEN:  Soft, non-tender, non-distended,  EXTEREMITIES:  no cyanosis  SKIN:  No rash  NEURO:  Alert,       LABS:                        12.5   7.77  )-----------( 148      ( 25 Oct 2024 05:45 )             38.1     10-25    144  |  108  |  43[H]  ----------------------------<  87  3.8   |  28  |  0.98    Ca    9.6      25 Oct 2024 05:45  Mg     1.9     10-25        Urinalysis Basic - ( 25 Oct 2024 05:45 )    Color: x / Appearance: x / SG: x / pH: x  Gluc: 87 mg/dL / Ketone: x  / Bili: x / Urobili: x   Blood: x / Protein: x / Nitrite: x   Leuk Esterase: x / RBC: x / WBC x   Sq Epi: x / Non Sq Epi: x / Bacteria: x        RADIOLOGY & ADDITIONAL TESTS:  
[facility]  Napoleon Gill PA-C  [address] [place] [address]  [phone]      INTERVAL HPI/OVERNIGHT EVENTS:  no acute events    MEDICATIONS  (STANDING):  enoxaparin Injectable 40 milliGRAM(s) SubCutaneous every 24 hours  hyoscyamine SL 0.25 milliGRAM(s) SubLingual three times a day  metoprolol tartrate Injectable 5 milliGRAM(s) IV Push every 8 hours  pantoprazole  Injectable 40 milliGRAM(s) IV Push two times a day  piperacillin/tazobactam IVPB.. 3.375 Gram(s) IV Intermittent every 8 hours  sodium chloride 0.9%. 1000 milliLiter(s) (50 mL/Hr) IV Continuous <Continuous>    MEDICATIONS  (PRN):  ondansetron Injectable 4 milliGRAM(s) IV Push every 6 hours PRN Nausea and/or Vomiting      Allergies    No Known Allergies    Intolerances        ROS:   General:  No  fevers, chills, night sweats, fatigue,   Eyes:  Good vision, no reported pain  ENT:  No sore throat, pain, runny nose, dysphagia  CV:  No pain, palpitations, hypo/hypertension  Resp:  No dyspnea, cough, tachypnea, wheezing  GI:  No pain, No nausea, No vomiting, No diarrhea, No constipation, No weight loss, No fever, No pruritis, No rectal bleeding, No tarry stools, No dysphagia,  :  No pain, bleeding, incontinence, nocturia  Muscle:  No pain, weakness  Neuro:  No weakness, tingling, memory problems  Psych:  No fatigue, insomnia, mood problems, depression  Endocrine:  No polyuria, polydipsia, cold/heat intolerance  Heme:  No petechiae, ecchymosis, easy bruisability  Skin:  No rash, tattoos, scars, edema      PHYSICAL EXAM:   Vital Signs:  Vital Signs Last 24 Hrs  T(C): 36.8 (25 Oct 2024 07:45), Max: 37.1 (24 Oct 2024 15:37)  T(F): 98.3 (25 Oct 2024 07:45), Max: 98.8 (24 Oct 2024 22:34)  HR: 61 (25 Oct 2024 04:57) (61 - 81)  BP: 167/71 (25 Oct 2024 04:57) (161/81 - 190/85)  BP(mean): 96 (25 Oct 2024 04:57) (96 - 110)  RR: 21 (25 Oct 2024 04:57) (20 - 25)  SpO2: 97% (25 Oct 2024 04:57) (96% - 98%)    Parameters below as of 25 Oct 2024 04:57  Patient On (Oxygen Delivery Method): nasal cannula  O2 Flow (L/min): 4    Daily     Daily Weight in k (25 Oct 2024 05:01)    GENERAL:  Appears stated age,   HEENT:  NC/AT,    CHEST:  Full & symmetric excursion,   HEART:  Regular rhythm,  ABDOMEN:  Soft, non-tender, non-distended,  EXTEREMITIES:  no cyanosis  SKIN:  No rash  NEURO:  Alert,       LABS:                        12.5   7.77  )-----------( 148      ( 25 Oct 2024 05:45 )             38.1     10-25    144  |  108  |  43[H]  ----------------------------<  87  3.8   |  28  |  0.98    Ca    9.6      25 Oct 2024 05:45  Mg     1.9     10-25        Urinalysis Basic - ( 25 Oct 2024 05:45 )    Color: x / Appearance: x / SG: x / pH: x  Gluc: 87 mg/dL / Ketone: x  / Bili: x / Urobili: x   Blood: x / Protein: x / Nitrite: x   Leuk Esterase: x / RBC: x / WBC x   Sq Epi: x / Non Sq Epi: x / Bacteria: x        RADIOLOGY & ADDITIONAL TESTS:  
Patient is a 78y old  Male who presents with a chief complaint of Asp PNA (26 Oct 2024 15:08)     HPI: This is a 78 M with PMH of HLD GI motility issues who came in on 10/22 c/o that he keeps regurgitating everything he eats or drinks. Patient follows with a GI doctor. He has a h/o problems swallowing and was placed on a  dicyclomine to help with his dysphagia. Patient says he developed a rash. Patient saw a dermatologist and was prescribed prednisone. The dermatologist told him to stop the dicyclomine. He says his GI isunaware that pt stopped taking his dicyclomine. He denies SOB. In the ER he was found to have T 100.4 and SaO2 89% on RA.    No Facial asymmetry  No lateralized weakness.  No Fall LOC or seizure activity.  No Droopy eye lids. 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     Pt says that he has h/o Congential Cervical stenosis and became paraplegic sec to Cervical Myelopathy    Pt is retired .     Interval history -    In [facility]  Asleep.    MEDICATIONS  (STANDING):    atorvastatin 10 milliGRAM(s) Oral at bedtime  clonazePAM  Tablet 1.5 milliGRAM(s) Oral at bedtime  enoxaparin Injectable 40 milliGRAM(s) SubCutaneous every 24 hours  gabapentin 100 milliGRAM(s) Oral daily  hyoscyamine SL 0.25 milliGRAM(s) SubLingual three times a day  isosorbide   dinitrate Tablet (ISORDIL) 5 milliGRAM(s) Oral two times a day  lactobacillus acidophilus 1 Tablet(s) Oral daily  melatonin 5 milliGRAM(s) Oral at bedtime  pantoprazole    Tablet 40 milliGRAM(s) Oral two times a day  piperacillin/tazobactam IVPB.. 3.375 Gram(s) IV Intermittent every 8 hours  potassium chloride    Tablet ER 10 milliEquivalent(s) Oral three times a day  QUEtiapine 12.5 milliGRAM(s) Oral at bedtime  sucralfate 1 Gram(s) Oral four times a day    MEDICATIONS  (PRN):    albuterol    90 MICROgram(s) HFA Inhaler 2 Puff(s) Inhalation every 6 hours PRN Shortness of Breath and/or Wheezing  haloperidol    Injectable 1 milliGRAM(s) IntraMuscular every 6 hours PRN Agitation  ondansetron Injectable 4 milliGRAM(s) IV Push every 6 hours PRN Nausea and/or Vomiting    Allergies    No Known Allergies    REVIEW OF SYSTEMS:    CONSTITUTIONAL: No fever  EYES: No eye pain,   ENMT:  No sinus or throat pain  NECK: No pain or stiffness  RESPIRATORY: No cough, No hemoptysis; No shortness of breath  CARDIOVASCULAR: No acute chest pain, palpitations,  or leg swelling  GASTROINTESTINAL: No abdominal pain. 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 No medication allergy. No seasonal allergies.    PHYSICAL EXAM:    Vital Signs Last 24 Hrs    T(C): 36.7 (29 Oct 2024 16:03), Max: 37.3 (28 Oct 2024 20:27)  T(F): 98.1 (29 Oct 2024 16:03), Max: 99.2 (28 Oct 2024 20:27)  HR: 60 (29 Oct 2024 16:03) (60 - 64)  BP: 166/80 (29 Oct 2024 16:03) (137/64 - 167/66)  BP(mean): 89 (29 Oct 2024 07:51) (89 - 89)  RR: 18 (29 Oct 2024 16:03) (18 - 20)  SpO2: 94% (29 Oct 2024 16:03) (89% - 97%)    Parameters below as of 29 Oct 2024 16:03  Patient On (Oxygen Delivery Method): nasal cannula  O2 Flow (L/min): 2    GENERAL: NAD, well-groomed, well-developed  HEAD:  Atraumatic, Normocephalic  EYES: EOMI, PERRLA, conjunctiva and sclera clear  NECK: Supple, No JVD    On Neurological Examination:    Mental Status - Pt is alert, awake, oriented X3. Higher functions are intact.  Follows commands well and able to answer questions appropriately.    Speech -  Normal. Pt has no aphasia.    Cranial Nerves - Pupils 3 mm equal and reactive to light, extraocular eye movements intact. Pt has no visual field deficit.     Motor Exam - 3+/5 in B/L UE. LE  - Able to wiggles toes.      Deep tendon Reflexes - 2 plus all over.    Neck Supple -  Yes.    LABS:    CBC Full  -  ( 29 Oct 2024 06:00 )  WBC Count : 8.32 K/uL  RBC Count : 3.73 M/uL  Hemoglobin : 12.7 g/dL  Hematocrit : 37.1 %  Platelet Count - Automated : 155 K/uL  Mean Cell Volume : 99.5 fL  Mean Cell Hemoglobin : 34.0 pg  Mean Cell Hemoglobin Concentration : 34.2 g/dL    10-29    140  |  104  |  25[H]  ----------------------------<  102[H]  3.6   |  29  |  1.01    Ca    9.1      29 Oct 2024 06:00  Mg     2.0     10-29    RADIOLOGY & ADDITIONAL STUDIES:    < from: CT Chest No Cont (10.23.24 @ 10:11) >    Multilobar pneumonia with a distribution suggestive of aspiration.    Diffusely dilated fluid-filled esophagus.    Partially included fat stranding in the abdomen, most concentrated in the retroperitoneum. Consider pancreatitis; correlate with pancreatic enzymes.    < end of copied text >    < from: Xray Esophagram Single Contrast (10.23.24 @ 15:07) >    IMPRESSION:    Limited study demonstrates no peristalsis through the esophagus.  Suspected retained food within the esophagus, without evidence of obstruction as described above.    < end of copied text >    
Patient is a 78y old  Male who presents with a chief complaint of Asp PNA (26 Oct 2024 15:08)     HPI: This is a 78 M with PMH of HLD GI motility issues who came in on 10/22 c/o that he keeps regurgitating everything he eats or drinks. Patient follows with a GI doctor. He has a h/o problems swallowing and was placed on a  dicyclomine to help with his dysphagia. Patient says he developed a rash. Patient saw a dermatologist and was prescribed prednisone. The dermatologist told him to stop the dicyclomine. He says his GI isunaware that pt stopped taking his dicyclomine. He denies SOB. In the ER he was found to have T 100.4 and SaO2 89% on RA.    No Facial asymmetry  No lateralized weakness.  No Fall LOC or seizure activity.  No Droopy eye lids. 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     Pt says that he has h/o Congential Cervical stenosis and became paraplegic sec to Cervical Myelopathy    Pt is retired .     Interval history -    In [facility]  Asleep.  Son/daughter are at bedside.    MEDICATIONS  (STANDING):    atorvastatin 10 milliGRAM(s) Oral at bedtime  clonazePAM  Tablet 1.5 milliGRAM(s) Oral at bedtime  enoxaparin Injectable 40 milliGRAM(s) SubCutaneous every 24 hours  gabapentin 100 milliGRAM(s) Oral daily  hyoscyamine SL 0.25 milliGRAM(s) SubLingual three times a day  isosorbide   dinitrate Tablet (ISORDIL) 5 milliGRAM(s) Oral two times a day  lactobacillus acidophilus 1 Tablet(s) Oral daily  melatonin 5 milliGRAM(s) Oral at bedtime  pantoprazole    Tablet 40 milliGRAM(s) Oral two times a day  piperacillin/tazobactam IVPB.. 3.375 Gram(s) IV Intermittent every 8 hours  potassium chloride    Tablet ER 10 milliEquivalent(s) Oral three times a day  QUEtiapine 12.5 milliGRAM(s) Oral at bedtime  sucralfate 1 Gram(s) Oral four times a day    MEDICATIONS  (PRN):    albuterol    90 MICROgram(s) HFA Inhaler 2 Puff(s) Inhalation every 6 hours PRN Shortness of Breath and/or Wheezing  haloperidol    Injectable 1 milliGRAM(s) IntraMuscular every 6 hours PRN Agitation  ondansetron Injectable 4 milliGRAM(s) IV Push every 6 hours PRN Nausea and/or Vomiting    Allergies    No Known Allergies    REVIEW OF SYSTEMS:    CONSTITUTIONAL: No fever  EYES: No eye pain,   ENMT:  No sinus or throat pain  NECK: No pain or stiffness  RESPIRATORY: No cough, No hemoptysis; No shortness of breath  CARDIOVASCULAR: No acute chest pain, palpitations,  or leg swelling  GASTROINTESTINAL: No abdominal pain. 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 No medication allergy. No seasonal allergies.    PHYSICAL EXAM:    Vital Signs Last 24 Hrs    T(C): 36.4 (28 Oct 2024 11:50), Max: 36.8 (27 Oct 2024 20:58)  T(F): 97.5 (28 Oct 2024 11:50), Max: 98.3 (28 Oct 2024 07:41)  HR: 61 (28 Oct 2024 11:50) (60 - 65)  BP: 151/68 (28 Oct 2024 11:50) (151/63 - 156/66)  BP(mean): 96 (28 Oct 2024 07:41) (92 - 96)  RR: 17 (28 Oct 2024 11:50) (17 - 20)  SpO2: 91% (28 Oct 2024 11:50) (91% - 95%)    Parameters below as of 28 Oct 2024 11:50  Patient On (Oxygen Delivery Method): nasal cannula    GENERAL: NAD, well-groomed, well-developed  HEAD:  Atraumatic, Normocephalic  EYES: EOMI, PERRLA, conjunctiva and sclera clear  NECK: Supple, No JVD    On Neurological Examination:    Mental Status - Pt is alert, awake, oriented X3. Higher functions are intact.  Follows commands well and able to answer questions appropriately.    Speech -  Normal. Pt has no aphasia.    Cranial Nerves - Pupils 3 mm equal and reactive to light, extraocular eye movements intact. Pt has no visual field deficit.     Motor Exam - 3+/5 in B/L UE. LE  - Able to wiggles toes.      Deep tendon Reflexes - 2 plus all over.    Neck Supple -  Yes.    LABS:             CBC Full  -  ( 28 Oct 2024 06:00 )  WBC Count : 8.99 K/uL  RBC Count : 3.63 M/uL  Hemoglobin : 12.3 g/dL  Hematocrit : 36.9 %  Platelet Count - Automated : 146 K/uL  Mean Cell Volume : 101.7 fL  Mean Cell Hemoglobin : 33.9 pg  Mean Cell Hemoglobin Concentration : 33.3 g/dL    10-28    141  |  106  |  30[H]  ----------------------------<  92  3.7   |  31  |  0.93    Ca    9.0      28 Oct 2024 06:00    TPro  5.3[L]  /  Alb  2.4[L]  /  TBili  1.2  /  DBili  x   /  AST  13  /  ALT  20  /  AlkPhos  57  10-28    RADIOLOGY & ADDITIONAL STUDIES:    < from: CT Chest No Cont (10.23.24 @ 10:11) >    Multilobar pneumonia with a distribution suggestive of aspiration.    Diffusely dilated fluid-filled esophagus.    Partially included fat stranding in the abdomen, most concentrated in the retroperitoneum. Consider pancreatitis; correlate with pancreatic enzymes.    < end of copied text >    < from: Xray Esophagram Single Contrast (10.23.24 @ 15:07) >    IMPRESSION:    Limited study demonstrates no peristalsis through the esophagus.  Suspected retained food within the esophagus, without evidence of obstruction as described above.    < end of copied text >    
Date/Time Patient Seen:  		  Referring MD:   Data Reviewed	       Patient is a 78y old  Male who presents with a chief complaint of Asp PNA (27 Oct 2024 18:00)      Subjective/HPI     PAST MEDICAL & SURGICAL HISTORY:  No pertinent past medical history    Dysphagia          Medication list         MEDICATIONS  (STANDING):  atorvastatin 10 milliGRAM(s) Oral at bedtime  clonazePAM  Tablet 1.5 milliGRAM(s) Oral at bedtime  enoxaparin Injectable 40 milliGRAM(s) SubCutaneous every 24 hours  gabapentin 100 milliGRAM(s) Oral daily  hyoscyamine SL 0.25 milliGRAM(s) SubLingual three times a day  isosorbide   dinitrate Tablet (ISORDIL) 5 milliGRAM(s) Oral two times a day  lactobacillus acidophilus 1 Tablet(s) Oral daily  LORazepam   Injectable 2 milliGRAM(s) IV Push once  melatonin 5 milliGRAM(s) Oral at bedtime  pantoprazole    Tablet 40 milliGRAM(s) Oral two times a day  piperacillin/tazobactam IVPB.. 3.375 Gram(s) IV Intermittent every 8 hours  potassium chloride   Powder 20 milliEquivalent(s) Oral daily  QUEtiapine 25 milliGRAM(s) Oral at bedtime  sucralfate 1 Gram(s) Oral four times a day    MEDICATIONS  (PRN):  albuterol    90 MICROgram(s) HFA Inhaler 2 Puff(s) Inhalation every 6 hours PRN Shortness of Breath and/or Wheezing  haloperidol    Injectable 1 milliGRAM(s) IntraMuscular every 6 hours PRN Agitation  ondansetron Injectable 4 milliGRAM(s) IV Push every 6 hours PRN Nausea and/or Vomiting         Vitals log        ICU Vital Signs Last 24 Hrs  T(C): 36.7 (28 Oct 2024 00:44), Max: 36.8 (27 Oct 2024 20:58)  T(F): 98.1 (28 Oct 2024 00:44), Max: 98.2 (27 Oct 2024 20:58)  HR: 60 (28 Oct 2024 00:44) (60 - 60)  BP: 156/66 (28 Oct 2024 00:44) (134/51 - 156/66)  BP(mean): 93 (28 Oct 2024 00:44) (73 - 94)  ABP: --  ABP(mean): --  RR: 18 (28 Oct 2024 00:44) (18 - 21)  SpO2: 93% (28 Oct 2024 00:44) (88% - 95%)    O2 Parameters below as of 28 Oct 2024 00:44  Patient On (Oxygen Delivery Method): nasal cannula                 Input and Output:  I&O's Detail    26 Oct 2024 07:01  -  27 Oct 2024 07:00  --------------------------------------------------------  IN:    IV PiggyBack: 200 mL  Total IN: 200 mL    OUT:  Total OUT: 0 mL    Total NET: 200 mL          Lab Data    10-27    141  |  107  |  40[H]  ----------------------------<  131[H]  3.3[L]   |  29  |  0.91    Ca    9.1      27 Oct 2024 05:30              Review of Systems	      Objective     Physical Examination    heart s1s2  lung dc BS  head nc  head at      Pertinent Lab findings & Imaging      Howard:  NO   Adequate UO     I&O's Detail    26 Oct 2024 07:01  -  27 Oct 2024 07:00  --------------------------------------------------------  IN:    IV PiggyBack: 200 mL  Total IN: 200 mL    OUT:  Total OUT: 0 mL    Total NET: 200 mL               Discussed with:     Cultures:	        Radiology                            
Date/Time Patient Seen:  		  Referring MD:   Data Reviewed	       Patient is a 78y old  Male who presents with a chief complaint of Asp PNA (28 Oct 2024 16:34)      Subjective/HPI     PAST MEDICAL & SURGICAL HISTORY:  No pertinent past medical history    Dysphagia          Medication list         MEDICATIONS  (STANDING):  aspirin  chewable 81 milliGRAM(s) Oral daily  atorvastatin 10 milliGRAM(s) Oral at bedtime  clonazePAM  Tablet 1.5 milliGRAM(s) Oral at bedtime  enoxaparin Injectable 40 milliGRAM(s) SubCutaneous every 24 hours  gabapentin 100 milliGRAM(s) Oral daily  hyoscyamine SL 0.25 milliGRAM(s) SubLingual three times a day  isosorbide   dinitrate Tablet (ISORDIL) 5 milliGRAM(s) Oral three times a day  lactobacillus acidophilus 1 Tablet(s) Oral daily  LORazepam   Injectable 2 milliGRAM(s) IV Push once  melatonin 5 milliGRAM(s) Oral at bedtime  pantoprazole    Tablet 40 milliGRAM(s) Oral two times a day  piperacillin/tazobactam IVPB.. 3.375 Gram(s) IV Intermittent every 8 hours  potassium chloride   Powder 20 milliEquivalent(s) Oral daily  QUEtiapine 25 milliGRAM(s) Oral at bedtime  sucralfate 1 Gram(s) Oral four times a day    MEDICATIONS  (PRN):  acetaminophen     Tablet .. 650 milliGRAM(s) Oral every 6 hours PRN Mild Pain (1 - 3)  albuterol    90 MICROgram(s) HFA Inhaler 2 Puff(s) Inhalation every 6 hours PRN Shortness of Breath and/or Wheezing  haloperidol    Injectable 1 milliGRAM(s) IntraMuscular every 6 hours PRN Agitation  ondansetron Injectable 4 milliGRAM(s) IV Push every 6 hours PRN Nausea and/or Vomiting         Vitals log        ICU Vital Signs Last 24 Hrs  T(C): 36.3 (29 Oct 2024 00:01), Max: 37.3 (28 Oct 2024 20:27)  T(F): 97.4 (29 Oct 2024 00:01), Max: 99.2 (28 Oct 2024 20:27)  HR: 63 (28 Oct 2024 20:27) (61 - 65)  BP: 155/66 (28 Oct 2024 20:27) (147/70 - 155/66)  BP(mean): 96 (28 Oct 2024 07:41) (96 - 96)  ABP: --  ABP(mean): --  RR: 18 (28 Oct 2024 20:27) (17 - 18)  SpO2: 91% (28 Oct 2024 20:27) (91% - 94%)    O2 Parameters below as of 28 Oct 2024 20:27  Patient On (Oxygen Delivery Method): nasal cannula  O2 Flow (L/min): 2               Input and Output:  I&O's Detail    28 Oct 2024 07:01  -  29 Oct 2024 05:28  --------------------------------------------------------  IN:    IV PiggyBack: 100 mL    Oral Fluid: 100 mL  Total IN: 200 mL    OUT:  Total OUT: 0 mL    Total NET: 200 mL          Lab Data                        12.3   8.99  )-----------( 146      ( 28 Oct 2024 06:00 )             36.9     10-28    141  |  106  |  30[H]  ----------------------------<  92  3.7   |  31  |  0.93    Ca    9.0      28 Oct 2024 06:00    TPro  5.3[L]  /  Alb  2.4[L]  /  TBili  1.2  /  DBili  x   /  AST  13  /  ALT  20  /  AlkPhos  57  10-28            Review of Systems	      Objective     Physical Examination    heart s1s2  lung dc BS  head nc  head at      Pertinent Lab findings & Imaging      Howard:  NO   Adequate UO     I&O's Detail    28 Oct 2024 07:01  -  29 Oct 2024 05:28  --------------------------------------------------------  IN:    IV PiggyBack: 100 mL    Oral Fluid: 100 mL  Total IN: 200 mL    OUT:  Total OUT: 0 mL    Total NET: 200 mL               Discussed with:     Cultures:	        Radiology                            
[facility]  Napoleon Gill PA-C  [address] [id number] [phone]      INTERVAL HPI/OVERNIGHT EVENTS: pending swallow evaluation     MEDICATIONS  (STANDING):  enoxaparin Injectable 40 milliGRAM(s) SubCutaneous every 24 hours  hyoscyamine SL 0.25 milliGRAM(s) SubLingual three times a day  metoprolol tartrate Injectable 5 milliGRAM(s) IV Push every 8 hours  pantoprazole  Injectable 40 milliGRAM(s) IV Push two times a day  piperacillin/tazobactam IVPB.. 3.375 Gram(s) IV Intermittent every 8 hours  sodium chloride 0.9%. 1000 milliLiter(s) (50 mL/Hr) IV Continuous <Continuous>    MEDICATIONS  (PRN):  ondansetron Injectable 4 milliGRAM(s) IV Push every 6 hours PRN Nausea and/or Vomiting      Allergies    No Known Allergies    Intolerances        ROS:   General:  No  fevers, chills, night sweats, fatigue,   Eyes:  Good vision, no reported pain  ENT:  No sore throat, pain, runny nose, dysphagia  CV:  No pain, palpitations, hypo/hypertension  Resp:  No dyspnea, cough, tachypnea, wheezing  GI:  No pain, No nausea, No vomiting, No diarrhea, No constipation, No weight loss, No fever, No pruritis, No rectal bleeding, No tarry stools, No dysphagia,  :  No pain, bleeding, incontinence, nocturia  Muscle:  No pain, weakness  Neuro:  No weakness, tingling, memory problems  Psych:  No fatigue, insomnia, mood problems, depression  Endocrine:  No polyuria, polydipsia, cold/heat intolerance  Heme:  No petechiae, ecchymosis, easy bruisability  Skin:  No rash, tattoos, scars, edema      PHYSICAL EXAM:   Vital Signs:  Vital Signs Last 24 Hrs  T(C): 36.8 (25 Oct 2024 07:45), Max: 37.1 (24 Oct 2024 15:37)  T(F): 98.3 (25 Oct 2024 07:45), Max: 98.8 (24 Oct 2024 22:34)  HR: 61 (25 Oct 2024 04:57) (61 - 81)  BP: 167/71 (25 Oct 2024 04:57) (161/81 - 190/85)  BP(mean): 96 (25 Oct 2024 04:57) (96 - 110)  RR: 21 (25 Oct 2024 04:57) (20 - 25)  SpO2: 97% (25 Oct 2024 04:57) (96% - 98%)    Parameters below as of 25 Oct 2024 04:57  Patient On (Oxygen Delivery Method): nasal cannula  O2 Flow (L/min): 4    Daily     Daily Weight in k (25 Oct 2024 05:01)    GENERAL:  Appears stated age,   HEENT:  NC/AT,    CHEST:  Full & symmetric excursion,   HEART:  Regular rhythm,  ABDOMEN:  Soft, non-tender, non-distended,  EXTEREMITIES:  no cyanosis  SKIN:  No rash  NEURO:  Alert,       LABS:                        12.5   7.77  )-----------( 148      ( 25 Oct 2024 05:45 )             38.1     10-25    144  |  108  |  43[H]  ----------------------------<  87  3.8   |  28  |  0.98    Ca    9.6      25 Oct 2024 05:45  Mg     1.9     10-25        Urinalysis Basic - ( 25 Oct 2024 05:45 )    Color: x / Appearance: x / SG: x / pH: x  Gluc: 87 mg/dL / Ketone: x  / Bili: x / Urobili: x   Blood: x / Protein: x / Nitrite: x   Leuk Esterase: x / RBC: x / WBC x   Sq Epi: x / Non Sq Epi: x / Bacteria: x        RADIOLOGY & ADDITIONAL TESTS:  
[facility]  Napoleon Gill PA-C  [address] [place] [address]  [phone]      INTERVAL HPI/OVERNIGHT EVENTS: Esophagram noted, no visual obstruction      MEDICATIONS  (STANDING):  enoxaparin Injectable 40 milliGRAM(s) SubCutaneous every 24 hours  pantoprazole  Injectable 40 milliGRAM(s) IV Push two times a day  piperacillin/tazobactam IVPB.. 3.375 Gram(s) IV Intermittent every 8 hours  potassium chloride  10 mEq/100 mL IVPB 10 milliEquivalent(s) IV Intermittent every 1 hour  sodium chloride 0.9%. 1000 milliLiter(s) (100 mL/Hr) IV Continuous <Continuous>    MEDICATIONS  (PRN):  ondansetron Injectable 4 milliGRAM(s) IV Push every 6 hours PRN Nausea and/or Vomiting      Allergies    No Known Allergies    Intolerances        ROS:   General:  No  fevers, chills, night sweats, fatigue,   Eyes:  Good vision, no reported pain  ENT:  No sore throat, pain, runny nose, dysphagia  CV:  No pain, palpitations, hypo/hypertension  Resp:  No dyspnea, cough, tachypnea, wheezing  GI:  No pain, No nausea, No vomiting, No diarrhea, No constipation, No weight loss, No fever, No pruritis, No rectal bleeding, No tarry stools, No dysphagia,  :  No pain, bleeding, incontinence, nocturia  Muscle:  No pain, weakness  Neuro:  No weakness, tingling, memory problems  Psych:  No fatigue, insomnia, mood problems, depression  Endocrine:  No polyuria, polydipsia, cold/heat intolerance  Heme:  No petechiae, ecchymosis, easy bruisability  Skin:  No rash, tattoos, scars, edema      PHYSICAL EXAM:   Vital Signs:  Vital Signs Last 24 Hrs  T(C): 36.8 (24 Oct 2024 07:39), Max: 37.7 (23 Oct 2024 21:11)  T(F): 98.2 (24 Oct 2024 07:39), Max: 99.8 (23 Oct 2024 21:11)  HR: 75 (24 Oct 2024 04:00) (71 - 79)  BP: 48/74 (24 Oct 2024 04:00) (48/74 - 161/75)  BP(mean): 93 (24 Oct 2024 04:00) (87 - 95)  RR: 30 (24 Oct 2024 04:00) (19 - 30)  SpO2: 97% (24 Oct 2024 04:00) (84% - 97%)    Parameters below as of 24 Oct 2024 04:00  Patient On (Oxygen Delivery Method): nasal cannula  O2 Flow (L/min): 4    Daily     Daily Weight in k (24 Oct 2024 04:00)    GENERAL:  Appears stated age,   HEENT:  NC/AT,    CHEST:  Full & symmetric excursion,   HEART:  Regular rhythm,  ABDOMEN:  Soft, non-tender, non-distended,  EXTEREMITIES:  no cyanosis  SKIN:  No rash  NEURO:  Alert,       LABS:                        12.0   5.41  )-----------( 126      ( 24 Oct 2024 06:00 )             36.3     10-24    143  |  106  |  39[H]  ----------------------------<  94  3.4[L]   |  28  |  1.03    Ca    9.4      24 Oct 2024 06:00  Mg     2.0     10-24    TPro  6.3  /  Alb  3.2[L]  /  TBili  2.6[H]  /  DBili  x   /  AST  22  /  ALT  17  /  AlkPhos  71  10-23    PT/INR - ( 22 Oct 2024 22:09 )   PT: 15.0 sec;   INR: 1.30 ratio         PTT - ( 22 Oct 2024 22:09 )  PTT:29.3 sec  Urinalysis Basic - ( 24 Oct 2024 06:00 )    Color: x / Appearance: x / SG: x / pH: x  Gluc: 94 mg/dL / Ketone: x  / Bili: x / Urobili: x   Blood: x / Protein: x / Nitrite: x   Leuk Esterase: x / RBC: x / WBC x   Sq Epi: x / Non Sq Epi: x / Bacteria: x        RADIOLOGY & ADDITIONAL TESTS:  
Date of Service: 10-28-24 @ 11:25    Patient is a 78y old  Male who presents with a chief complaint of Asp PNA (28 Oct 2024 08:41)      INTERVAL HPI/OVERNIGHT EVENTS: Patient seen and examined. NAD. No complaints.    Vital Signs Last 24 Hrs  T(C): 36.8 (28 Oct 2024 07:41), Max: 36.8 (27 Oct 2024 20:58)  T(F): 98.3 (28 Oct 2024 07:41), Max: 98.3 (28 Oct 2024 07:41)  HR: 65 (28 Oct 2024 07:41) (60 - 65)  BP: 154/67 (28 Oct 2024 07:41) (134/51 - 156/66)  BP(mean): 96 (28 Oct 2024 07:41) (73 - 96)  RR: 18 (28 Oct 2024 07:41) (18 - 21)  SpO2: 94% (28 Oct 2024 07:41) (88% - 95%)    Parameters below as of 28 Oct 2024 10:42  Patient On (Oxygen Delivery Method): nasal cannula  O2 Flow (L/min): 2      10-28    141  |  106  |  30[H]  ----------------------------<  92  3.7   |  31  |  0.93    Ca    9.0      28 Oct 2024 06:00    TPro  5.3[L]  /  Alb  2.4[L]  /  TBili  1.2  /  DBili  x   /  AST  13  /  ALT  20  /  AlkPhos  57  10-28                          12.3   8.99  )-----------( 146      ( 28 Oct 2024 06:00 )             36.9       CAPILLARY BLOOD GLUCOSE        Urinalysis Basic - ( 28 Oct 2024 06:00 )    Color: x / Appearance: x / SG: x / pH: x  Gluc: 92 mg/dL / Ketone: x  / Bili: x / Urobili: x   Blood: x / Protein: x / Nitrite: x   Leuk Esterase: x / RBC: x / WBC x   Sq Epi: x / Non Sq Epi: x / Bacteria: x              acetaminophen     Tablet .. 650 milliGRAM(s) Oral every 6 hours PRN  albuterol    90 MICROgram(s) HFA Inhaler 2 Puff(s) Inhalation every 6 hours PRN  aspirin  chewable 81 milliGRAM(s) Oral daily  atorvastatin 10 milliGRAM(s) Oral at bedtime  clonazePAM  Tablet 1.5 milliGRAM(s) Oral at bedtime  enoxaparin Injectable 40 milliGRAM(s) SubCutaneous every 24 hours  gabapentin 100 milliGRAM(s) Oral daily  haloperidol    Injectable 1 milliGRAM(s) IntraMuscular every 6 hours PRN  hyoscyamine SL 0.25 milliGRAM(s) SubLingual three times a day  isosorbide   dinitrate Tablet (ISORDIL) 5 milliGRAM(s) Oral three times a day  lactobacillus acidophilus 1 Tablet(s) Oral daily  LORazepam   Injectable 2 milliGRAM(s) IV Push once  melatonin 5 milliGRAM(s) Oral at bedtime  ondansetron Injectable 4 milliGRAM(s) IV Push every 6 hours PRN  pantoprazole    Tablet 40 milliGRAM(s) Oral two times a day  piperacillin/tazobactam IVPB.. 3.375 Gram(s) IV Intermittent every 8 hours  potassium chloride   Powder 20 milliEquivalent(s) Oral daily  QUEtiapine 25 milliGRAM(s) Oral at bedtime  sucralfate 1 Gram(s) Oral four times a day              REVIEW OF SYSTEMS:  CONSTITUTIONAL: No fever, no weight loss, or no fatigue  NECK: No pain, no stiffness  RESPIRATORY: No cough, no wheezing, no chills, no hemoptysis, No shortness of breath  CARDIOVASCULAR: No chest pain, no palpitations, no dizziness, no leg swelling  GASTROINTESTINAL: No abdominal pain. No nausea, no vomiting, no hematemesis; No diarrhea, no constipation. No melena, no hematochezia.  GENITOURINARY: No dysuria, no frequency, no hematuria, no incontinence  NEUROLOGICAL: No headaches, no loss of strength, no numbness, no tremors  SKIN: No itching, no burning  MUSCULOSKELETAL: No joint pain, no swelling; No muscle, no back, no extremity pain  PSYCHIATRIC: No depression, no mood swings,   HEME/LYMPH: No easy bruising, no bleeding gums  ALLERY AND IMMUNOLOGIC: No hives       Consultant(s) Notes Reviewed:  [X] YES  [ ] NO    PHYSICAL EXAM:  GENERAL: NAD  HEAD:  Atraumatic, Normocephalic  EYES: EOMI, PERRLA, conjunctiva and sclera clear  ENMT: No tonsillar erythema, exudates, or enlargement; Moist mucous membranes  NECK: Supple, No JVD  NERVOUS SYSTEM:  Awake & alert  CHEST/LUNG: Clear to auscultation bilaterally; No rales, rhonchi, wheezing,  HEART: Regular rate and rhythm  ABDOMEN: Soft, Nontender, Nondistended; Bowel sounds present  EXTREMITIES:  No clubbing, cyanosis, or edema  LYMPH: No lymphadenopathy noted  SKIN: No rashes      Advanced care planning discussed with patient/family [X] YES   [ ] NO    Advanced care planning discussed with patient/family. Patient's health status was discussed. All appropriate changes have been made regarding patient's end-of-life care. Advanced care planning forms reviewed/discussed/completed.  20 minutes spent.   
Date of Service: 10-29-24 @ 11:34    Patient is a 78y old  Male who presents with a chief complaint of Asp PNA (29 Oct 2024 08:39)      INTERVAL HPI/OVERNIGHT EVENTS: Patient seen and examined. NAD. No complaints.    Vital Signs Last 24 Hrs  T(C): 36.9 (29 Oct 2024 07:51), Max: 37.3 (28 Oct 2024 20:27)  T(F): 98.4 (29 Oct 2024 07:51), Max: 99.2 (28 Oct 2024 20:27)  HR: 60 (29 Oct 2024 07:51) (60 - 64)  BP: 137/64 (29 Oct 2024 07:51) (137/64 - 167/66)  BP(mean): 89 (29 Oct 2024 07:51) (89 - 89)  RR: 20 (29 Oct 2024 07:51) (17 - 20)  SpO2: 89% (29 Oct 2024 07:51) (89% - 97%)    Parameters below as of 29 Oct 2024 07:51  Patient On (Oxygen Delivery Method): nasal cannula        10-29    140  |  104  |  25[H]  ----------------------------<  102[H]  3.6   |  29  |  1.01    Ca    9.1      29 Oct 2024 06:00  Mg     2.0     10-29    TPro  5.3[L]  /  Alb  2.4[L]  /  TBili  1.2  /  DBili  x   /  AST  13  /  ALT  20  /  AlkPhos  57  10-28                          12.7   8.32  )-----------( 155      ( 29 Oct 2024 06:00 )             37.1       CAPILLARY BLOOD GLUCOSE        Urinalysis Basic - ( 29 Oct 2024 06:00 )    Color: x / Appearance: x / SG: x / pH: x  Gluc: 102 mg/dL / Ketone: x  / Bili: x / Urobili: x   Blood: x / Protein: x / Nitrite: x   Leuk Esterase: x / RBC: x / WBC x   Sq Epi: x / Non Sq Epi: x / Bacteria: x              acetaminophen     Tablet .. 650 milliGRAM(s) Oral every 6 hours PRN  albuterol    90 MICROgram(s) HFA Inhaler 2 Puff(s) Inhalation every 6 hours PRN  aspirin  chewable 81 milliGRAM(s) Oral daily  atorvastatin 10 milliGRAM(s) Oral at bedtime  clonazePAM  Tablet 1.5 milliGRAM(s) Oral at bedtime  enoxaparin Injectable 40 milliGRAM(s) SubCutaneous every 24 hours  gabapentin 100 milliGRAM(s) Oral daily  haloperidol    Injectable 1 milliGRAM(s) IntraMuscular every 6 hours PRN  hyoscyamine SL 0.25 milliGRAM(s) SubLingual three times a day  isosorbide   dinitrate Tablet (ISORDIL) 5 milliGRAM(s) Oral three times a day  lactobacillus acidophilus 1 Tablet(s) Oral daily  melatonin 5 milliGRAM(s) Oral at bedtime  ondansetron Injectable 4 milliGRAM(s) IV Push every 6 hours PRN  pantoprazole    Tablet 40 milliGRAM(s) Oral two times a day  piperacillin/tazobactam IVPB.. 3.375 Gram(s) IV Intermittent every 8 hours  potassium chloride   Powder 20 milliEquivalent(s) Oral daily  QUEtiapine 25 milliGRAM(s) Oral at bedtime  sucralfate 1 Gram(s) Oral four times a day              REVIEW OF SYSTEMS:  CONSTITUTIONAL: No fever, no weight loss, or no fatigue  NECK: No pain, no stiffness  RESPIRATORY: No cough, no wheezing, no chills, no hemoptysis, No shortness of breath  CARDIOVASCULAR: No chest pain, no palpitations, no dizziness, no leg swelling  GASTROINTESTINAL: No abdominal pain. No nausea, no vomiting, no hematemesis; No diarrhea, no constipation. No melena, no hematochezia.  GENITOURINARY: No dysuria, no frequency, no hematuria, no incontinence  NEUROLOGICAL: No headaches, no loss of strength, no numbness, no tremors  SKIN: No itching, no burning  MUSCULOSKELETAL: No joint pain, no swelling; No muscle, no back, no extremity pain  PSYCHIATRIC: No depression, no mood swings,   HEME/LYMPH: No easy bruising, no bleeding gums  ALLERY AND IMMUNOLOGIC: No hives       Consultant(s) Notes Reviewed:  [X] YES  [ ] NO    PHYSICAL EXAM:  GENERAL: NAD  HEAD:  Atraumatic, Normocephalic  EYES: EOMI, PERRLA, conjunctiva and sclera clear  ENMT: No tonsillar erythema, exudates, or enlargement; Moist mucous membranes  NECK: Supple, No JVD  NERVOUS SYSTEM:  Awake & alert  CHEST/LUNG: Clear to auscultation bilaterally; No rales, rhonchi, wheezing,  HEART: Regular rate and rhythm  ABDOMEN: Soft, Nontender, Nondistended; Bowel sounds present  EXTREMITIES:  No clubbing, cyanosis, or edema  LYMPH: No lymphadenopathy noted  SKIN: No rashes      Advanced care planning discussed with patient/family [X] YES   [ ] NO    Advanced care planning discussed with patient/family. Patient's health status was discussed. All appropriate changes have been made regarding patient's end-of-life care. Advanced care planning forms reviewed/discussed/completed.  20 minutes spent.   
Date of Service: 10-25-24 @ 08:13    Patient is a 78y old  Male who presents with a chief complaint of Asp PNA (25 Oct 2024 05:42)      INTERVAL HPI/OVERNIGHT EVENTS: Patient seen and examined. NAD. No complaints.    Vital Signs Last 24 Hrs  T(C): 36.8 (25 Oct 2024 07:45), Max: 37.1 (24 Oct 2024 15:37)  T(F): 98.3 (25 Oct 2024 07:45), Max: 98.8 (24 Oct 2024 22:34)  HR: 61 (25 Oct 2024 04:57) (61 - 81)  BP: 167/71 (25 Oct 2024 04:57) (161/81 - 190/85)  BP(mean): 96 (25 Oct 2024 04:57) (96 - 110)  RR: 21 (25 Oct 2024 04:57) (20 - 25)  SpO2: 97% (25 Oct 2024 04:57) (96% - 98%)    Parameters below as of 25 Oct 2024 04:57  Patient On (Oxygen Delivery Method): nasal cannula  O2 Flow (L/min): 4      10-25    144  |  108  |  43[H]  ----------------------------<  87  3.8   |  28  |  0.98    Ca    9.6      25 Oct 2024 05:45  Mg     1.9     10-25                            12.5   7.77  )-----------( 148      ( 25 Oct 2024 05:45 )             38.1       CAPILLARY BLOOD GLUCOSE        Urinalysis Basic - ( 25 Oct 2024 05:45 )    Color: x / Appearance: x / SG: x / pH: x  Gluc: 87 mg/dL / Ketone: x  / Bili: x / Urobili: x   Blood: x / Protein: x / Nitrite: x   Leuk Esterase: x / RBC: x / WBC x   Sq Epi: x / Non Sq Epi: x / Bacteria: x              enoxaparin Injectable 40 milliGRAM(s) SubCutaneous every 24 hours  hyoscyamine SL 0.25 milliGRAM(s) SubLingual three times a day  ondansetron Injectable 4 milliGRAM(s) IV Push every 6 hours PRN  pantoprazole  Injectable 40 milliGRAM(s) IV Push two times a day  piperacillin/tazobactam IVPB.. 3.375 Gram(s) IV Intermittent every 8 hours  sodium chloride 0.9%. 1000 milliLiter(s) IV Continuous <Continuous>              REVIEW OF SYSTEMS:  CONSTITUTIONAL: No fever, no weight loss, or no fatigue  NECK: No pain, no stiffness  RESPIRATORY: No cough, no wheezing, no chills, no hemoptysis, No shortness of breath  CARDIOVASCULAR: No chest pain, no palpitations, no dizziness, no leg swelling  GASTROINTESTINAL: No abdominal pain. No nausea, no vomiting, no hematemesis; No diarrhea, no constipation. No melena, no hematochezia.  GENITOURINARY: No dysuria, no frequency, no hematuria, no incontinence  NEUROLOGICAL: No headaches, no loss of strength, no numbness, no tremors  SKIN: No itching, no burning  MUSCULOSKELETAL: No joint pain, no swelling; No muscle, no back, no extremity pain  PSYCHIATRIC: No depression, no mood swings,   HEME/LYMPH: No easy bruising, no bleeding gums  ALLERY AND IMMUNOLOGIC: No hives       Consultant(s) Notes Reviewed:  [X] YES  [ ] NO    PHYSICAL EXAM:  GENERAL: NAD  HEAD:  Atraumatic, Normocephalic  EYES: EOMI, PERRLA, conjunctiva and sclera clear  ENMT: No tonsillar erythema, exudates, or enlargement; Moist mucous membranes  NECK: Supple, No JVD  NERVOUS SYSTEM:  Awake & alert  CHEST/LUNG: Clear to auscultation bilaterally; No rales, rhonchi, wheezing,  HEART: Regular rate and rhythm  ABDOMEN: Soft, Nontender, Nondistended; Bowel sounds present  EXTREMITIES:  No clubbing, cyanosis, or edema  LYMPH: No lymphadenopathy noted  SKIN: No rashes      Advanced care planning discussed with patient/family [X] YES   [ ] NO    Advanced care planning discussed with patient/family. Patient's health status was discussed. All appropriate changes have been made regarding patient's end-of-life care. Advanced care planning forms reviewed/discussed/completed.  20 minutes spent.   
Date of Service 10-27-24 @ 16:55    Patient is a 78y old  Male who presents with a chief complaint of Asp PNA (27 Oct 2024 09:09)      INTERVAL /OVERNIGHT EVENTS: confused ovenight     MEDICATIONS  (STANDING):  atorvastatin 10 milliGRAM(s) Oral at bedtime  clonazePAM  Tablet 1.5 milliGRAM(s) Oral at bedtime  enoxaparin Injectable 40 milliGRAM(s) SubCutaneous every 24 hours  gabapentin 100 milliGRAM(s) Oral daily  hyoscyamine SL 0.25 milliGRAM(s) SubLingual three times a day  isosorbide   dinitrate Tablet (ISORDIL) 5 milliGRAM(s) Oral two times a day  lactobacillus acidophilus 1 Tablet(s) Oral daily  melatonin 5 milliGRAM(s) Oral at bedtime  pantoprazole    Tablet 40 milliGRAM(s) Oral two times a day  piperacillin/tazobactam IVPB.. 3.375 Gram(s) IV Intermittent every 8 hours  potassium chloride   Powder 20 milliEquivalent(s) Oral daily  QUEtiapine 25 milliGRAM(s) Oral at bedtime  sucralfate 1 Gram(s) Oral four times a day    MEDICATIONS  (PRN):  albuterol    90 MICROgram(s) HFA Inhaler 2 Puff(s) Inhalation every 6 hours PRN Shortness of Breath and/or Wheezing  haloperidol    Injectable 1 milliGRAM(s) IntraMuscular every 6 hours PRN Agitation  ondansetron Injectable 4 milliGRAM(s) IV Push every 6 hours PRN Nausea and/or Vomiting      Allergies    No Known Allergies    Intolerances        REVIEW OF SYSTEMS:  CONSTITUTIONAL: No fever, weight loss, or fatigue  EYES: No eye pain, visual disturbances, or discharge  ENMT:  No difficulty hearing, tinnitus, vertigo; No sinus or throat pain  NECK: No pain or stiffness  RESPIRATORY: No cough, wheezing, chills or hemoptysis; No shortness of breath  CARDIOVASCULAR: No chest pain, palpitations, dizziness, or leg swelling  GASTROINTESTINAL: No abdominal or epigastric pain. No nausea, vomiting, or hematemesis; No diarrhea or constipation. No melena or hematochezia.  GENITOURINARY: No dysuria, frequency, hematuria, or incontinence  NEUROLOGICAL: No headaches, memory loss, loss of strength, numbness, or tremors  SKIN: No itching, burning, rashes, or lesions   LYMPH NODES: No enlarged glands  ENDOCRINE: No heat or cold intolerance; No hair loss; No polydipsia or polyuria  MUSCULOSKELETAL: No joint pain or swelling; No muscle, back, or extremity pain  PSYCHIATRIC: No depression, anxiety, mood swings, or difficulty sleeping  HEME/LYMPH: No easy bruising, or bleeding gums  ALLERGY AND IMMUNOLOGIC: No hives or eczema    Vital Signs Last 24 Hrs  T(C): 36.5 (27 Oct 2024 15:49), Max: 36.5 (27 Oct 2024 04:50)  T(F): 97.7 (27 Oct 2024 15:49), Max: 97.7 (27 Oct 2024 04:50)  HR: 602 (27 Oct 2024 15:49) (60 - 602)  BP: 146/68 (27 Oct 2024 15:49) (123/50 - 170/73)  BP(mean): 94 (27 Oct 2024 15:49) (73 - 100)  RR: 21 (27 Oct 2024 15:49) (17 - 21)  SpO2: 91% (27 Oct 2024 15:49) (88% - 92%)    Parameters below as of 27 Oct 2024 12:30  Patient On (Oxygen Delivery Method): nasal cannula  O2 Flow (L/min): 3      PHYSICAL EXAM:  GENERAL: NAD, well-groomed, well-developed  HEAD:  Atraumatic, Normocephalic  EYES: EOMI, PERRLA, conjunctiva and sclera clear  ENMT: No tonsillar erythema, exudates, or enlargement; Moist mucous membranes, Good dentition, No lesions  NECK: Supple, No JVD, Normal thyroid  NERVOUS SYSTEM:  Alert & awake, Good concentration; Motor Strength 5/5 B/L upper and lower extremities; DTRs 2+ intact and symmetric  CHEST/LUNG: Clear to auscultation bilaterally; No rales, rhonchi, wheezing, or rubs  HEART: Regular rate and rhythm; No murmurs, rubs, or gallops  ABDOMEN: Soft, Nontender, Nondistended; Bowel sounds present  EXTREMITIES:  2+ Peripheral Pulses, No clubbing, cyanosis, or edema  LYMPH: No lymphadenopathy noted  SKIN: No rashes or lesions    LABS:    27 Oct 2024 05:30    141    |  107    |  40     ----------------------------<  131    3.3     |  29     |  0.91     Ca    9.1        27 Oct 2024 05:30        Urinalysis Basic - ( 27 Oct 2024 05:30 )    Color: x / Appearance: x / SG: x / pH: x  Gluc: 131 mg/dL / Ketone: x  / Bili: x / Urobili: x   Blood: x / Protein: x / Nitrite: x   Leuk Esterase: x / RBC: x / WBC x   Sq Epi: x / Non Sq Epi: x / Bacteria: x      CAPILLARY BLOOD GLUCOSE          RADIOLOGY & ADDITIONAL TESTS:    Notes Reviewed:  [x ] YES  [ ] NO    Care Discussed with Consultants/Other Providers [x ] YES  [ ] NO
Date of Service 10-26-24 @ 14:53    Patient is a 78y old  Male who presents with a chief complaint of Asp PNA (26 Oct 2024 10:15)      INTERVAL /OVERNIGHT EVENTS: ? confused    MEDICATIONS  (STANDING):  atorvastatin 10 milliGRAM(s) Oral at bedtime  clonazePAM  Tablet 1.5 milliGRAM(s) Oral at bedtime  enoxaparin Injectable 40 milliGRAM(s) SubCutaneous every 24 hours  hyoscyamine SL 0.25 milliGRAM(s) SubLingual three times a day  isosorbide   dinitrate Tablet (ISORDIL) 5 milliGRAM(s) Oral two times a day  methylPREDNISolone sodium succinate Injectable 20 milliGRAM(s) IV Push every 24 hours  pantoprazole    Tablet 40 milliGRAM(s) Oral two times a day  piperacillin/tazobactam IVPB.. 3.375 Gram(s) IV Intermittent every 8 hours  sodium chloride 0.9%. 1000 milliLiter(s) (50 mL/Hr) IV Continuous <Continuous>  sucralfate 1 Gram(s) Oral four times a day    MEDICATIONS  (PRN):  ondansetron Injectable 4 milliGRAM(s) IV Push every 6 hours PRN Nausea and/or Vomiting      Allergies    No Known Allergies    Intolerances        REVIEW OF SYSTEMS:  CONSTITUTIONAL: No fever, weight loss, or fatigue  EYES: No eye pain, visual disturbances, or discharge  ENMT:  No difficulty hearing, tinnitus, vertigo; No sinus or throat pain  NECK: No pain or stiffness  RESPIRATORY: No cough, wheezing, chills or hemoptysis; No shortness of breath  CARDIOVASCULAR: No chest pain, palpitations, dizziness, or leg swelling  GASTROINTESTINAL: No abdominal or epigastric pain. No nausea, vomiting, or hematemesis; No diarrhea or constipation. No melena or hematochezia.  GENITOURINARY: No dysuria, frequency, hematuria, or incontinence  NEUROLOGICAL: No headaches, memory loss, loss of strength, numbness, or tremors  SKIN: No itching, burning, rashes, or lesions   LYMPH NODES: No enlarged glands  ENDOCRINE: No heat or cold intolerance; No hair loss; No polydipsia or polyuria  MUSCULOSKELETAL: No joint pain or swelling; No muscle, back, or extremity pain  PSYCHIATRIC: No depression, anxiety, mood swings, or difficulty sleeping  HEME/LYMPH: No easy bruising, or bleeding gums  ALLERGY AND IMMUNOLOGIC: No hives or eczema    Vital Signs Last 24 Hrs  T(C): 36.4 (26 Oct 2024 10:32), Max: 36.9 (25 Oct 2024 20:43)  T(F): 97.6 (26 Oct 2024 10:32), Max: 98.4 (25 Oct 2024 20:43)  HR: 62 (26 Oct 2024 04:16) (62 - 70)  BP: 163/84 (26 Oct 2024 04:16) (156/72 - 164/78)  BP(mean): 98 (26 Oct 2024 04:16) (97 - 102)  RR: 18 (26 Oct 2024 04:16) (18 - 23)  SpO2: 92% (26 Oct 2024 04:16) (92% - 94%)    Parameters below as of 26 Oct 2024 04:16  Patient On (Oxygen Delivery Method): nasal cannula  O2 Flow (L/min): 3      PHYSICAL EXAM:  GENERAL: NAD, well-groomed, well-developed  HEAD:  Atraumatic, Normocephalic  EYES: EOMI, PERRLA, conjunctiva and sclera clear  ENMT: No tonsillar erythema, exudates, or enlargement; Moist mucous membranes, Good dentition, No lesions  NECK: Supple, No JVD, Normal thyroid  NERVOUS SYSTEM:  Alert & awake, Good concentration; Motor Strength 5/5 B/L upper and lower extremities; DTRs 2+ intact and symmetric  CHEST/LUNG: Clear to auscultation bilaterally; No rales, rhonchi, wheezing, or rubs  HEART: Regular rate and rhythm; No murmurs, rubs, or gallops  ABDOMEN: Soft, Nontender, Nondistended; Bowel sounds present  EXTREMITIES:  2+ Peripheral Pulses, No clubbing, cyanosis, or edema  LYMPH: No lymphadenopathy noted  SKIN: No rashes or lesions    LABS:                        11.9   10.50 )-----------( 152      ( 26 Oct 2024 05:30 )             36.2     26 Oct 2024 05:30    139    |  104    |  37     ----------------------------<  110    3.3     |  28     |  0.91     Ca    9.2        26 Oct 2024 05:30  Mg     2.0       26 Oct 2024 05:30    TPro  5.7    /  Alb  2.6    /  TBili  1.8    /  DBili  x      /  AST  18     /  ALT  17     /  AlkPhos  57     26 Oct 2024 05:30      Urinalysis Basic - ( 26 Oct 2024 05:30 )    Color: x / Appearance: x / SG: x / pH: x  Gluc: 110 mg/dL / Ketone: x  / Bili: x / Urobili: x   Blood: x / Protein: x / Nitrite: x   Leuk Esterase: x / RBC: x / WBC x   Sq Epi: x / Non Sq Epi: x / Bacteria: x      CAPILLARY BLOOD GLUCOSE          RADIOLOGY & ADDITIONAL TESTS:    Notes Reviewed:  [x ] YES  [ ] NO    Care Discussed with Consultants/Other Providers [x ] YES  [ ] NO
Date of Service: 10-24-24 @ 11:14    Patient is a 78y old  Male who presents with a chief complaint of Asp PNA (24 Oct 2024 09:12)      INTERVAL HPI/OVERNIGHT EVENTS: Patient seen and examined. NAD. No complaints.    Vital Signs Last 24 Hrs  T(C): 36.8 (24 Oct 2024 07:39), Max: 37.7 (23 Oct 2024 21:11)  T(F): 98.2 (24 Oct 2024 07:39), Max: 99.8 (23 Oct 2024 21:11)  HR: 75 (24 Oct 2024 04:00) (71 - 79)  BP: 48/74 (24 Oct 2024 04:00) (48/74 - 161/75)  BP(mean): 93 (24 Oct 2024 04:00) (87 - 95)  RR: 30 (24 Oct 2024 04:00) (19 - 30)  SpO2: 97% (24 Oct 2024 04:00) (84% - 97%)    Parameters below as of 24 Oct 2024 04:00  Patient On (Oxygen Delivery Method): nasal cannula  O2 Flow (L/min): 4      10-24    143  |  106  |  39[H]  ----------------------------<  94  3.4[L]   |  28  |  1.03    Ca    9.4      24 Oct 2024 06:00  Mg     2.0     10-24    TPro  6.3  /  Alb  3.2[L]  /  TBili  2.6[H]  /  DBili  x   /  AST  22  /  ALT  17  /  AlkPhos  71  10-23                          12.0   5.41  )-----------( 126      ( 24 Oct 2024 06:00 )             36.3     PT/INR - ( 22 Oct 2024 22:09 )   PT: 15.0 sec;   INR: 1.30 ratio         PTT - ( 22 Oct 2024 22:09 )  PTT:29.3 sec  CAPILLARY BLOOD GLUCOSE        Urinalysis Basic - ( 24 Oct 2024 06:00 )    Color: x / Appearance: x / SG: x / pH: x  Gluc: 94 mg/dL / Ketone: x  / Bili: x / Urobili: x   Blood: x / Protein: x / Nitrite: x   Leuk Esterase: x / RBC: x / WBC x   Sq Epi: x / Non Sq Epi: x / Bacteria: x    < from: CT Chest No Cont (10.23.24 @ 10:11) >    ACC: [id number] EXAM:  CT CHEST   ORDERED BY: SANGEETHA LEOS     PROCEDURE DATE:  10/23/2024          INTERPRETATION:  INDICATION: Aspiration pneumonia    TECHNIQUE: Helical acquisition images of the chest without intravenous   contrast. Maximum intensity projection images were generated.    COMPARISON: None.    FINDINGS:    LUNGS/AIRWAYS/PLEURA: Patent airways to the segmental bronchi. Multilobar   consolidation, groundglass, centrilobular nodules, and other nodules in   all lobes, most severe in the lower lobes. Trace pleural effusions.    LYMPH NODES/MEDIASTINUM: No lymphadenopathy. Mildly dilated full length   of the esophagus filled with fluid to the level of the thoracic inlet.    HEART/VASCULATURE: Enlarged heart. No pericardial effusion. CABG, left   atrial appendage occlusion device, and dual-chamber cardiac device.    UPPER ABDOMEN: Partially included diffuse mesenteric fat stranding most   concentrated in the retroperitoneum. Trace perihepatic ascites.    BONES/SOFT TISSUES: Sternotomy. Few old rib fractures. L3 hemangioma.      IMPRESSION:    Multilobar pneumonia with a distribution suggestive of aspiration.    Diffusely dilated fluid-filled esophagus.    Partially included fat stranding in the abdomen, most concentrated in the   retroperitoneum. Consider pancreatitis; correlate with pancreatic enzymes.    --- End of Report ---            MARSHAL KLINE M.D., ATTENDING RADIOLOGIST  This document has been electronically signed. Oct 23 2024  4:30PM    < end of copied text >            enoxaparin Injectable 40 milliGRAM(s) SubCutaneous every 24 hours  hyoscyamine SL 0.25 milliGRAM(s) SubLingual three times a day  ondansetron Injectable 4 milliGRAM(s) IV Push every 6 hours PRN  pantoprazole  Injectable 40 milliGRAM(s) IV Push two times a day  piperacillin/tazobactam IVPB.. 3.375 Gram(s) IV Intermittent every 8 hours  sodium chloride 0.9%. 1000 milliLiter(s) IV Continuous <Continuous>              REVIEW OF SYSTEMS:  CONSTITUTIONAL: No fever, no weight loss, or no fatigue  NECK: No pain, no stiffness  RESPIRATORY: No cough, no wheezing, no chills, no hemoptysis, No shortness of breath  CARDIOVASCULAR: No chest pain, no palpitations, no dizziness, no leg swelling  GASTROINTESTINAL: No abdominal pain. No nausea, no vomiting, no hematemesis; No diarrhea, no constipation. No melena, no hematochezia.  GENITOURINARY: No dysuria, no frequency, no hematuria, no incontinence  NEUROLOGICAL: No headaches, no loss of strength, no numbness, no tremors  SKIN: No itching, no burning  MUSCULOSKELETAL: No joint pain, no swelling; No muscle, no back, no extremity pain  PSYCHIATRIC: No depression, no mood swings,   HEME/LYMPH: No easy bruising, no bleeding gums  ALLERY AND IMMUNOLOGIC: No hives       Consultant(s) Notes Reviewed:  [X] YES  [ ] NO    PHYSICAL EXAM:  GENERAL: NAD  HEAD:  Atraumatic, Normocephalic  EYES: EOMI, PERRLA, conjunctiva and sclera clear  ENMT: No tonsillar erythema, exudates, or enlargement; Moist mucous membranes  NECK: Supple, No JVD  NERVOUS SYSTEM:  Awake & alert  CHEST/LUNG: Clear to auscultation bilaterally; No rales, rhonchi, wheezing,  HEART: Regular rate and rhythm  ABDOMEN: Soft, Nontender, Nondistended; Bowel sounds present  EXTREMITIES:  No clubbing, cyanosis, or edema  LYMPH: No lymphadenopathy noted  SKIN: No rashes      Advanced care planning discussed with patient/family [X] YES   [ ] NO    Advanced care planning discussed with patient/family. Patient's health status was discussed. All appropriate changes have been made regarding patient's end-of-life care. Advanced care planning forms reviewed/discussed/completed.  20 minutes spent.

## 2024-10-29 NOTE — PROGRESS NOTE ADULT - PROBLEM SELECTOR PLAN 2
SLP noted  S/P MBS   Started on hyoscyamine, CCB  Continue iv PPI bid  now on diet  GI f/u SLP noted  S/P MBS   Started on hyoscyamine, imdur  Start CCB as outpatient  Continue iv PPI bid  now on diet  GI f/u

## 2024-10-29 NOTE — DISCHARGE NOTE PROVIDER - NSDCMRMEDTOKEN_GEN_ALL_CORE_FT
albuterol 90 mcg/inh inhalation aerosol: 2 puff(s) inhaled every 6 hours As needed Shortness of Breath and/or Wheezing  aspirin 81 mg oral tablet, chewable: 1 tab(s) chewed once a day  atorvastatin 10 mg oral tablet: 1 tab(s) orally once a day (at bedtime)  clonazePAM 0.5 mg oral tablet: 3 tab(s) orally once a day (at bedtime)  gabapentin 300 mg oral capsule: 1 cap(s) orally 3 times a day  hyoscyamine 0.125 mg sublingual tablet: 1 tab(s) sublingual 3 times a day (before meals)  isosorbide dinitrate 5 mg oral tablet: 1 tab(s) orally 2 times a day  lactobacillus acidophilus oral capsule: 1 cap(s) orally 2 times a day  pantoprazole 40 mg oral delayed release tablet: 1 tab(s) orally 2 times a day  predniSONE 10 mg oral tablet: 2 tab(s) orally once a day 2 tabs x 3 days and then 1 tab x 3 days then stop  QUEtiapine 25 mg oral tablet: 1 tab(s) orally once a day (at bedtime)  sucralfate 1 g oral tablet: 1 tab(s) orally 4 times a day   albuterol 90 mcg/inh inhalation aerosol: 2 puff(s) inhaled every 6 hours As needed Shortness of Breath and/or Wheezing  aspirin 81 mg oral tablet, chewable: 1 tab(s) chewed once a day  atorvastatin 10 mg oral tablet: 1 tab(s) orally once a day (at bedtime)  Augmentin 875 mg-125 mg oral tablet: 1 tab(s) orally 2 times a day x 2 days  clonazePAM 0.5 mg oral tablet: 3 tab(s) orally once a day (at bedtime)  gabapentin 300 mg oral capsule: 1 cap(s) orally 3 times a day  hyoscyamine 0.125 mg sublingual tablet: 1 tab(s) sublingual 3 times a day (before meals)  isosorbide dinitrate 5 mg oral tablet: 1 tab(s) orally 2 times a day  lactobacillus acidophilus oral capsule: 1 cap(s) orally 2 times a day  pantoprazole 40 mg oral delayed release tablet: 1 tab(s) orally 2 times a day  predniSONE 10 mg oral tablet: 2 tab(s) orally once a day 2 tabs x 3 days and then 1 tab x 3 days then stop  QUEtiapine 25 mg oral tablet: 1 tab(s) orally once a day (at bedtime)  sucralfate 1 g oral tablet: 1 tab(s) orally 4 times a day

## 2024-10-29 NOTE — DISCHARGE NOTE NURSING/CASE MANAGEMENT/SOCIAL WORK - PATIENT PORTAL LINK FT
You can access the FollowMyHealth Patient Portal offered by [facility] by registering at the following website: [email or web address]. By joining [employer]’s FollowMyHealth portal, you will also be able to view your health information using other applications (apps) compatible with our system.

## 2024-10-29 NOTE — DISCHARGE NOTE PROVIDER - PROVIDER TOKENS
PROVIDER:[TOKEN:[[id number]],FOLLOWUP:[1 month]],PROVIDER:[TOKEN:[[id number]:[id number]],FOLLOWUP:[1 month]]
Never smoker

## 2024-10-29 NOTE — PROGRESS NOTE ADULT - ASSESSMENT
Esophageal Dysphagia  Esophageal Dysmotility     Recommendations:  - Suspect pt has underlying esophageal dysmotility possible diffuse esophageal spasm. He was reportedly placed on a medication to relax his esophagus ?CCB but caused a rash  - [place] per slp  - c/w hyoscyamine  - ccb BID  - PPI 40mg po BID  - Carafate QID  - f/u GI in [place] on discharge, may need esophageal manometry  - Will follow with you      Edgar Gastro  [phone]

## 2024-10-29 NOTE — PROGRESS NOTE ADULT - PROBLEM SELECTOR PLAN 3
2/2 to aspiration PNA  Supplemental oxygen prn  Pulmonary consult noted  Further work-up/management pending clinical course.  ? home o2
2/2 to aspiration PNA  Supplemental oxygen prn  Pulmonary consult noted  Further work-up/management pending clinical course.
2/2 to aspiration PNA  Supplemental oxygen prn  Pulmonary consult noted  Further work-up/management pending clinical course.
2/2 to aspiration PNA  Supplemental oxygen prn  Pulmonary consult noted  Further work-up/management pending clinical course.  ? home o2
2/2 to aspiration PNA  Supplemental oxygen prn  Pulmonary consult noted  Further work-up/management pending clinical course.
2/2 to aspiration PNA  Supplemental oxygen prn  Pulmonary consult noted  Further work-up/management pending clinical course.  ? home o2

## 2024-10-29 NOTE — DISCHARGE NOTE PROVIDER - HOSPITAL COURSE
This is a 78 M with PMH of HLD GI motility issues who came in c/o that he keeps regurgitating everything he eats or drinks. Patient is follows with a GI doctor. He has a h/o problems swallowing and was placed on a  dicyclomine to help with his dysphagia. Patient says he developed a rash. Patient saw a dermatologist and was prescribed prednisone. The dermatologist told him to stop the dicyclomine. He says his GI unaware that pt stopped taking his dicyclomine. He denies SOB. In the ER he was found to have T 100.4 and SaO2 89% on RA.     Problem/Plan - 1:  ·  Problem: Aspiration pneumonia.   ·  Plan: Continue Zosyn, ok to change to po Augmentin until 10/30  SLP apolinaral noted -- tolerating diet  Monitor oxygen level and supplemental oxygen prn  Pulm/ID follow up  S/P MBS.     Problem/Plan - 2:  ·  Problem: Dysphagia.   ·  Plan: SLP noted  S/P MBS   Started on hyoscyamine, CCB  Continue iv PPI bid  now on diet  GI f/u.     Problem/Plan - 3:  ·  Problem: Acute respiratory failure with hypoxia.   ·  Plan: 2/2 to aspiration PNA  Supplemental oxygen prn  Pulmonary consult noted  Further work-up/management pending clinical course.  ? home o2.     Problem/Plan - 4:  ·  Problem: AMS (altered mental status).   ·  Plan: ? etiology -- resolved  neuro and psych eval noted  haldol prn  seroquel.    Going to [facility]    >35 minutes spent on discharge

## 2024-10-29 NOTE — DISCHARGE NOTE PROVIDER - NSDCCPCAREPLAN_GEN_ALL_CORE_FT
PRINCIPAL DISCHARGE DIAGNOSIS  Diagnosis: Pneumonia, aspiration  Assessment and Plan of Treatment: Finish course of antibiotics.  Follow-up with your primary care doctor within 1 week.        SECONDARY DISCHARGE DIAGNOSES  Diagnosis: Dysphagia  Assessment and Plan of Treatment: Continue current medications  Follow-up with your primary care doctor within 1 week.

## 2024-10-29 NOTE — PROGRESS NOTE ADULT - PROBLEM SELECTOR PLAN 1
Continue Zosyn, ok to change to po Augmentin until 10/30  SLP gregorio noted -- tolerating diet  Monitor oxygen level and supplemental oxygen prn  Pulm/ID follow up  S/P MBS
Continue Zosyn  SLP eval noted  Monitor oxygen level and supplemental oxygen prn  Pulm/ID f/u  Further work-up/management pending clinical course.
Continue Zosyn  SLP eval noted  Monitor oxygen level and supplemental oxygen prn  Pulm/ID follow up  S/P MBS
Continue Zosyn, ok to change to po Augmentin until 10/30  SLP gregorio noted -- tolerating diet  Monitor oxygen level and supplemental oxygen prn  Pulm/ID follow up  S/P MBS

## 2024-10-29 NOTE — PROGRESS NOTE ADULT - PROBLEM SELECTOR PLAN 4
? etiology  sundowning  neuro and psych eval noted  haldol prn  add seroquel
? etiology  sundowning  neuro and psych eval  haldol prn  add seroquel
? etiology -- resolved  neuro and psych eval noted  haldol prn  seroquel

## 2024-10-29 NOTE — PROGRESS NOTE ADULT - PROVIDER SPECIALTY LIST ADULT
Cardiology
Infectious Disease
Neurology
Cardiology
Infectious Disease
Internal Medicine
Internal Medicine
Pulmonology
Cardiology
Gastroenterology
Gastroenterology
Infectious Disease
Pulmonology
Family Medicine
Gastroenterology
Neurology
Neurology
Pulmonology
Family Medicine
Internal Medicine
Internal Medicine

## 2024-10-29 NOTE — PROGRESS NOTE ADULT - REASON FOR ADMISSION
Asp PNA

## 2024-11-06 ENCOUNTER — TRANSCRIPTION ENCOUNTER (OUTPATIENT)
Age: 79
End: 2024-11-06

## 2024-11-08 RX ORDER — CLONAZEPAM 1 MG
0 TABLET ORAL
Refills: 0 | DISCHARGE

## 2024-11-08 RX ORDER — GABAPENTIN 300 MG/1
1 CAPSULE ORAL
Refills: 0 | DISCHARGE

## 2024-11-08 RX ORDER — FAMOTIDINE 10 MG/ML
0 INJECTION INTRAVENOUS
Refills: 0 | DISCHARGE

## 2024-11-08 RX ORDER — CLONAZEPAM 1 MG
3 TABLET ORAL
Refills: 0 | DISCHARGE

## 2024-11-08 RX ORDER — MELATONIN 5 MG
0 TABLET ORAL
Refills: 0 | DISCHARGE

## 2024-11-08 RX ORDER — AMOXICILLIN AND CLAVULANATE POTASSIUM 600; 42.9 MG/5ML; MG/5ML
1 POWDER, FOR SUSPENSION ORAL
Qty: 0 | Refills: 0 | DISCHARGE

## 2024-11-08 RX ORDER — PANTOPRAZOLE SODIUM 40 MG/1
1 TABLET, DELAYED RELEASE ORAL
Refills: 0 | DISCHARGE

## 2024-11-08 RX ORDER — FAMOTIDINE 10 MG/ML
1 INJECTION INTRAVENOUS
Refills: 0 | DISCHARGE

## 2024-11-08 RX ORDER — DICYCLOMINE HYDROCHLORIDE 20 MG/1
2 TABLET ORAL
Refills: 0 | DISCHARGE

## 2024-11-08 RX ORDER — ASPIRIN/MAG CARB/ALUMINUM AMIN 325 MG
1 TABLET ORAL
Refills: 0 | DISCHARGE

## 2024-11-13 ENCOUNTER — TRANSCRIPTION ENCOUNTER (OUTPATIENT)
Age: 79
End: 2024-11-13

## 2024-11-26 ENCOUNTER — TRANSCRIPTION ENCOUNTER (OUTPATIENT)
Age: 79
End: 2024-11-26

## 2024-12-19 NOTE — SWALLOW VFSS/MBS ASSESSMENT ADULT - RESIDUE ALONG THE BASE OF THE TONGUE
[facility] MEDICINE PROGRESS NOTE   Patient: Pastor Choi  Today's Date: 12/19/2024    YOB: 1945  Admission Date: 12/16/2024    MRN: [id number]  Inpatient LOS: 2    Room: [facility]/  Hospital Day: Hospital Day: 4    Subjective   History and Subjective complaints     Follow-up for: Increased somnolence    Interval History / Subjective:   The patient is unable to provide any report due to advanced dementia.  He woke up to verbal stimuli and was able to inconsistently answer yes/no questions.  Was unable to follow commands.  Then patient suddenly opened his eyes for several minutes looking engaged, said yes to question about feeling hungry, and then suddenly closed his eyes and refused to talk anymore.  CT scan of the head obtained yesterday showed no acute findings.      Hospital Course:  Pastor Choi is a 79 year old male who presented on 12/16/2024 with complaints of fatigue      ROS:  Pertinent systems negative except as above.    Objective     Scheduled Medications:    folic acid, 1 mg, Daily  latanoprost, 1 drop, Nightly  levothyroxine, 25 mcg, QAM  [Held by provider] rivastigmine, 1 patch, Daily  enoxaparin, 40 mg, Daily  Potassium Standard Replacement Protocol (Levels 3.5 and lower), , See Admin Instructions  Potassium Replacement (Levels 3.6 - 4), , See Admin Instructions  Magnesium Standard Replacement Protocol, , See Admin Instructions  Phosphorus Standard Replacement Protocol, , See Admin Instructions      Continuous Infusions:    Current Facility-Administered Medications   Medication Dose Route Frequency Provider Last Rate Last Admin     PRN Medications:    Current Facility-Administered Medications   Medication Dose Route Frequency Provider Last Rate Last Admin    acetaminophen (TYLENOL) tablet 650 mg  650 mg Oral Q4H PRN Antwan Mcnulty MD        polyethylene glycol (MIRALAX) packet 17 g  17 g Oral Daily PRN Antwan Mcnulty MD        docusate  Mild sodium-sennosides (SENOKOT S) 50-8.6 MG 2 tablet  2 tablet Oral Daily PRN Antwan Mcnulty MD        bisacodyl (DULCOLAX) suppository 10 mg  10 mg Rectal Daily PRN Antwan Mcnulty MD        magnesium hydroxide (MILK OF MAGNESIA) 400 MG/5ML suspension 30 mL  30 mL Oral Daily PRN Antwna Mcnulty MD        aluminum-magnesium hydroxide-simethicone (MAALOX) 200-200-20 MG/5ML suspension 30 mL  30 mL Oral Q4H PRN Antwna Mcnulty MD         Physical Examination     General:  In NAD.  Appears to be sleeping or laying quietly was eyes closed.  Unable to follow commands.  Only able to answer random yes/no questions.  Attempts to speak but speech is nonsensical.  Vital Signs:  Visit Vitals  BP (!) 150/65 (BP Location: RFA - Right forearm, Patient Position: Semi-Duarte's)   Pulse 69   Temp 97.6 °F (36.4 °C) (Oral)   Resp 16   Ht 5' 11\" (1.803 m)   Wt 76.4 kg (168 lb 6.9 oz)   SpO2 95%   BMI 23.49 kg/m²     Skin:  Moist and warm, no rashes.  Neck:  Supple, symmetric.  No JVD, No carotid bruit.  Normal carotid upstroke and amplitude.   Respiratory:  CTA B/L.  Good effort.  Cardiovascular:  S1, S2.  RRR.  2+ pedal pulses.  Abdomen:  Soft, nontender, NRT, No HSM.  Extremities:  No edema or calf tenderness B/L.  No acrocyanosis.   Neurologic:  CN II-XII are grossly intact.  Full strength in upper and lower extremities B/L.    Intake and Output:    Intake/Output Summary (Last 24 hours) at 12/19/2024 1213  Last data filed at 12/19/2024 0728  Gross per 24 hour   Intake 200 ml   Output 800 ml   Net -600 ml       Last Stool Occurrence:       Tubes, Devices, Monitoring     Telemetry: On  Consults:    None  Diet:  One Time Diet Regular  Consistent Carb Moderate (45-75 Gm/Meal) Diet  Therapy Orders:    PT and OT Orders Placed this Encounter   Procedures    Occupational Therapy Evaluation    Occupational Therapy Treatment    Physical Therapy Evaluation    Physical Therapy Treatment     Limited English proficiency with :   No      Test Results     Labs: The Laboratory values listed below have been reviewed and pertinent findings discussed in the Assessment and Plan.    Radiology: Imaging studies have been reviewed and pertinent findings discussed in the Assessment and Plan.  Results for orders placed or performed during the hospital encounter of 12/16/24 (from the past 48 hour(s))   CT HEAD WO CONTRAST    Impression    Impression:  1. No acute intracranial findings.  2. Moderate to severe generalized cerebral volume loss with associated  ventricular enlargement.    Electronically Signed by: Saran Musa MD  Signed on: 12/18/2024 7:26 PM  Created on Workstation ID: [id number]  Signed on Workstation ID: [id number]       Glucose (mg/dL)   Date Value   12/17/2024 122 (H)   12/16/2024 124 (H)   08/16/2024 107 (H)       WBC (K/mcL)   Date Value   12/17/2024 3.6 (L)   12/16/2024 4.9   08/16/2024 3.1 (L)     HGB (g/dL)   Date Value   12/17/2024 14.2   12/16/2024 14.9   08/16/2024 13.0    Sodium (mmol/L)   Date Value   12/17/2024 144   12/16/2024 144   08/16/2024 142    BUN (mg/dL)   Date Value   12/17/2024 16   12/16/2024 21 (H)   08/16/2024 24 (H)      PLT (K/mcL)   Date Value   12/17/2024 158   12/16/2024 186   08/16/2024 157     Potassium (mmol/L)   Date Value   12/17/2024 4.0   12/16/2024 4.0   08/16/2024 3.9    Creatinine (mg/dL)   Date Value   12/17/2024 1.20 (H)   12/16/2024 1.13   08/16/2024 1.05        TROPONIN I (ng/mL)   Date Value   10/11/2015 <0.02     Troponin I, Ultra Sensitive (ng/mL)   Date Value   05/17/2021 <0.02      CK (Units/L)   Date Value   08/14/2024 45      No results found for: \"MMB\"  INR (no units)   Date Value   08/14/2024 1.0     Last Lab A1C:  Hemoglobin A1C (%)   Date Value   09/18/2022 5.7 (H)       Last Point of Care A1C:  No results found for: \"JELVIVOA7H\", \"5GLYH\"  No results found for: \"IULOBRYA0X\"     Advanced Directives     Code Status: Do Not Resuscitate     Assessment and Plan     Assessment:  1.   History of somnolence, could be related to previously undiagnosed Lewy body dementia with disturbed sleep/wake cycle.  Known history of advanced Alzheimer's dementia.  Son reports from memory care unit reports that patient's current condition appears to be his baseline.  Patient's spouse, is in disagreement, and filed appeal to discharge.  Extensive workup was not able to identify any reversible causes of progressive sleepiness and cognitive decline.  CT scan obtained on admission did not find any acute changes, and we repeated CT scan on 12/18/2024 with no acute findings again.  We suspect patient's changes noted by wife is a natural progression of dementia.    2.  Parkinson's disease.  We see no parkinsonian disease manifestations at this time.    3.  Pulmonary atelectasis, likely related to chronic immobilization.    Plan:  Discharge patient back to memory care facility as soon as it could be done  DNR  Continue supportive measures in the interim      Discharge Planing      Recommendations for Discharge   SW Assisted living, Home therapy   PT     OT     SLP        Anticipated discharge destination: Assisted living, Home therapy   Expected Discharge Date: To be determined  Barriers to Discharge: Pending appeal to discharge decision    Antwan Mcnulty MD  12/19/2024  12:13 PM

## 2025-06-10 NOTE — ED ADULT TRIAGE NOTE - ESI TRIAGE ACUITY LEVEL, MLM
Please call the office when you leave to schedule an appointment for 2 weeks.              Please call [phone]. [address], [address], [place], [address]. [address] [place] and [address].     Activity:    As tolerated    Return to Work:   Okay to return to work when you feel well if you desire.        Diet:   You may return to your normal healthy diet.    Wound Care:  Your wound is closed with dissolvable stitches and glue.  It is okay to shower. Wash incision gently with soap and water and pat dry. Do not soak incisions in bath water or swim for two weeks. Do not apply any creams or ointments.    Pain Medication:   Please take as directed if needed. May use Advil or Motrin in addition.  Recall, the pain medicine and anesthesia is associated with constipation.    No driving while taking narcotic pain medications.      Other:  It is normal to developed a “healing ridge” / firm incision after surgery.  This is your body making scar tissue.  It is a good sign  Constipation is very common after general anesthesia.  Please use milk of magnesia as needed in order to help prevent constipation.  It is normal to get bruising after surgery.  If you have questions after discharge please call the office.    If you have increased pain, fever >101.5, increased drainage, redness or a bad smell at your surgery site, please call us immediately or come directly to the Emergency Room     3

## (undated) DEVICE — FORMALIN CUPS 10% BUFFERED

## (undated) DEVICE — ELCTR GROUNDING PAD ADULT COVIDIEN

## (undated) DEVICE — BRUSH CYTO ENDO

## (undated) DEVICE — BITE BLOCK MAXI RUBBER STAMP

## (undated) DEVICE — RADIAL JAW 4 LG CAPACITY WITH NDL

## (undated) DEVICE — BRUSH COLONOSCOPY CYTOLOGY

## (undated) DEVICE — CATH ELECHMSTAT  INJ 7FR 210CM

## (undated) DEVICE — SOL IRR NS 0.9% 250ML

## (undated) DEVICE — SNARE POLYP SENS 27MM 240CM

## (undated) DEVICE — SOL IRR POUR H2O 500ML

## (undated) DEVICE — SOL IRR POUR H2O 1000ML

## (undated) DEVICE — TUBING IV SET GRAVITY 3Y 100" MACRO

## (undated) DEVICE — SYR ALLIANCE II INFLATION 60ML

## (undated) DEVICE — SUCTION YANKAUER TAPERED BULBOUS NO VENT

## (undated) DEVICE — CATH IV SAFE BC 20G X 1.16" (PINK)

## (undated) DEVICE — GLV 8 PROTEXIS (WHITE)

## (undated) DEVICE — SYR IV POSIFLUSH NS 3ML 30/TY

## (undated) DEVICE — DRSG CURITY GAUZE SPONGE 4 X 4" 12-PLY

## (undated) DEVICE — PACK IV START WITH CHG

## (undated) DEVICE — TUBING SUCTION CONN 6FT STERILE

## (undated) DEVICE — NDL INJ SCLERO INTERJECT 23G

## (undated) DEVICE — SENSOR O2 FINGER XL ADULT 24/BX 6BX/CA

## (undated) DEVICE — SET IV PUMP BLOOD 1VALVE 180FILTER NON-DEHP

## (undated) DEVICE — FORCEP RADIAL JAW 4 W NDL 2.2MM 2.8MM 160CM YELLOW DISP

## (undated) DEVICE — MARKER ENDO SPOT EX

## (undated) DEVICE — SYR LUER SLIP TIP 30CC

## (undated) DEVICE — SOL IRR BAG H2O 1000ML

## (undated) DEVICE — CATH IV SAFE BC 22G X 1" (BLUE)

## (undated) DEVICE — TUBING IV SET SECOND 34" W/O LOK-BLUNT

## (undated) DEVICE — Device

## (undated) DEVICE — TUBE O2 SUPL CRUSH RESIS CONN SOUTHSIDE ONLY

## (undated) DEVICE — VALVE BIOPSY

## (undated) DEVICE — CANISTER SUCTION 1200CC 10/SL

## (undated) DEVICE — TUBING CANNULA SALTER LABS NASAL ADULT 7FT

## (undated) DEVICE — SNARE LRG

## (undated) DEVICE — CATH ELCTR GLIDE PRB 7FR

## (undated) DEVICE — SYR LUER LOK 30CC

## (undated) DEVICE — STERIS DEFENDO 3-PIECE KIT (AIR/WATER, SUCTION & BIOPSY VALVES)

## (undated) DEVICE — SOL IRR POUR NS 0.9% 1000ML

## (undated) DEVICE — MASK O2 NON REBREATH 3IN1 ADULT

## (undated) DEVICE — TRAP QUICK CATCH  SINGL CHAMBER

## (undated) DEVICE — MASK LRG MED AND HIGH O2 CONC M TO M 10FT